# Patient Record
Sex: MALE | Race: WHITE | NOT HISPANIC OR LATINO | Employment: OTHER | URBAN - METROPOLITAN AREA
[De-identification: names, ages, dates, MRNs, and addresses within clinical notes are randomized per-mention and may not be internally consistent; named-entity substitution may affect disease eponyms.]

---

## 2020-12-15 ENCOUNTER — TRANSFERRED RECORDS (OUTPATIENT)
Dept: HEALTH INFORMATION MANAGEMENT | Facility: CLINIC | Age: 70
End: 2020-12-15

## 2021-01-08 ENCOUNTER — TRANSFERRED RECORDS (OUTPATIENT)
Dept: HEALTH INFORMATION MANAGEMENT | Facility: CLINIC | Age: 71
End: 2021-01-08

## 2021-05-26 ENCOUNTER — RECORDS - HEALTHEAST (OUTPATIENT)
Dept: ADMINISTRATIVE | Facility: CLINIC | Age: 71
End: 2021-05-26

## 2021-06-23 ENCOUNTER — TRANSFERRED RECORDS (OUTPATIENT)
Dept: HEALTH INFORMATION MANAGEMENT | Facility: CLINIC | Age: 71
End: 2021-06-23

## 2021-06-25 ENCOUNTER — TRANSCRIBE ORDERS (OUTPATIENT)
Dept: OTHER | Age: 71
End: 2021-06-25

## 2021-06-25 DIAGNOSIS — R21 RASH: Primary | ICD-10-CM

## 2021-07-19 ENCOUNTER — VIRTUAL VISIT (OUTPATIENT)
Dept: DERMATOLOGY | Facility: CLINIC | Age: 71
End: 2021-07-19
Attending: DERMATOLOGY
Payer: MEDICARE

## 2021-07-19 DIAGNOSIS — D69.2 PURPURA (H): ICD-10-CM

## 2021-07-19 DIAGNOSIS — L20.84 INTRINSIC ATOPIC DERMATITIS: Primary | ICD-10-CM

## 2021-07-19 PROCEDURE — 99443 PR PHYSICIAN TELEPHONE EVALUATION 21-30 MIN: CPT | Performed by: DERMATOLOGY

## 2021-07-19 RX ORDER — DESONIDE 0.5 MG/G
OINTMENT TOPICAL
COMMUNITY
Start: 2021-01-25 | End: 2022-05-22

## 2021-07-19 RX ORDER — LISINOPRIL 20 MG/1
30 TABLET ORAL
COMMUNITY
Start: 2021-05-25 | End: 2024-01-01

## 2021-07-19 RX ORDER — BETAMETHASONE DIPROPIONATE 0.5 MG/ML
LOTION, AUGMENTED TOPICAL
COMMUNITY
Start: 2020-11-02 | End: 2022-05-22

## 2021-07-19 NOTE — LETTER
7/19/2021       RE: Junaid Cormier  [address] [place] [address]     Dear Colleague,    Thank you for referring your patient, Junaid Cormier, to the [facility] DERMATOLOGY CLINIC [place] at [facility]. Please see a copy of my visit note below.    [facility] Dermatology Note  Encounter Date: Jul 19, 2021   Store-and-Forward and Telephone ([phone] ). Location of teledermatologist: [facility] DERMATOLOGY [facility]INIC [place].  Start time: 12:42. End time: 1:21.    Dermatology Problem List:  1. Eczematous dermatitis: may have component of ACD; MF has been a diagnostic consideration in past but biopsies/clinical not supportive  - outside biopsy x2: subacute spongiotic dermatitis  - current therapy: starting dupilumab; on mycophenolate 1000 mg BID, cetirizine 10/20 mg, esoximetasone/desonide/fluocinonide, nbUVB phototherapy  - in reserve: methotrexate  2. Actinic purpura: due in part to topical steroid use   ____________________________________________    Assessment & Plan:     1. Eczematous dermatitis: will need to obtain outside records for further assessment, however patient's wife read out biopsy reports to be over the phone which clearly support a diagnosis of eczematous dermatitis and do not support a diagnosis of mycosis fungoides. Photos are also consistent with the former but not the latter diagnosis. Given refractoriness and persistent pruritus, patient is a good candidate for dupilumab and we discussed risks/benefits. Query component of allergic contact dermatitis though no clear inciting factor; once off mycophenolate would consider referral for patch testing. Has tried/failed numerous topicals (and now with purpura due in part to steroid-induced atrophy), phototherapy, oral antihistamines, mycophenolate.  - start dupilumab  - once started dupilumab, will taper mycophenolate first, then nbUVB phototherapy  and topicals; for now will continue the current regimen: mycophenolate mofetil 1000 mg BID, cetirizine 10 mg qam/20 mg at bedtime, desoximetasone/desonide/fluocinonide, nbUVB phototherapy  - consider patch testing in future once off mycophenolate      Procedures Performed:    None    Over 48 minutes was spent during this visit on the date of service, including >39 minutes of direct contact time with the patient counseling and coordinating care including the discussion and documentation of diagnosis, natural history, treatment, and prognosis. This excludes time spent performing any relevant procedures.    Follow-up: 2-3 months    Staff:     Gilberto Virgen MD, FAAD   of Dermatology  Department of Dermatology  [facility]    ____________________________________________    CC: Rash (Junaid, is here for rash or eczema, no other concerns )    HPI:  Mr. Junaid Cormier is a(n) 71 year old male who presents today as a new patient for rash    Rash - present for more than 1 year  - itchy, widespread  - no redness anymore  - scratches very hard and leads to bruising  - now mostly present on arms, hands, scalp - cracking on hands, feet - made ambulation difficult  - was initially red and raised  - had lots of ointments, oral medications (cetirizine 10 mg qam, 20 mg at bedtime), phototherapy  - on mycophenolate mofetil 1000 mg BID x4-5 months  - currently on desoximetasone, desonide, fluocinonide solution and Vanicream lotion  - had two biopsies which showed eczema:   - chest & shoulder - 12/15/2020 - subacute spongiotic dermatitis; PAS negative    Patient is otherwise feeling well, without additional skin concerns.    Labs Reviewed:  N/A    Physical Exam:  Vitals: There were no vitals taken for this visit.  SKIN: Teledermatology photos were reviewed; image quality and interpretability: acceptable. Image date: 7/16/21.  - purpuric patches on the neck, forearms  -  erythema and flaking on the scalp  - fissuring on the fingertips  - No other lesions of concern on areas examined.     Medications:  Current Outpatient Medications   Medication     augmented betamethasone dipropionate (DIPROLENE) 0.05 % external lotion     cetirizine HCl 10 MG CAPS     desonide (DESOWEN) 0.05 % external ointment     Dupilumab 300 MG/2ML SOPN     Dupilumab 300 MG/2ML SOPN     lisinopril (ZESTRIL) 20 MG tablet     No current facility-administered medications for this visit.      Past Medical/Surgical History:   Patient Active Problem List   Diagnosis     Intrinsic atopic dermatitis     History reviewed. No pertinent past medical history.    CC Dominic Luke MD  [facility]  [address] [facility] [address]  [place] [address] on close of this encounter.

## 2021-07-19 NOTE — NURSING NOTE
Chief Complaint   Patient presents with     Rash     Junaid, is here for rash or eczema, no other concerns     David Barney EMT

## 2021-07-19 NOTE — PROGRESS NOTES
[facility] Dermatology Note  Encounter Date: Jul 19, 2021   Store-and-Forward and Telephone ([phone] ). Location of teledermatologist: [facility] DERMATOLOGY CLINIC [place].  Start time: 12:42. End time: 1:21.    Dermatology Problem List:  1. Eczematous dermatitis: may have component of ACD; MF has been a diagnostic consideration in past but biopsies/clinical not supportive  - outside biopsy x2: subacute spongiotic dermatitis  - current therapy: starting dupilumab; on mycophenolate 1000 mg BID, cetirizine 10/20 mg, esoximetasone/desonide/fluocinonide, nbUVB phototherapy  - in reserve: methotrexate  2. Actinic purpura: due in part to topical steroid use   ____________________________________________    Assessment & Plan:     1. Eczematous dermatitis: will need to obtain outside records for further assessment, however patient's wife read out biopsy reports to be over the phone which clearly support a diagnosis of eczematous dermatitis and do not support a diagnosis of mycosis fungoides. Photos are also consistent with the former but not the latter diagnosis. Given refractoriness and persistent pruritus, patient is a good candidate for dupilumab and we discussed risks/benefits. Query component of allergic contact dermatitis though no clear inciting factor; once off mycophenolate would consider referral for patch testing. Has tried/failed numerous topicals (and now with purpura due in part to steroid-induced atrophy), phototherapy, oral antihistamines, mycophenolate.  - start dupilumab  - once started dupilumab, will taper mycophenolate first, then nbUVB phototherapy and topicals; for now will continue the current regimen: mycophenolate mofetil 1000 mg BID, cetirizine 10 mg qam/20 mg at bedtime, desoximetasone/desonide/fluocinonide, nbUVB phototherapy  - consider patch testing in future once off mycophenolate      Procedures Performed:    None    Over 48 minutes was spent during this  visit on the date of service, including >39 minutes of direct contact time with the patient counseling and coordinating care including the discussion and documentation of diagnosis, natural history, treatment, and prognosis. This excludes time spent performing any relevant procedures.    Follow-up: 2-3 months    Staff:     Gilberto Virgen MD, FAAD   of Dermatology  Department of Dermatology  [facility]    ____________________________________________    CC: Rash (Junaid, is here for rash or eczema, no other concerns )    HPI:  Mr. Junaid Cormier is a(n) 71 year old male who presents today as a new patient for rash    Rash - present for more than 1 year  - itchy, widespread  - no redness anymore  - scratches very hard and leads to bruising  - now mostly present on arms, hands, scalp - cracking on hands, feet - made ambulation difficult  - was initially red and raised  - had lots of ointments, oral medications (cetirizine 10 mg qam, 20 mg at bedtime), phototherapy  - on mycophenolate mofetil 1000 mg BID x4-5 months  - currently on desoximetasone, desonide, fluocinonide solution and Vanicream lotion  - had two biopsies which showed eczema:   - chest & shoulder - 12/15/2020 - subacute spongiotic dermatitis; PAS negative    Patient is otherwise feeling well, without additional skin concerns.    Labs Reviewed:  N/A    Physical Exam:  Vitals: There were no vitals taken for this visit.  SKIN: Teledermatology photos were reviewed; image quality and interpretability: acceptable. Image date: 7/16/21.  - purpuric patches on the neck, forearms  - erythema and flaking on the scalp  - fissuring on the fingertips  - No other lesions of concern on areas examined.     Medications:  Current Outpatient Medications   Medication     augmented betamethasone dipropionate (DIPROLENE) 0.05 % external lotion     cetirizine HCl 10 MG CAPS     desonide (DESOWEN) 0.05 % external ointment      Dupilumab 300 MG/2ML SOPN     Dupilumab 300 MG/2ML SOPN     lisinopril (ZESTRIL) 20 MG tablet     No current facility-administered medications for this visit.      Past Medical/Surgical History:   Patient Active Problem List   Diagnosis     Intrinsic atopic dermatitis     History reviewed. No pertinent past medical history.    CC Dominic Luke MD  [facility]  [address]  [place] [address] on close of this encounter.

## 2021-07-21 ENCOUNTER — TELEPHONE (OUTPATIENT)
Dept: DERMATOLOGY | Facility: CLINIC | Age: 71
End: 2021-07-21

## 2021-07-21 DIAGNOSIS — L20.84 INTRINSIC ATOPIC DERMATITIS: Primary | ICD-10-CM

## 2021-07-21 NOTE — TELEPHONE ENCOUNTER
PA Initiation    Medication: Dupixent  Insurance Company: [employer] Part D - Phone [phone] Fax [phone]  ID#: [id number]  Pharmacy Filling the Rx: [facility] - [place], [place] - [facility] [address]  Filling Pharmacy Phone:    Filling Pharmacy Fax:    Start Date: 7/21/2021    [facility] [id number]

## 2021-07-21 NOTE — LETTER
[facility]/ Clinical Review Dept.  Patient: Junaid Cormier  ID#:  [id number]  From the office of Gilberto Virgen MD      To whom it may concern,    I am writing this letter of medical necessity in regards to the recent denial of dupilumab (Dupixent) 300mg/ 2mL pens. Junaid Cormier is a pleasant male who unfortunately suffers from a very difficult, complex, long-term history of intrinsic atopic dermatitis (AD). Given refractoriness and persistent pruritus, patient is a good candidate for dupilumab and we discussed risks/benefits.Has tried/failed numerous topicals (and now with purpura due in part to steroid-induced atrophy), phototherapy, oral antihistamines, mycophenolate.    Atopic dermatitis, a form of eczema, is a chronic inflammatory disease with symptoms often appearing as a rash on the skin. Moderate-to-severe atopic dermatitis is characterized by rashes often covering much of the body, and can include intense, persistent itching and skin dryness, cracking, redness, crusting, and oozing. Itch is one of the most burdensome symptoms for patients and can be debilitating. In addition, people with moderate-to-severe atopic dermatitis experience impaired quality of life, including disrupted sleep, and increased anxiety and depression symptoms along with their disease.    In your denial letter, you state patient must first try and fail one of the covered drug:   (a) Clobetasol solution or clobetasole E  (b) Fluticasone ointment  (c) Halobetasol cream  (d) Halog ointment  (e)  Mometasone ointment  (f) Pimecrolimus cream   (g) Tacrolimus ointment.    Mr. Cormier has tried and failed mometasone (July 23, 2021-current), making him eligible to start dupilumab.  Please allow Mr. Cormier the opportunity to begin dupilumab treatment to control his atopic dermatitis.        Per package insert,  DUPIXENT is an interleukin-4 receptor alpha antagonist indicated for the treatment of patients with  moderate-to-severe atopic dermatitis whose disease is not adequately controlled with topical prescription therapies.  The patient therefore meets criteria for coverage.    * Please see attached relevant information    We feel that dupilumab (Dupixent) 300mg/ 2mL pens are the best choice of therapy for Mr. Cormier. We would like to pursue this course of therapy and are requesting that you approve our decision to provide Dupixent to our patient, allowing us to provide the best treatment option available. We thank you for your immediate attention to this issue and we would appreciate haste in your determination.                                                                                         On behalf of Gilberto Virgen MD    Sincerely,    Pauline oLgan, [facility]  Specialty Pharmacy Clinic Liaison Lead  [facility] [email or web address]    Phone: [phone]  Fax: [phone]

## 2021-07-23 RX ORDER — MOMETASONE FUROATE 1 MG/G
OINTMENT TOPICAL 2 TIMES DAILY
Qty: 45 G | Refills: 1 | Status: SHIPPED | OUTPATIENT
Start: 2021-07-23 | End: 2022-09-23 | Stop reason: ALTCHOICE

## 2021-07-23 NOTE — TELEPHONE ENCOUNTER
PRIOR AUTHORIZATION DENIED    Medication: Dupixent    Denial Date: 7/21/2021    Denial Rational:     Appeal Information:

## 2021-07-23 NOTE — TELEPHONE ENCOUNTER
Called to discuss denial of dupilumab. Patient has not trialed any of insurance-mandated creams/ointments, though has tried multiple of equivalent potencies without adequate control of symptoms. Nevertheless, will send mometasone ointment per insurance requirement though no expectation that this will lead to any clinical improvement over his current regimen.

## 2021-08-08 ENCOUNTER — HEALTH MAINTENANCE LETTER (OUTPATIENT)
Age: 71
End: 2021-08-08

## 2021-08-18 NOTE — TELEPHONE ENCOUNTER
Done. APRIL we will be reordering it again in the near future, once he's tried (and failed) the insurance-mandated (randomly selected) list of topical steroids they provided us with. Thanks!

## 2021-08-20 DIAGNOSIS — L20.84 INTRINSIC ATOPIC DERMATITIS: Primary | ICD-10-CM

## 2021-08-23 NOTE — TELEPHONE ENCOUNTER
Medication Appeal Initiation    We have initiated an appeal for the requested medication:  Medication: Dupixent- Appeal  Appeal Start Date:  8/23/2021  Insurance Company: [employer] ([facility]) - Phone [phone] Fax [phone]  Comments:  Faxed to [phone]

## 2021-08-26 NOTE — TELEPHONE ENCOUNTER
Called [employer] to check the status of the appeal.  Per the rep the appeal was approved 07/21/2021 to 12/31/2021.  The approval is being faxed to me.    MEDICATION APPEAL APPROVED    Medication: Dupixent  Authorization Effective Date: 7/21/2021  Authorization Expiration Date: 12/31/2021  Approved Dose/Quantity:   Reference #: CMM Key [id number]   Insurance Company: Larisa ([facility]) - Phone [phone] Fax [phone]  Expected CoPay:       CoPay Card Available:      Foundation Assistance Needed:    Which Pharmacy is filling the prescription (Not needed for infusion/clinic administered): [facility] - [place], [place] - [address]

## 2021-09-14 NOTE — TELEPHONE ENCOUNTER
Called Dupixent [employer] to check the status of the application.  Per the rep the application is still in review.  They need the proof of income still.    Vivi

## 2021-09-15 ENCOUNTER — VIRTUAL VISIT (OUTPATIENT)
Dept: DERMATOLOGY | Facility: CLINIC | Age: 71
End: 2021-09-15
Payer: MEDICARE

## 2021-09-15 ENCOUNTER — MYC MEDICAL ADVICE (OUTPATIENT)
Dept: DERMATOLOGY | Facility: CLINIC | Age: 71
End: 2021-09-15

## 2021-09-15 DIAGNOSIS — L20.84 INTRINSIC ATOPIC DERMATITIS: Primary | ICD-10-CM

## 2021-09-15 PROCEDURE — 99442 PR PHYSICIAN TELEPHONE EVALUATION 11-20 MIN: CPT | Mod: 95 | Performed by: DERMATOLOGY

## 2021-09-15 ASSESSMENT — PAIN SCALES - GENERAL: PAINLEVEL: NO PAIN (0)

## 2021-09-15 NOTE — LETTER
9/15/2021       RE: Junaid Cormier  [address] [place] [address]     Dear Colleague,    Thank you for referring your patient, Junaid Cormier, to the [facility] DERMATOLOGY CLINIC [place] at [facility]. Please see a copy of my visit note below.    [facility] Dermatology Note  Encounter Date: Sep 15, 2021  Store-and-Forward and Telephone ([phone] ). Location of teledermatologist: [facility] DERMATOLOGY CLINIC [place].  Start time: 10:24. End time: 10:37.    Dermatology Problem List:  1. Eczematous dermatitis: may have component of ACD; MF has been a diagnostic consideration in past but biopsies/clinical not supportive  - outside biopsy x2: subacute spongiotic dermatitis  - current therapy: starting dupilumab; on mycophenolate 1000 mg BID, cetirizine 10/20 mg, esoximetasone/desonide/fluocinonide, nbUVB phototherapy  - in reserve: methotrexate  2. Actinic purpura: due in part to topical steroid use     ____________________________________________    Assessment & Plan:     1. Eczematous dermatitis: awaiting dupilumab prescription, which will hopefully be delivered later this week/early next week. We discussed plan for mycophenolate taper once start dupilumab. We also discussed merits of getting a COVID-19 booster shot, which I do recommend, though would wait until we are off mycophenolate to try to maximize immunologic response, if possible.  - start dupilumab  - taper mycophenolate  - get COVID-19 booster shot  - continue topicals as needed    Procedures Performed:    None    Follow-up: 3 months    Staff:     Gilberto Virgen MD, FAAD   of Dermatology  Department of Dermatology  [facility]    ____________________________________________    CC: Eczema (Sean, is here for a Eczema, and he is itchy, no other concerns )    HPI:  Mr. Junaid Cormier is a(n) 71  year old male who presents today as a return patient for eczema    Eczematous dermatitis - waiting on income validation from [employer]  - faxed in form yesterday morning  - eczema persistent - still having fissures on the hands, legs with itchy patches  - hands getting a bit better, now more ankles and knees  - on mycophenolate - query COVID-19 booster?    Patient is otherwise feeling well, without additional skin concerns.    Labs Reviewed:  N/A    Physical Exam:  Vitals: There were no vitals taken for this visit.  SKIN: Teledermatology photos were reviewed; image quality and interpretability: acceptable. Image date: 9/15/21.  - erythema and fissuring on the hands, knee, ankle  - No other lesions of concern on areas examined.     Medications:  Current Outpatient Medications   Medication     augmented betamethasone dipropionate (DIPROLENE) 0.05 % external lotion     cetirizine HCl 10 MG CAPS     desonide (DESOWEN) 0.05 % external ointment     Dupilumab 300 MG/2ML SOPN     Dupilumab 300 MG/2ML SOPN     lisinopril (ZESTRIL) 20 MG tablet     mometasone (ELOCON) 0.1 % external ointment     No current facility-administered medications for this visit.      Past Medical/Surgical History:   Patient Active Problem List   Diagnosis     Intrinsic atopic dermatitis     No past medical history on file.    CC No referring provider defined for this encounter. on close of this encounter.

## 2021-09-15 NOTE — PROGRESS NOTES
[facility] Dermatology Note  Encounter Date: Sep 15, 2021  Store-and-Forward and Telephone ([phone] ). Location of teledermatologist: [facility] DERMATOLOGY CLINIC [place].  Start time: 10:24. End time: 10:37.    Dermatology Problem List:  1. Eczematous dermatitis: may have component of ACD; MF has been a diagnostic consideration in past but biopsies/clinical not supportive  - outside biopsy x2: subacute spongiotic dermatitis  - current therapy: starting dupilumab; on mycophenolate 1000 mg BID, cetirizine 10/20 mg, esoximetasone/desonide/fluocinonide, nbUVB phototherapy  - in reserve: methotrexate  2. Actinic purpura: due in part to topical steroid use     ____________________________________________    Assessment & Plan:     1. Eczematous dermatitis: awaiting dupilumab prescription, which will hopefully be delivered later this week/early next week. We discussed plan for mycophenolate taper once start dupilumab. We also discussed merits of getting a COVID-19 booster shot, which I do recommend, though would wait until we are off mycophenolate to try to maximize immunologic response, if possible.  - start dupilumab  - taper mycophenolate  - get COVID-19 booster shot  - continue topicals as needed    Procedures Performed:    None    Follow-up: 3 months    Staff:     Gilberto Virgen MD, FAAD   of Dermatology  Department of Dermatology  [facility]    ____________________________________________    CC: Eczema (Sean, is here for a Eczema, and he is itchy, no other concerns )    HPI:  Mr. Junaid Cormier is a(n) 71 year old male who presents today as a return patient for eczema    Eczematous dermatitis - waiting on income validation from [employer]  - faxed in form yesterday morning  - eczema persistent - still having fissures on the hands, legs with itchy patches  - hands getting a bit better, now more ankles and knees  - on  mycophenolate - query COVID-19 booster?    Patient is otherwise feeling well, without additional skin concerns.    Labs Reviewed:  N/A    Physical Exam:  Vitals: There were no vitals taken for this visit.  SKIN: Teledermatology photos were reviewed; image quality and interpretability: acceptable. Image date: 9/15/21.  - erythema and fissuring on the hands, knee, ankle  - No other lesions of concern on areas examined.     Medications:  Current Outpatient Medications   Medication     augmented betamethasone dipropionate (DIPROLENE) 0.05 % external lotion     cetirizine HCl 10 MG CAPS     desonide (DESOWEN) 0.05 % external ointment     Dupilumab 300 MG/2ML SOPN     Dupilumab 300 MG/2ML SOPN     lisinopril (ZESTRIL) 20 MG tablet     mometasone (ELOCON) 0.1 % external ointment     No current facility-administered medications for this visit.      Past Medical/Surgical History:   Patient Active Problem List   Diagnosis     Intrinsic atopic dermatitis     No past medical history on file.    CC No referring provider defined for this encounter. on close of this encounter.

## 2021-09-15 NOTE — NURSING NOTE
Chief Complaint   Patient presents with     Eczema     Sean, is here for a Eczema, and he is itchy, no other concerns     David Barney EMT

## 2021-09-21 NOTE — TELEPHONE ENCOUNTER
Free Drug Application Approved  Effective Dates until 12/31/2021  Patient notified?yes  Additional Information

## 2021-10-03 ENCOUNTER — HEALTH MAINTENANCE LETTER (OUTPATIENT)
Age: 71
End: 2021-10-03

## 2021-12-06 ENCOUNTER — VIRTUAL VISIT (OUTPATIENT)
Dept: DERMATOLOGY | Facility: CLINIC | Age: 71
End: 2021-12-06
Payer: MEDICARE

## 2021-12-06 DIAGNOSIS — L20.84 INTRINSIC ATOPIC DERMATITIS: Primary | ICD-10-CM

## 2021-12-06 PROCEDURE — 99213 OFFICE O/P EST LOW 20 MIN: CPT | Mod: 95 | Performed by: DERMATOLOGY

## 2021-12-06 ASSESSMENT — PAIN SCALES - GENERAL: PAINLEVEL: NO PAIN (0)

## 2021-12-06 NOTE — LETTER
12/6/2021       RE: Junaid Cormier  [address] [place] [address]     Dear Colleague,    Thank you for referring your patient, Junaid Cormier, to the [facility] DERMATOLOGY CLINIC [place] at [facility]. Please see a copy of my visit note below.    [facility] Dermatology Note  Encounter Date: Dec 6, 2021  Store-and-Forward and Telephone ([phone] ). Location of teledermatologist: [facility] DERMATOLOGY [facility].  Start time: 12:02. End time: 12:22.    Dermatology Problem List:  1. Eczematous dermatitis: may have component of ACD; MF has been a diagnostic consideration in past but biopsies/clinical not supportive  - outside biopsy x2: subacute spongiotic dermatitis  - current therapy: dupilumab   - previous therapy: mycophenolate 1000 mg BID, cetirizine 10/20 mg, esoximetasone/desonide/fluocinonide, nbUVB phototherapy  - in reserve: methotrexate  2. Actinic purpura: due in part to topical steroid use     ____________________________________________    Assessment & Plan:   1. Eczematous dermatitis:   Great response to dupilumab. He felt that it started working within a week. No more itching or bleeding and skin discoloration is improving. Tolerating medication well.   - continue dupilumab  - get COVID-19 booster shot  - continue topicals as needed    Procedures Performed:    None    Follow-up: 6 month(s) virtually (telephone with photos), or earlier for new or changing lesions    Staff and Resident:     Patient discussed with Dr. Virgen.    Josseline Naik MD  [facility]  Medicine-Pediatrics, PGY-3    Staff Physician Comments:   I evaluated any available patient photographs with the resident and I edited the assessment and plan as documented in the note. I was present on the line and participated in the entire telephone call.    Gilberto Virgen MD   of  Dermatology  Department of Dermatology  [facility]    ____________________________________________    CC: Eczema (Sean, is here for an eczema appt, no other concerns )    HPI:  Mr. Junaid Cormier is a(n) 71 year old male who presents today for follow-up  for eczema.    Since the last visit, he was able to start on the dupilumab. Within a week of starting treatment, he noticed improvement in his skin. He no longer has any itching or bleeding and the skin discoloration is improving. He is tolerating the injections well with no itchy eyes. He does get some mild injection site burning. He has successfully tapered off of the mycophenolate. He is not using any of his topicals currently.     Patient is otherwise feeling well, without additional skin concerns.    Labs Reviewed:  N/A    Physical Exam:  Vitals: There were no vitals taken for this visit.  SKIN: Teledermatology photos were reviewed; image quality and interpretability: acceptable. Image date: 11/7.  - Images of ankles, forearms, knees, and hands reviewed.   - Mild dryness of the skin on the ankles, but no obvious flaking or erythema  - No other lesions of concern on areas examined.     Medications:  Current Outpatient Medications   Medication     augmented betamethasone dipropionate (DIPROLENE) 0.05 % external lotion     cetirizine HCl 10 MG CAPS     desonide (DESOWEN) 0.05 % external ointment     Dupilumab 300 MG/2ML SOPN     Dupilumab 300 MG/2ML SOPN     lisinopril (ZESTRIL) 20 MG tablet     mometasone (ELOCON) 0.1 % external ointment     No current facility-administered medications for this visit.      Past Medical/Surgical History:   Patient Active Problem List   Diagnosis     Intrinsic atopic dermatitis     History reviewed. No pertinent past medical history.    CC Referred MD Brady  No address on file on close of this encounter.

## 2021-12-06 NOTE — PROGRESS NOTES
[facility] Dermatology Note  Encounter Date: Dec 6, 2021  Store-and-Forward and Telephone ([phone] ). Location of teledermatologist: [facility] DERMATOLOGY CLINIC [place].  Start time: 12:02. End time: 12:22.    Dermatology Problem List:  1. Eczematous dermatitis: may have component of ACD; MF has been a diagnostic consideration in past but biopsies/clinical not supportive  - outside biopsy x2: subacute spongiotic dermatitis  - current therapy: dupilumab   - previous therapy: mycophenolate 1000 mg BID, cetirizine 10/20 mg, esoximetasone/desonide/fluocinonide, nbUVB phototherapy  - in reserve: methotrexate  2. Actinic purpura: due in part to topical steroid use     ____________________________________________    Assessment & Plan:   1. Eczematous dermatitis:   Great response to dupilumab. He felt that it started working within a week. No more itching or bleeding and skin discoloration is improving. Tolerating medication well.   - continue dupilumab  - get COVID-19 booster shot  - continue topicals as needed    Procedures Performed:    None    Follow-up: 6 month(s) virtually (telephone with photos), or earlier for new or changing lesions    Staff and Resident:     Patient discussed with Dr. Virgen.    Josseline Naik MD  [facility]  Medicine-Pediatrics, PGY-3    Staff Physician Comments:   I evaluated any available patient photographs with the resident and I edited the assessment and plan as documented in the note. I was present on the line and participated in the entire telephone call.    Gilberto Virgen MD   of Dermatology  Department of Dermatology  [facility]    ____________________________________________    CC: Eczema (Sean, is here for an eczema appt, no other concerns )    HPI:  Mr. Junaid Cormier is a(n) 71 year old male who presents today for follow-up  for eczema.    Since the last visit,  he was able to start on the dupilumab. Within a week of starting treatment, he noticed improvement in his skin. He no longer has any itching or bleeding and the skin discoloration is improving. He is tolerating the injections well with no itchy eyes. He does get some mild injection site burning. He has successfully tapered off of the mycophenolate. He is not using any of his topicals currently.     Patient is otherwise feeling well, without additional skin concerns.    Labs Reviewed:  N/A    Physical Exam:  Vitals: There were no vitals taken for this visit.  SKIN: Teledermatology photos were reviewed; image quality and interpretability: acceptable. Image date: 11/7.  - Images of ankles, forearms, knees, and hands reviewed.   - Mild dryness of the skin on the ankles, but no obvious flaking or erythema  - No other lesions of concern on areas examined.     Medications:  Current Outpatient Medications   Medication     augmented betamethasone dipropionate (DIPROLENE) 0.05 % external lotion     cetirizine HCl 10 MG CAPS     desonide (DESOWEN) 0.05 % external ointment     Dupilumab 300 MG/2ML SOPN     Dupilumab 300 MG/2ML SOPN     lisinopril (ZESTRIL) 20 MG tablet     mometasone (ELOCON) 0.1 % external ointment     No current facility-administered medications for this visit.      Past Medical/Surgical History:   Patient Active Problem List   Diagnosis     Intrinsic atopic dermatitis     History reviewed. No pertinent past medical history.    CC Referred MD Brady  No address on file on close of this encounter.

## 2021-12-06 NOTE — NURSING NOTE
Chief Complaint   Patient presents with     Eczema     Sean, is here for an eczema appt, no other concerns     David Barney EMT

## 2021-12-09 ENCOUNTER — TELEPHONE (OUTPATIENT)
Dept: DERMATOLOGY | Facility: CLINIC | Age: 71
End: 2021-12-09
Payer: MEDICARE

## 2021-12-09 NOTE — TELEPHONE ENCOUNTER
[facility] Call Center    Phone Message    May a detailed message be left on voicemail: yes     Reason for Call: Medication Question or concern regarding medication   Prescription Clarification  Name of Medication: Dupixant My Way enrollment paperwork  Prescribing Provider: Dr. Virgen   Pharmacy:    What on the order needs clarification? Pt's significant other Dmitriy states that Dr. Virgen asked her to remind him to complete the Dupixant [employer] enrollment paperwork because the deadline is 12/20/21. Dmitriy states she has completed the enrollment on her end and is waiting for Dr. Virgen to complete his end.     Please call Pt or send [employer] message when completed. Thank you.        Action Taken: Message routed to:  [facility] Derm    Travel Screening: Not Applicable

## 2021-12-14 ENCOUNTER — TELEPHONE (OUTPATIENT)
Dept: DERMATOLOGY | Facility: CLINIC | Age: 71
End: 2021-12-14
Payer: MEDICARE

## 2021-12-14 DIAGNOSIS — L20.84 INTRINSIC ATOPIC DERMATITIS: ICD-10-CM

## 2021-12-14 NOTE — TELEPHONE ENCOUNTER
[facility] Call Center    Phone Message    May a detailed message be left on voicemail: yes     Reason for Call: Medication Refill Request    Has the patient contacted the pharmacy for the refill? Yes   Name of medication being requested: Dupixent  Provider who prescribed the medication: Dr. Virgen  Pharmacy: [facility] [address]  Date medication is needed: ASAP         Action Taken: Message routed to:  [facility] Derm    Travel Screening: Not Applicable

## 2022-01-26 NOTE — TELEPHONE ENCOUNTER
I sent the patient a message to see if he has received a message from Dupmichelle Persaud.    Vivi

## 2022-02-25 DIAGNOSIS — L20.84 INTRINSIC ATOPIC DERMATITIS: ICD-10-CM

## 2022-03-02 NOTE — TELEPHONE ENCOUNTER
Dupilumab 300 MG/2ML SOPN      Last Written Prescription Date:  12-14-21  Last Fill Quantity: 4 ml,   # refills: 11  Last Office Visit : 12-6-21  Future Office visit:  none    Routing refill request to provider for review/approval because:  Pharm requesting 90 day-     Current rx 30 day supply  Med not on protocol

## 2022-03-02 NOTE — TELEPHONE ENCOUNTER
Received refill request for dupilumab as the resident on call.   Reviewed patient's chart and attached communication.   Patient last seen 12/6/21 for eczematous dermatitis. RTC not yet scheduled.     After reviewing the medication list and assessment and plan from last visit, the refill request was approved. Pharmacy requesting 90 day supply - will provide with 1 refill to bridge to follow up.     Patient due for follow up with Dr. Virgen in 6/2022. Please schedule appointment.     CC'ing Dr. Virgen as FYJOSE LUIS.    Jass Houser MD  PGY-2 Dermatology

## 2022-03-21 ENCOUNTER — VIRTUAL VISIT (OUTPATIENT)
Dept: DERMATOLOGY | Facility: CLINIC | Age: 72
End: 2022-03-21
Payer: MEDICARE

## 2022-03-21 DIAGNOSIS — L20.84 INTRINSIC ATOPIC DERMATITIS: Primary | ICD-10-CM

## 2022-03-21 PROCEDURE — 99213 OFFICE O/P EST LOW 20 MIN: CPT | Mod: TEL | Performed by: DERMATOLOGY

## 2022-03-21 NOTE — LETTER
3/21/2022       RE: Junaid Cormier  [address] [place] [address]     Dear Colleague,    Thank you for referring your patient, Junaid Cormier, to the [facility] DERMATOLOGY CLINIC [place] at [facility]. Please see a copy of my visit note below.    [facility] Dermatology Note  Encounter Date: Mar 21, 2022  Telephone ([phone]). Location of teledermatologist: [facility] DERMATOLOGY CLINIC [place].  Start time: 2:15. End time: 2:25.    Dermatology Problem List:  1. Eczematous dermatitis: may have component of ACD; MF has been a diagnostic consideration in past but biopsies/clinical not supportive  - outside biopsy x2: subacute spongiotic dermatitis  - current therapy: dupilumab   - previous therapy: mycophenolate 1000 mg BID, cetirizine 10/20 mg, desoximetasone/desonide/fluocinonide, nbUVB phototherapy  - in reserve: methotrexate  2. Actinic purpura: due in part to topical steroid use     ____________________________________________    Assessment & Plan:     1. Atopic dermatitis: overall well-controlled with slight increase in itching on the palms and forehead, in the context of recent aortic stenting which may have led to increased activity. Will add topicals as needed to breakthrough areas. Okay to resume dupilumab in context of recent procedure. We did discuss that dupilumab is not curative, and cessation may lead to recurrence of symptoms.  - dupilumab 300 mg q14 days  - can restart desonide PRN for breakthrough itching    Procedures Performed:    None    Follow-up: 6 months    Staff:     Gilberto Virgen MD, FAAD   of Dermatology  Department of Dermatology  [facility]    ____________________________________________    CC: Follow Up (Dupixent f/u, no changes in skin or symptoms)    HPI:  Mr. Junaid Cormier is a(n) 71 year old male who presents today  as a return patient for eczematous dermatitis    Eczematous dermatitis - a few days late on last dupilumab shot due to aortic aneurysm stenting  - found during vocal cord paralysis workup  - skin doing well - sometimes itching and hands and forehead - not as bad as prior - here and there  - has some desonide remaining    Patient is otherwise feeling well, without additional skin concerns.    Labs Reviewed:  N/A    Physical Exam:  Vitals: There were no vitals taken for this visit.  SKIN: no photos  - No other lesions of concern on areas examined.     Medications:  Current Outpatient Medications   Medication     Dupilumab 300 MG/2ML SOPN     Dupilumab 300 MG/2ML SOPN     Dupilumab 300 MG/2ML SOPN     augmented betamethasone dipropionate (DIPROLENE) 0.05 % external lotion     cetirizine HCl 10 MG CAPS     desonide (DESOWEN) 0.05 % external ointment     lisinopril (ZESTRIL) 20 MG tablet     mometasone (ELOCON) 0.1 % external ointment     No current facility-administered medications for this visit.      Past Medical/Surgical History:   Patient Active Problem List   Diagnosis     Intrinsic atopic dermatitis     No past medical history on file.    CC Referred Self, MD  No address on file on close of this encounter.

## 2022-03-21 NOTE — CONFIDENTIAL NOTE
Chief Complaint   Patient presents with     Follow Up     Dupixent f/u, no changes in skin or symptoms     Medications and allergies reviewed with patient and patient's spouse, Dmitriy.    Laurie Artis, VVF

## 2022-03-21 NOTE — PROGRESS NOTES
[facility] Dermatology Note  Encounter Date: Mar 21, 2022  Telephone ([phone]). Location of teledermatologist: [facility] DERMATOLOGY CLINIC [place].  Start time: 2:15. End time: 2:25.    Dermatology Problem List:  1. Eczematous dermatitis: may have component of ACD; MF has been a diagnostic consideration in past but biopsies/clinical not supportive  - outside biopsy x2: subacute spongiotic dermatitis  - current therapy: dupilumab   - previous therapy: mycophenolate 1000 mg BID, cetirizine 10/20 mg, desoximetasone/desonide/fluocinonide, nbUVB phototherapy  - in reserve: methotrexate  2. Actinic purpura: due in part to topical steroid use     ____________________________________________    Assessment & Plan:     1. Atopic dermatitis: overall well-controlled with slight increase in itching on the palms and forehead, in the context of recent aortic stenting which may have led to increased activity. Will add topicals as needed to breakthrough areas. Okay to resume dupilumab in context of recent procedure. We did discuss that dupilumab is not curative, and cessation may lead to recurrence of symptoms.  - dupilumab 300 mg q14 days  - can restart desonide PRN for breakthrough itching    Procedures Performed:    None    Follow-up: 6 months    Staff:     Gilberto Virgen MD, FAAD   of Dermatology  Department of Dermatology  [facility]    ____________________________________________    CC: Follow Up (Dupixent f/u, no changes in skin or symptoms)    HPI:  Mr. Junaid Cormier is a(n) 71 year old male who presents today as a return patient for eczematous dermatitis    Eczematous dermatitis - a few days late on last dupilumab shot due to aortic aneurysm stenting  - found during vocal cord paralysis workup  - skin doing well - sometimes itching and hands and forehead - not as bad as prior - here and there  - has some desonide  remaining    Patient is otherwise feeling well, without additional skin concerns.    Labs Reviewed:  N/A    Physical Exam:  Vitals: There were no vitals taken for this visit.  SKIN: no photos  - No other lesions of concern on areas examined.     Medications:  Current Outpatient Medications   Medication     Dupilumab 300 MG/2ML SOPN     Dupilumab 300 MG/2ML SOPN     Dupilumab 300 MG/2ML SOPN     augmented betamethasone dipropionate (DIPROLENE) 0.05 % external lotion     cetirizine HCl 10 MG CAPS     desonide (DESOWEN) 0.05 % external ointment     lisinopril (ZESTRIL) 20 MG tablet     mometasone (ELOCON) 0.1 % external ointment     No current facility-administered medications for this visit.      Past Medical/Surgical History:   Patient Active Problem List   Diagnosis     Intrinsic atopic dermatitis     No past medical history on file.    CC Referred Self, MD  No address on file on close of this encounter.

## 2022-05-22 ENCOUNTER — HOSPITAL ENCOUNTER (EMERGENCY)
Facility: CLINIC | Age: 72
Discharge: HOME | End: 2022-05-22
Attending: EMERGENCY MEDICINE | Admitting: EMERGENCY MEDICINE
Payer: MEDICARE

## 2022-05-22 VITALS
BODY MASS INDEX: 24.38 KG/M2 | OXYGEN SATURATION: 99 % | HEART RATE: 73 BPM | DIASTOLIC BLOOD PRESSURE: 64 MMHG | SYSTOLIC BLOOD PRESSURE: 137 MMHG | HEIGHT: 72 IN | TEMPERATURE: 97.5 F | RESPIRATION RATE: 18 BRPM | WEIGHT: 180 LBS

## 2022-05-22 DIAGNOSIS — L30.8 OTHER ECZEMA: ICD-10-CM

## 2022-05-22 PROCEDURE — 250N000012 HC RX MED GY IP 250 OP 636 PS 637: Performed by: EMERGENCY MEDICINE

## 2022-05-22 PROCEDURE — 99284 EMERGENCY DEPT VISIT MOD MDM: CPT

## 2022-05-22 PROCEDURE — 250N000013 HC RX MED GY IP 250 OP 250 PS 637: Performed by: EMERGENCY MEDICINE

## 2022-05-22 RX ORDER — BETAMETHASONE DIPROPIONATE 0.5 MG/ML
LOTION, AUGMENTED TOPICAL
Qty: 60 ML | Refills: 0 | Status: SHIPPED | OUTPATIENT
Start: 2022-05-22 | End: 2023-06-04

## 2022-05-22 RX ORDER — HYDROXYZINE HYDROCHLORIDE 25 MG/1
25 TABLET, FILM COATED ORAL EVERY 6 HOURS PRN
Qty: 20 TABLET | Refills: 0 | Status: SHIPPED | OUTPATIENT
Start: 2022-05-22 | End: 2023-01-01

## 2022-05-22 RX ORDER — DESONIDE 0.5 MG/G
OINTMENT TOPICAL
Qty: 60 G | Refills: 0 | Status: SHIPPED | OUTPATIENT
Start: 2022-05-22 | End: 2022-09-08

## 2022-05-22 RX ORDER — HYDROXYZINE HYDROCHLORIDE 25 MG/1
25 TABLET, FILM COATED ORAL ONCE
Status: COMPLETED | OUTPATIENT
Start: 2022-05-22 | End: 2022-05-22

## 2022-05-22 RX ORDER — PREDNISONE 20 MG/1
TABLET ORAL
Qty: 10 TABLET | Refills: 0 | Status: SHIPPED | OUTPATIENT
Start: 2022-05-22 | End: 2022-09-23 | Stop reason: ALTCHOICE

## 2022-05-22 RX ORDER — PREDNISONE 20 MG/1
40 TABLET ORAL ONCE
Status: COMPLETED | OUTPATIENT
Start: 2022-05-22 | End: 2022-05-22

## 2022-05-22 RX ADMIN — HYDROXYZINE HYDROCHLORIDE 25 MG: 25 TABLET, FILM COATED ORAL at 05:20

## 2022-05-22 RX ADMIN — PREDNISONE 40 MG: 20 TABLET ORAL at 05:20

## 2022-05-22 ASSESSMENT — ENCOUNTER SYMPTOMS
HEMATURIA: 0
DYSURIA: 0
DIARRHEA: 0
NAUSEA: 0
FREQUENCY: 0

## 2022-05-22 NOTE — ED TRIAGE NOTES
Pt arrives with complaints of swollen face and hands that started yesterday around noon. Has had three doses of Benadryl, and swelling continues. Pt also having itching the last week. Noted dry/cracking/flaking hands and head. Talking in full sentences.      Triage Assessment     Row Name 05/22/22 0451       Triage Assessment (Adult)    Airway WDL WDL       Respiratory WDL    Respiratory WDL WDL       Skin Circulation/Temperature WDL    Skin Circulation/Temperature WDL WDL       Cardiac WDL    Cardiac WDL WDL       Peripheral/Neurovascular WDL    Peripheral Neurovascular WDL WDL       Cognitive/Neuro/Behavioral WDL    Cognitive/Neuro/Behavioral WDL WDL

## 2022-05-22 NOTE — ED PROVIDER NOTES
EMERGENCY DEPARTMENT ENCOUNTER      NAME: Junaid Cormier  AGE: 71 year old male  YOB: 1950  MRN: [id number]  EVALUATION DATE & TIME: 5/22/2022  4:47 AM    PCP: Art Vazquez    ED PROVIDER: French Lowry M.D.      Chief Complaint   Patient presents with     Facial Swelling     Pruritis         FINAL IMPRESSION:  1. Other eczema          ED COURSE & MEDICAL DECISION MAKING:    Pertinent Labs & Imaging studies reviewed. (See chart for details)  71 year old male presents to the Emergency Department for evaluation of rash.  Patient has erythematous discrete any rash over head face ears and hands.  Has a history of eczema I think this is likely the cause of this.  Does not seem to be infectious.  I do not think this is Oconnor-Juan or scalded skin syndrome.  No signs of toxic shock syndrome.  We will have started him on prednisone.  He is followed by dermatology we will follow-up with him.  Continue his home medications.    5:01 AM I met with the patient to gather history and to perform my initial exam. I discussed the plan for care while in the Emergency Department. PPE: Surgical mask, eye shield, and gloves.   5:13 AM I discussed the plan for discharge with the patient, and patient is agreeable. We discussed supportive cares at home and reasons for return to the ER including new or worsening symptoms - all questions and concerns addressed. Patient to be discharged by RN.    At the conclusion of the encounter I discussed the results of all of the tests and the disposition. The questions were answered. The patient or family acknowledged understanding and was agreeable with the care plan.         MEDICATIONS GIVEN IN THE EMERGENCY:  Medications   predniSONE (DELTASONE) tablet 40 mg (40 mg Oral Given 5/22/22 0520)   hydrOXYzine (ATARAX) tablet 25 mg (25 mg Oral Given 5/22/22 0520)       NEW PRESCRIPTIONS STARTED AT TODAY'S ER VISIT  Discharge Medication List as of 5/22/2022  5:17 AM      START  taking these medications    Details   hydrOXYzine (ATARAX) 25 MG tablet Take 1 tablet (25 mg) by mouth every 6 hours as needed for itching, Disp-20 tablet, R-0, Local Print      predniSONE (DELTASONE) 20 MG tablet Take two tablets (= 40mg) each day for 5 (five) days, Disp-10 tablet, R-0, Local Print                =================================================================    HPI    Patient information was obtained from: Patient     Use of : N/A       Junaid Cormier is a 71 year old male with a pertinent history of hypertension, hyperlipidemia, dermatitis, s/p TEVAR (3/10/2022)  who presents to this ED by walk in escorted by family member for evaluation of facial swelling and pruritus. Patient presents with facial and bilateral hand swelling with pruritus onset 1200 yesterday. Patient has had three doses of Benadryl, last dose taken at 0000 tonight. Denies shortness of breath or throat tightness. Denies new exposures, foods, or changes in medications. He notes recently having had cardiac surgery, bypass and aneurysm repair, on 5/03/2022. He has history of eczema for which he received 300 mg of Dupixent every 2 weeks, which has helped with itching in the past. Last dose of Dupixent was yesterday. Denies diabetes. Patient denies changes in bowel movement, urinary symptoms, or any additional complaints at this time.       REVIEW OF SYSTEMS   Review of Systems   HENT:        Positive for facial swelling.    Gastrointestinal: Negative for diarrhea and nausea.   Genitourinary: Negative for dysuria, frequency, hematuria and urgency.   Musculoskeletal:        Positive for bilateral hand swelling.    Skin: Positive for rash.   All other systems reviewed and are negative.       PAST MEDICAL HISTORY:  History reviewed. No pertinent past medical history.    PAST SURGICAL HISTORY:  History reviewed. No pertinent surgical history.        CURRENT MEDICATIONS:    No current facility-administered medications  for this encounter.     Current Outpatient Medications   Medication     augmented betamethasone dipropionate (DIPROLENE) 0.05 % external lotion     desonide (DESOWEN) 0.05 % external ointment     hydrOXYzine (ATARAX) 25 MG tablet     predniSONE (DELTASONE) 20 MG tablet     cetirizine HCl 10 MG CAPS     Dupilumab 300 MG/2ML SOPN     Dupilumab 300 MG/2ML SOPN     Dupilumab 300 MG/2ML SOPN     lisinopril (ZESTRIL) 20 MG tablet     mometasone (ELOCON) 0.1 % external ointment         ALLERGIES:  No Known Allergies    FAMILY HISTORY:  History reviewed. No pertinent family history.    SOCIAL HISTORY:   Social History     Socioeconomic History     Marital status: Single   Tobacco Use     Smoking status: Former Smoker     Smokeless tobacco: Former User       VITALS:  /64   Pulse 73   Temp 97.5  F (36.4  C) (Oral)   Resp 18   Ht 1.829 m (6')   Wt 81.6 kg (180 lb)   SpO2 99%   BMI 24.41 kg/m      PHYSICAL EXAM    Physical Exam  Constitutional:       General: He is not in acute distress.     Appearance: He is not diaphoretic.   HENT:      Head: Atraumatic.   Eyes:      General: No scleral icterus.     Pupils: Pupils are equal, round, and reactive to light.   Cardiovascular:      Heart sounds: Normal heart sounds.   Pulmonary:      Effort: No respiratory distress.      Breath sounds: Normal breath sounds.   Abdominal:      General: Bowel sounds are normal.      Palpations: Abdomen is soft.      Tenderness: There is no abdominal tenderness.   Musculoskeletal:         General: No tenderness.   Skin:     Findings: No rash.      Comments: Desquamating rash over scalp bilateral hands face and ears.  This is dry.  No obvious blistering.  Does not spread with pressure.  No warmth or erythema.               I, Pablito Crockett, am serving as a scribe to document services personally performed by Dr. French Lowry, based on my observation and the provider's statements to me. I, French Lowry MD attest that Pablito Crockett is acting  in a scribe capacity, has observed my performance of the services and has documented them in accordance with my direction.    French Lowry M.D.  Emergency Medicine  [facility] EMERGENCY ROOM  [address] [address]  [phone]  Dept: [phone]     French Lowry MD  05/22/22 0617

## 2022-05-31 ENCOUNTER — TELEPHONE (OUTPATIENT)
Dept: DERMATOLOGY | Facility: CLINIC | Age: 72
End: 2022-05-31
Payer: MEDICARE

## 2022-05-31 NOTE — TELEPHONE ENCOUNTER
[facility] Call Center    Phone Message    May a detailed message be left on voicemail: yes     Reason for Call: Symptoms or Concerns     If patient has red-flag symptoms, warm transfer to triage line    Current symptom or concern: Flare up     Symptoms have been present for:  3 week(s)    Has patient previously been seen for this? No    By : NA    Date: NA    Are there any new or worsening symptoms? Yes: Pt is having a flare up and is wondering if Pt can get some Rx before the Sx get worse and would like to discuss with someone what to do? Pt is getting worse. Thanks   Please call Pt's significant other Dmitriy      Action Taken: Message routed to:  [facility] Derm    Travel Screening: Not Applicable

## 2022-06-01 NOTE — TELEPHONE ENCOUNTER
I called and left a VM asking for Sean to give us a call back to discuss. We could possibly schedule him for an appointment next Tuesday 6/7 with Dr. Gonzalez is A HFU

## 2022-06-01 NOTE — TELEPHONE ENCOUNTER
Patient called back and scheduled a telephone appointment for 7/11 at 9:40 with Dr. Virgen. Also placed on the wait list. Nothing else needed at this time.    Reba Melara CMA on 6/1/2022 at 1:47 PM

## 2022-06-05 ENCOUNTER — MYC MEDICAL ADVICE (OUTPATIENT)
Dept: DERMATOLOGY | Facility: CLINIC | Age: 72
End: 2022-06-05
Payer: MEDICARE

## 2022-06-05 DIAGNOSIS — L20.84 INTRINSIC ATOPIC DERMATITIS: Primary | ICD-10-CM

## 2022-06-08 RX ORDER — BETAMETHASONE DIPROPIONATE 0.5 MG/G
OINTMENT, AUGMENTED TOPICAL 2 TIMES DAILY
Qty: 100 G | Refills: 3 | Status: SHIPPED | OUTPATIENT
Start: 2022-06-08 | End: 2022-09-08

## 2022-06-08 NOTE — TELEPHONE ENCOUNTER
Called to address worsening symptoms.     Itching returning after surgery 5/3/22. Not severe enough to warrant systemic steroids, but ran out of topicals. Will resend. If refractory, would add 20 day prednisone taper (20-15-10-5), versus increasing frequency of dupilumab to q7 days.

## 2022-07-11 ENCOUNTER — VIRTUAL VISIT (OUTPATIENT)
Dept: DERMATOLOGY | Facility: CLINIC | Age: 72
End: 2022-07-11
Payer: MEDICARE

## 2022-07-11 DIAGNOSIS — L20.84 INTRINSIC ATOPIC DERMATITIS: Primary | ICD-10-CM

## 2022-07-11 PROCEDURE — 99214 OFFICE O/P EST MOD 30 MIN: CPT | Mod: TEL | Performed by: DERMATOLOGY

## 2022-07-11 RX ORDER — HYDROXYZINE HYDROCHLORIDE 25 MG/1
25 TABLET, FILM COATED ORAL
Qty: 30 TABLET | Refills: 3 | Status: SHIPPED | OUTPATIENT
Start: 2022-07-11 | End: 2023-01-01

## 2022-07-11 RX ORDER — TACROLIMUS 1 MG/G
OINTMENT TOPICAL
Qty: 100 G | Refills: 3 | Status: SHIPPED | OUTPATIENT
Start: 2022-07-11 | End: 2022-09-08

## 2022-07-11 RX ORDER — UREA 200 MG/G
CREAM TOPICAL
Qty: 120 G | Refills: 3 | Status: SHIPPED | OUTPATIENT
Start: 2022-07-11 | End: 2022-09-08

## 2022-07-11 RX ORDER — FEXOFENADINE HCL 180 MG/1
180 TABLET ORAL 2 TIMES DAILY
Qty: 60 TABLET | Refills: 3 | Status: SHIPPED | OUTPATIENT
Start: 2022-07-11 | End: 2022-11-09

## 2022-07-11 NOTE — PATIENT INSTRUCTIONS
For the skin:  - Stop betamethasone  - Start Protopic (tacrolimus) twice daily  - Start urea 20% cream twice daily  - Start fexofenadine (allegra) 180 mg twice daily   - Start hydroxyzine 25 mg nightly as needed  - Continue Vanicream daily, more often if tolerated. You could consider using plain petroleum jelly (Vaseline) as well  - We will refer you for patch testing with Dr. Abraham

## 2022-07-11 NOTE — LETTER
7/11/2022       RE: Junaid Cormier  [address] [place] [address]     Dear Colleague,    Thank you for referring your patient, Junaid Cormier, to the [facility] DERMATOLOGY CLINIC [place] at [facility]. Please see a copy of my visit note below.    [facility] Dermatology Note  Encounter Date: Jul 11, 2022  Telephone ([phone]). Location of teledermatologist: [facility] DERMATOLOG[facility] [place].  Start time: 9:40. End time: 10:08.    Dermatology Problem List:  1. Eczematous dermatitis: may have component of ACD; MF has been a diagnostic consideration in past but biopsies/clinical not supportive  - outside biopsy x2: subacute spongiotic dermatitis  - current therapy: dupilumab, tacrolimus oint BID, urea 20% cream BID  - previous therapy: mycophenolate 1000 mg BID, cetirizine 10/20 mg, desoximetasone/desonide/fluocinonide, nbUVB phototherapy  - in reserve: methotrexate  2. Actinic purpura: due in part to topical steroid use     ____________________________________________    Assessment & Plan:     1. Eczematous dermatitis  Previously very well controlled on dupilumab, now with flares ever since undergoing several aneurysm repair procedures/imaging. Discussed that we suspect either the physical stress from surgery and/or topical agents (eg: chlorhexidine, povidine-iodine or other topical antiseptics) used during these procedures could be contributing to his more recent flares. The patient wonders if these rashes could be from contrast agents. Will refer to Dr. Abraham for patch testing. Photos today reveal more solar purpura and xerosis with scale than clear cut eczematous dermatitis so will optimize his topical regimen to address this.  - Continue dupilumab 300 mg q14 days  - Referral to Dr. Abraham for patch testing  - Stop betamethasone ointment  - Start tacrolimus BID  - Start urea 20% cream  - Continue  Vanicream daily. Consider trying plain petroleum jelly.  - Start fexofenadine  mg daily  - Future: prednisone taper (held off given mainly just xerosis today and significant solar purpura), increase dupilumab frequency    2. Actinic purpura  Etiology reviewed. Suspect long term topical steroid and past oral steroid use playing a role.    Procedures Performed:    None    Follow-up: 3 month(s) virtually (telephone with photos) or in person, or earlier for new or changing lesions    Staff and Resident:     Willow Paniagua MD  Dermatology Resident PGY3    Staff Physician Comments:   I evaluated any available patient photographs with the resident and I edited the assessment and plan as documented in the note. I was present on the line and participated in the entire telephone call    Over 33 minutes was spent during this visit on the date of service, including direct contact time with the patient counseling and coordinating care, review of the medical records, and documentation of diagnosis, natural history, treatment, and prognosis.       Gilberto Virgen MD   of Dermatology  Department of Dermatology  [facility]      ____________________________________________    CC: RECHECK (Pt states eczema worse than before )    HPI:  Mr. Junaid Cormier is a(n) 72 year old male who presents today as a return patient for eczematous dermatitis.     He and his wife are on the line today. She notes that after starting dupilumab he got much better, but since his aneurysm repair on March 10 he has had multiple flares. He first flared after this March procedure. This procedure failed so he had a repeat procedure May 3rd for bypass/re-stenting; again, he seemed to flare after this procedure. Since these procedure he has had multiple flares. His wife wonders if this is related to contrast. On May 21, for unclear reason, his face and hands became swollen. Went to the ER and got  benadryl and prednisone; this resolved in 12 hours. No throat or tongue swelling or difficulty swallowing. Then around Father's Day nad severe itching, redness over arms, nauseea, chills. Took Benadryl. Episode lasted 12 hours. Right now he has scaling on the knees and the arms. Using betamethasone twice daily, Vanicream everywhere daily, OTC eczaderm. They have not done patch testing.    Patient is otherwise feeling well, without additional skin concerns.    Labs Reviewed:  N/A    Physical Exam:  Vitals: There were no vitals taken for this visit.  SKIN: Teledermatology photos were reviewed; image quality and interpretability: acceptable. Image date: 07/11/22.  - There is flaky xerosis of the forearms and knees  - Purpuric patches/plaques on the bilateral forearms and dorsal hands  - No other lesions of concern on areas examined.     Medications:  Current Outpatient Medications   Medication     augmented betamethasone dipropionate (DIPROLENE) 0.05 % external lotion     augmented betamethasone dipropionate (DIPROLENE-AF) 0.05 % external ointment     cetirizine HCl 10 MG CAPS     desonide (DESOWEN) 0.05 % external ointment     Dupilumab 300 MG/2ML SOPN     Dupilumab 300 MG/2ML SOPN     Dupilumab 300 MG/2ML SOPN     hydrOXYzine (ATARAX) 25 MG tablet     lisinopril (ZESTRIL) 20 MG tablet     predniSONE (DELTASONE) 20 MG tablet     mometasone (ELOCON) 0.1 % external ointment     No current facility-administered medications for this visit.      Past Medical/Surgical History:   Patient Active Problem List   Diagnosis     Intrinsic atopic dermatitis     No past medical history on file.    CC Art Vazquez MD  [facility]  [address] [place] [address] on close of this encounter.

## 2022-07-11 NOTE — PROGRESS NOTES
[facility] Dermatology Note  Encounter Date: Jul 11, 2022  Telephone ([phone]). Location of teledermatologist: [facility]ERMAT[facility] [place].  Start time: 9:40. End time: 10:08.    Dermatology Problem List:  1. Eczematous dermatitis: may have component of ACD; MF has been a diagnostic consideration in past but biopsies/clinical not supportive  - outside biopsy x2: subacute spongiotic dermatitis  - current therapy: dupilumab, tacrolimus oint BID, urea 20% cream BID  - previous therapy: mycophenolate 1000 mg BID, cetirizine 10/20 mg, desoximetasone/desonide/fluocinonide, nbUVB phototherapy  - in reserve: methotrexate  2. Actinic purpura: due in part to topical steroid use     ____________________________________________    Assessment & Plan:     1. Eczematous dermatitis  Previously very well controlled on dupilumab, now with flares ever since undergoing several aneurysm repair procedures/imaging. Discussed that we suspect either the physical stress from surgery and/or topical agents (eg: chlorhexidine, povidine-iodine or other topical antiseptics) used during these procedures could be contributing to his more recent flares. The patient wonders if these rashes could be from contrast agents. Will refer to Dr. Abraham for patch testing. Photos today reveal more solar purpura and xerosis with scale than clear cut eczematous dermatitis so will optimize his topical regimen to address this.  - Continue dupilumab 300 mg q14 days  - Referral to Dr. Abraham for patch testing  - Stop betamethasone ointment  - Start tacrolimus BID  - Start urea 20% cream  - Continue Vanicream daily. Consider trying plain petroleum jelly.  - Start fexofenadine  mg daily  - Future: prednisone taper (held off given mainly just xerosis today and significant solar purpura), increase dupilumab frequency    2. Actinic purpura  Etiology reviewed. Suspect long term topical steroid and past oral steroid use  playing a role.    Procedures Performed:    None    Follow-up: 3 month(s) virtually (telephone with photos) or in person, or earlier for new or changing lesions    Staff and Resident:     Willow Paniagua MD  Dermatology Resident PGY3    Staff Physician Comments:   I evaluated any available patient photographs with the resident and I edited the assessment and plan as documented in the note. I was present on the line and participated in the entire telephone call    Over 33 minutes was spent during this visit on the date of service, including direct contact time with the patient counseling and coordinating care, review of the medical records, and documentation of diagnosis, natural history, treatment, and prognosis.       Gilberto Virgen MD   of Dermatology  Department of Dermatology  [facility]      ____________________________________________    CC: RECHECK (Pt states eczema worse than before )    HPI:  Mr. Junaid Cormier is a(n) 72 year old male who presents today as a return patient for eczematous dermatitis.     He and his wife are on the line today. She notes that after starting dupilumab he got much better, but since his aneurysm repair on March 10 he has had multiple flares. He first flared after this March procedure. This procedure failed so he had a repeat procedure May 3rd for bypass/re-stenting; again, he seemed to flare after this procedure. Since these procedure he has had multiple flares. His wife wonders if this is related to contrast. On May 21, for unclear reason, his face and hands became swollen. Went to the ER and got benadryl and prednisone; this resolved in 12 hours. No throat or tongue swelling or difficulty swallowing. Then around Father's Day nad severe itching, redness over arms, nauseea, chills. Took Benadryl. Episode lasted 12 hours. Right now he has scaling on the knees and the arms. Using betamethasone twice daily, Vanicream  everywhere daily, OTC eczaderm. They have not done patch testing.    Patient is otherwise feeling well, without additional skin concerns.    Labs Reviewed:  N/A    Physical Exam:  Vitals: There were no vitals taken for this visit.  SKIN: Teledermatology photos were reviewed; image quality and interpretability: acceptable. Image date: 07/11/22.  - There is flaky xerosis of the forearms and knees  - Purpuric patches/plaques on the bilateral forearms and dorsal hands  - No other lesions of concern on areas examined.     Medications:  Current Outpatient Medications   Medication     augmented betamethasone dipropionate (DIPROLENE) 0.05 % external lotion     augmented betamethasone dipropionate (DIPROLENE-AF) 0.05 % external ointment     cetirizine HCl 10 MG CAPS     desonide (DESOWEN) 0.05 % external ointment     Dupilumab 300 MG/2ML SOPN     Dupilumab 300 MG/2ML SOPN     Dupilumab 300 MG/2ML SOPN     hydrOXYzine (ATARAX) 25 MG tablet     lisinopril (ZESTRIL) 20 MG tablet     predniSONE (DELTASONE) 20 MG tablet     mometasone (ELOCON) 0.1 % external ointment     No current facility-administered medications for this visit.      Past Medical/Surgical History:   Patient Active Problem List   Diagnosis     Intrinsic atopic dermatitis     No past medical history on file.    CC Art Vazquez MD  [facility]  [address] [place] [address] on close of this encounter.

## 2022-07-11 NOTE — NURSING NOTE
Chief Complaint   Patient presents with     RECHECK     Pt states eczema worse than before      Teledermatology Nurse Call Patients:     Are you in the [facility] at the time of the encounter? yes    Today's visit will be billed to you and your insurance.    A teledermatology visit is not as thorough as an in-person visit and the quality of the photograph sent may not be of the same quality as that taken by the dermatology clinic.    LOREN Justin

## 2022-08-25 NOTE — TELEPHONE ENCOUNTER
FUTURE VISIT INFORMATION      FUTURE VISIT INFORMATION:    Date: 11.29.22    Time: 11:00    Location: [facility]  REFERRAL INFORMATION:    Referring provider: Param    Referring providers clinic:  Derm    Reason for visit/diagnosis  Intrinsic atopic dermatitis/patch testing    RECORDS REQUESTED FROM:       Clinic name Comments Records Status Imaging Status   Derm 7.11.22, 6.5.22, 5.18.22 + more with  Virgen Epic Epic   [facility] 5.22.22  [facility] Epic na

## 2022-08-31 DIAGNOSIS — L20.84 INTRINSIC ATOPIC DERMATITIS: ICD-10-CM

## 2022-09-02 ENCOUNTER — MYC MEDICAL ADVICE (OUTPATIENT)
Dept: DERMATOLOGY | Facility: CLINIC | Age: 72
End: 2022-09-02

## 2022-09-02 DIAGNOSIS — L20.84 INTRINSIC ATOPIC DERMATITIS: Primary | ICD-10-CM

## 2022-09-06 NOTE — TELEPHONE ENCOUNTER
Dupilumab 300 MG/2ML SOPN      Last Written Prescription Date:  3/2/2022  Last Fill Quantity: 12 ml,   # refills: 1  Last Office Visit : 7/11/2022  Future Office visit:  10/17/2022    Routing refill request to provider for review/approval because:  Medication not on the Dermatology refill protocol.

## 2022-09-07 NOTE — TELEPHONE ENCOUNTER
Received refill request for dupilumab as the resident on call. Reviewed patient's chart and attached communication. Patient last seen 7/2022 by Dr. Virgen at which time plan was to continue this medication until next followup. RTC has been scheduled for 10/2022. After reviewing the medication list and assessment and plan from last visit, the refill request was accepted for a course to bridge to next planned visit.    CC: Dr. Virgen as APRIL.    Magdy Dowell MD  Medicine/Dermatology PGY-2  *[phone]

## 2022-09-08 RX ORDER — UREA 200 MG/G
CREAM TOPICAL
Qty: 120 G | Refills: 3 | Status: SHIPPED | OUTPATIENT
Start: 2022-09-08 | End: 2022-09-23

## 2022-09-08 RX ORDER — TACROLIMUS 1 MG/G
OINTMENT TOPICAL
Qty: 100 G | Refills: 3 | Status: SHIPPED | OUTPATIENT
Start: 2022-09-08 | End: 2023-06-20

## 2022-09-08 RX ORDER — BETAMETHASONE DIPROPIONATE 0.5 MG/G
OINTMENT, AUGMENTED TOPICAL 2 TIMES DAILY
Qty: 100 G | Refills: 3 | Status: SHIPPED | OUTPATIENT
Start: 2022-09-08 | End: 2023-07-18

## 2022-09-08 RX ORDER — DESONIDE 0.5 MG/G
OINTMENT TOPICAL
Qty: 60 G | Refills: 0 | Status: SHIPPED | OUTPATIENT
Start: 2022-09-08 | End: 2022-09-23

## 2022-09-10 ENCOUNTER — HEALTH MAINTENANCE LETTER (OUTPATIENT)
Age: 72
End: 2022-09-10

## 2022-09-10 NOTE — TELEPHONE ENCOUNTER
Please contact patient and reschedule for a Sept appointment. Okay to use an autoimmune visit. If none available, can double book in a 40-min autoimmune visit slot. Thanks!

## 2022-09-23 ENCOUNTER — VIRTUAL VISIT (OUTPATIENT)
Dept: DERMATOLOGY | Facility: CLINIC | Age: 72
End: 2022-09-23
Payer: MEDICARE

## 2022-09-23 DIAGNOSIS — L20.84 INTRINSIC ATOPIC DERMATITIS: Primary | ICD-10-CM

## 2022-09-23 PROCEDURE — 99214 OFFICE O/P EST MOD 30 MIN: CPT | Mod: 95 | Performed by: DERMATOLOGY

## 2022-09-23 RX ORDER — UREA 200 MG/G
CREAM TOPICAL
Qty: 480 G | Refills: 11 | Status: SHIPPED | OUTPATIENT
Start: 2022-09-23

## 2022-09-23 RX ORDER — DESONIDE 0.5 MG/G
OINTMENT TOPICAL
Qty: 60 G | Refills: 3 | Status: SHIPPED | OUTPATIENT
Start: 2022-09-23 | End: 2023-04-20

## 2022-09-23 NOTE — PROGRESS NOTES
[facility] Dermatology Note  Encounter Date: Sep 23, 2022  Store-and-Forward and Telephone ([phone]). Location of teledermatologist: [facility]ERMATOLOGY [facility]IN[facility] [place].  Start time: 9:52. End time: 10:24.    Dermatology Problem List:  1. Eczematous dermatitis: may have component of ACD; MF has been a diagnostic consideration in past but biopsies/clinical not supportive  - outside biopsy x2: subacute spongiotic dermatitis  - current therapy: dupilumab, tacrolimus ointment BID, urea 20% cream BID  - previous therapy: mycophenolate 1000 mg BID, cetirizine 10/20 mg, desoximetasone/desonide/fluocinonide, nbUVB phototherapy  - in reserve: methotrexate  2. Actinic purpura: due in part to topical steroid use     ____________________________________________    Assessment & Plan:     1. Eczematous dermatitis: overall much improved since beginning of the month, but still having periodic flares of unclear triggers. Has appointment with dermatoallergology in a couple of months for further assessment of possible contributions from allergic contact dermatitis. We discussed alternatives to dupilumab including tralukinumab and CLARKE inhibitors; disfavor the latter due to age >65 years and history of tobacco use. Encouraged topicals as needed.  - dupilumab 300 mg q14 days  - topicals as needed  - upcoming dermatoallergology visit    Procedures Performed:    None    Follow-up: 3-4 months    Staff:     Over 30 minutes was spent during this visit on the date of service, including direct contact time with the patient counseling and coordinating care, review of the medical records, and documentation of diagnosis, natural history, treatment, and prognosis. This excludes time performing any relevant procedures.    Gilberto Virgen MD, FAAD   of Dermatology  Department of Dermatology  [facility]    ____________________________________________    CC:  Derm Problem (Sean, is here for a follow up on his eczema and states he is itchy. )    HPI:  Mr. Junaid Cormier is a(n) 72 year old male who presents today as a return patient for eczema    Eczema - cyclic flares - had big flare at beginning of the month  - now calmed down again  - topicals seem to be helpful - betamethasone, tacrolimus, CeraVe, coconut oil, urea  - with bad flare, was out of betamethasone  - feeling cold all the time  - using nitrile gloves for nighttime occlusion    Has upcoming appointment with Dr. Abraham in November for allergy testing    Patient is otherwise feeling well, without additional skin concerns.    Labs Reviewed:  N/A    Physical Exam:  Vitals: There were no vitals taken for this visit.  SKIN: Teledermatology photos were reviewed; image quality and interpretability: acceptable. Image date: 9/23/22.  - some fissuring on the hands  - significant reduction in scaling and erythema since photos earlier this month  - No other lesions of concern on areas examined.     Medications:  Current Outpatient Medications   Medication     augmented betamethasone dipropionate (DIPROLENE) 0.05 % external lotion     augmented betamethasone dipropionate (DIPROLENE-AF) 0.05 % external ointment     cetirizine HCl 10 MG CAPS     desonide (DESOWEN) 0.05 % external ointment     dupilumab (DUPIXENT) 300 MG/2ML prefilled pen     dupilumab (DUPIXENT) 300 MG/2ML prefilled pen     fexofenadine (ALLEGRA) 180 MG tablet     hydrOXYzine (ATARAX) 25 MG tablet     hydrOXYzine (ATARAX) 25 MG tablet     lisinopril (ZESTRIL) 20 MG tablet     predniSONE (DELTASONE) 20 MG tablet     tacrolimus (PROTOPIC) 0.1 % external ointment     urea (GORMEL) 20 % external cream     mometasone (ELOCON) 0.1 % external ointment     No current facility-administered medications for this visit.      Past Medical/Surgical History:   Patient Active Problem List   Diagnosis     Intrinsic atopic dermatitis     No past medical history on  file.    CC Art Vazquez MD  [facility]  [address] [place] [address] on close of this encounter.

## 2022-09-23 NOTE — LETTER
9/23/2022       RE: Junaid Cormier  [address] [place] [address]     Dear Colleague,    Thank you for referring your patient, Junaid Cormier, to the [facility] DERMATOLOGY CLINIC [place] at [facility]. Please see a copy of my visit note below.    [facility] Dermatology Note  Encounter Date: Sep 23, 2022  Store-and-Forward and Telephone ([phone]). Location of teledermatologist: [facility] DERMATOLOGY [facility] [place].  Start time: 9:52. End time: 10:24.    Dermatology Problem List:  1. Eczematous dermatitis: may have component of ACD; MF has been a diagnostic consideration in past but biopsies/clinical not supportive  - outside biopsy x2: subacute spongiotic dermatitis  - current therapy: dupilumab, tacrolimus ointment BID, urea 20% cream BID  - previous therapy: mycophenolate 1000 mg BID, cetirizine 10/20 mg, desoximetasone/desonide/fluocinonide, nbUVB phototherapy  - in reserve: methotrexate  2. Actinic purpura: due in part to topical steroid use     ____________________________________________    Assessment & Plan:     1. Eczematous dermatitis: overall much improved since beginning of the month, but still having periodic flares of unclear triggers. Has appointment with dermatoallergology in a couple of months for further assessment of possible contributions from allergic contact dermatitis. We discussed alternatives to dupilumab including tralukinumab and CLARKE inhibitors; disfavor the latter due to age >65 years and history of tobacco use. Encouraged topicals as needed.  - dupilumab 300 mg q14 days  - topicals as needed  - upcoming dermatoallergology visit    Procedures Performed:    None    Follow-up: 3-4 months    Staff:     Over 30 minutes was spent during this visit on the date of service, including direct contact time with the patient counseling and coordinating care, review of the medical records, and  documentation of diagnosis, natural history, treatment, and prognosis. This excludes time performing any relevant procedures.    Gilberto Virgen MD, FAAD   of Dermatology  Department of Dermatology  [facility]    ____________________________________________    CC: Derm Problem (Sean, is here for a follow up on his eczema and states he is itchy. )    HPI:  Mr. Junaid Cormier is a(n) 72 year old male who presents today as a return patient for eczema    Eczema - cyclic flares - had big flare at beginning of the month  - now calmed down again  - topicals seem to be helpful - betamethasone, tacrolimus, CeraVe, coconut oil, urea  - with bad flare, was out of betamethasone  - feeling cold all the time  - using nitrile gloves for nighttime occlusion    Has upcoming appointment with Dr. Abraham in November for allergy testing    Patient is otherwise feeling well, without additional skin concerns.    Labs Reviewed:  N/A    Physical Exam:  Vitals: There were no vitals taken for this visit.  SKIN: Teledermatology photos were reviewed; image quality and interpretability: acceptable. Image date: 9/23/22.  - some fissuring on the hands  - significant reduction in scaling and erythema since photos earlier this month  - No other lesions of concern on areas examined.     Medications:  Current Outpatient Medications   Medication     augmented betamethasone dipropionate (DIPROLENE) 0.05 % external lotion     augmented betamethasone dipropionate (DIPROLENE-AF) 0.05 % external ointment     cetirizine HCl 10 MG CAPS     desonide (DESOWEN) 0.05 % external ointment     dupilumab (DUPIXENT) 300 MG/2ML prefilled pen     dupilumab (DUPIXENT) 300 MG/2ML prefilled pen     fexofenadine (ALLEGRA) 180 MG tablet     hydrOXYzine (ATARAX) 25 MG tablet     hydrOXYzine (ATARAX) 25 MG tablet     lisinopril (ZESTRIL) 20 MG tablet     predniSONE (DELTASONE) 20 MG tablet     tacrolimus (PROTOPIC)  0.1 % external ointment     urea (GORMEL) 20 % external cream     mometasone (ELOCON) 0.1 % external ointment     No current facility-administered medications for this visit.      Past Medical/Surgical History:   Patient Active Problem List   Diagnosis     Intrinsic atopic dermatitis     No past medical history on file.    CC Art Vazquez MD  [facility] [[email or web address]]  [address] [place] [address] on close of this encounter.

## 2022-11-05 DIAGNOSIS — L20.84 INTRINSIC ATOPIC DERMATITIS: ICD-10-CM

## 2022-11-09 RX ORDER — FEXOFENADINE HCL 180 MG/1
180 TABLET ORAL 2 TIMES DAILY
Qty: 60 TABLET | Refills: 3 | Status: SHIPPED | OUTPATIENT
Start: 2022-11-09 | End: 2023-04-20

## 2022-11-09 NOTE — TELEPHONE ENCOUNTER
fexofenadine (ALLEGRA) 180 MG tablet      Last Written Prescription Date:  7-11-22  Last Fill Quantity: 60,   # refills: 3  Last Office Visit : 9-23-22  Future Office visit:  12-16-22    Routing refill request to provider for review/approval because:  Failed protocol: AGE

## 2022-11-09 NOTE — TELEPHONE ENCOUNTER
Refill request for Allegra received and approved as the resident on call. Patient last seen by Dr. Virgen for 7/2022 atopic dermatitis at which time the plan was to start Allegra daily.RTC scheduled with  Dr. Abraham 11/29/22 and RTC with Dr. Virgen 12/2022.     Attendings cc'd as an FYI.     Lupe Chauhan MD  Dermatology Resident, PGY-2

## 2022-11-24 NOTE — PROGRESS NOTES
[facility] Dermato-allergology Note  Office visit  Encounter Date: Nov 29, 2022  ____________________________________________    CC: No chief complaint on file.      HPI:  (Nov 29, 2022)  Mr. Junaid Cormier is a(n) 72 year old male who presents today as new patient for allergy consultation  - patient developed more than 2 years ago a generalized eczematous rash and Dr. Virgen put the patient on Dupixent September 2021 and it worked perfectly until a stent operation in March/Mai 2022 --> since then even on Dupixent recurrent flare ups  - otherwise feeling well in usual state of health    Physical exam:  General: In no acute distress, well-developed, well-nourished  Eyes: no conjunctivitis  ENT: no signs of rhinitis   Pulmonary: no wheezing or coughing  Skin: skin still very dry and on then hands very lichenified and still dermatitis even on Dupixent. Only about 70% cleared up and as well itching sometimes recurrent    Past Medical History:   Patient Active Problem List   Diagnosis     Intrinsic atopic dermatitis     No past medical history on file.    Allergies:  No Known Allergies    Medications:  Current Outpatient Medications   Medication     augmented betamethasone dipropionate (DIPROLENE) 0.05 % external lotion     augmented betamethasone dipropionate (DIPROLENE-AF) 0.05 % external ointment     cetirizine HCl 10 MG CAPS     desonide (DESOWEN) 0.05 % external ointment     dupilumab (DUPIXENT) 300 MG/2ML prefilled pen     dupilumab (DUPIXENT) 300 MG/2ML prefilled pen     fexofenadine (ALLEGRA) 180 MG tablet     hydrOXYzine (ATARAX) 25 MG tablet     hydrOXYzine (ATARAX) 25 MG tablet     lisinopril (ZESTRIL) 20 MG tablet     tacrolimus (PROTOPIC) 0.1 % external ointment     urea (GORMEL) 20 % external cream     No current facility-administered medications for this visit.       Social History:  The patient is retired. Patient has the following hobbies or non-occupational exposure: golf,  ski    Family History:  No family history on file.    Previous Labs, Allergy Tests, Dermatopathology, Imaging:  See files of Dr. Virgen    Referred By: Gilberto iVrgen MD  [address] [place] [address]     Allergy Tests:    Past Allergy Test    Order for Future Allergy Testing:    [x] Outpatient  [] Inpatient: [facility]..../ Bed ....       Skin Atopy (atopic dermatitis) [] Yes   [x] No ..but brother had aD as child  Contact allergies:   [x] Yes   [] No ..Deodorant   Hand eczema:   [x] Yes   [] No           Leading hand:   [] R   [] L       [] Ambidextrous         Drug allergies:        [] Yes   [x] No  which?......    Urticaria/Angioedema  [] Yes   [x] No .........  Food Allergy:  [] Yes   [x] No  which?......  Pets :  [] Yes   [x] No  Which?...never problems with pets        [x]  Rhinitis   [] Conjunctivitis   [] Sinusitis   [] Polyposis   [] Otitis   [] Pharyngitis         []  Postnasal drip    [x]  none recently constantly runny nose  Operations:   [] Tonsils   [] Septum   [] Sinus   [] Polyposis        [] Asthma bronchiale   [] Coughing      [x]  none  Symptoms (mostly Rhinoconjunctivitis and Asthma) aggravated by:  Season   [] I   [] II   [] III   [] IV   []V   []VI   []VII   []VIII   []IX   []X   []XI   []XII     [] perennial   Day time      [] morning   [] noon      [] evening        [] night    [] whole day........  []  none  Location/changes    [] inside        [] outside   [] mountains    [] sea     [] others.............   []  none  Triggers, specific     [] animals     [] plants     [] dust              [] others ...........................    []  none  Triggers, others       [] work          [] psyche    [] sport            [] others .............................  []  none  Irritant                [] phys efforts [] smoke    [] heat/cold     [] odors  []others............... []  none    Order for PATCH TESTS  Reason for tests (suspected allergy): to rule out additional contact sensitization  Known  previous allergies: none  Standardized panels  [x] Standard panel (40 tests)  [x] Preservatives & Antimicrobials (31 tests)  [x] Emulsifiers & Additives (25 tests)   [x] Perfumes/Flavours & Plants (25 tests)  [] Hairdresser panel (12 tests)  [] Rubber Chemicals (22 tests)  [] Plastics (26 tests)  [] Colorants/Dyes/Food additives (20 tests)  [] Metals (implants/dental) (24 tests)  [] Local anaesthetics/NSAIDs (13 tests)  [] Antibiotics & Antimycotics (14 tests)   [x] Corticosteroids (15 tests)   [] Photopatch test (62 tests)   [] others: ...      [] Patient's own products: ...    DO NOT test if chemical or biological identity is unknown!     always ask from patient the product information and safety sheets (MSDS)       Order for PRICK TESTS    Reason for tests (suspected allergy): atopy?  Known previous allergies: none    Standardized prick panels  [x] Atopic panel (20 tests)  [] Pediatric Panel (12 tests)  [] Milk, Meat, Eggs, Grains (20 tests)   [] Dust, Epithelia, Feathers (10 tests)  [] Fish, Seafood, Shellfish (17 tests)  [] Nuts, Beans (14 tests)  [] Spice, Vegetable, Fruit (17 tests)  [] Pollen Panel = Tree, Grass, Weed (24 tests)  [] Others: ...      [] Patient's own products: ...    DO NOT test if chemical or biological identity is unknown!     always ask from patient the product information and safety sheets (MSDS)     Standardized intradermal tests  [x] Penicillium notatum [x] Aspergillus fumigatus [x] House dust mites D.far & D. pteron  [] Cat    [] dog  [] Others: ...  [] Bee venom   [] Wasp venom  !!Specific protocol with dilutions!!       Order for Drug allergy tests (prick & Intradermal & patch tests)    [] Penicillin G  [] Ampicillin [] Cefazolin   [] Ceftriaxone   [] Ceftazidime  [] Bactrim    [] Others: ...  Order for ... 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 I would be happy to respond to any concerns or questions you may have.       >> plan prick and patch tests      Staff:  Provider    Follow-up in Derm-Allergy clinic for prick and patch tests as planned (stop antihistamines 1 week prior)    I spent a total of 30 minutes with Junaid Cormier. This time was spent counseling the patient and/or coordinating care, explaining the allergy tests, performing allergy tests and assessing the clinical relevance.

## 2022-11-29 ENCOUNTER — OFFICE VISIT (OUTPATIENT)
Dept: ALLERGY | Facility: CLINIC | Age: 72
End: 2022-11-29
Attending: DERMATOLOGY
Payer: MEDICARE

## 2022-11-29 ENCOUNTER — PRE VISIT (OUTPATIENT)
Dept: ALLERGY | Facility: CLINIC | Age: 72
End: 2022-11-29

## 2022-11-29 ENCOUNTER — TELEPHONE (OUTPATIENT)
Dept: DERMATOLOGY | Facility: CLINIC | Age: 72
End: 2022-11-29

## 2022-11-29 DIAGNOSIS — L20.84 INTRINSIC ATOPIC DERMATITIS: ICD-10-CM

## 2022-11-29 DIAGNOSIS — J30.89 SEASONAL AND PERENNIAL ALLERGIC RHINOCONJUNCTIVITIS: Primary | ICD-10-CM

## 2022-11-29 DIAGNOSIS — L23.89 ALLERGIC CONTACT DERMATITIS DUE TO OTHER AGENTS: ICD-10-CM

## 2022-11-29 DIAGNOSIS — H10.10 SEASONAL AND PERENNIAL ALLERGIC RHINOCONJUNCTIVITIS: Primary | ICD-10-CM

## 2022-11-29 DIAGNOSIS — J30.2 SEASONAL AND PERENNIAL ALLERGIC RHINOCONJUNCTIVITIS: Primary | ICD-10-CM

## 2022-11-29 PROCEDURE — 99214 OFFICE O/P EST MOD 30 MIN: CPT | Performed by: DERMATOLOGY

## 2022-11-29 RX ORDER — ATORVASTATIN CALCIUM 80 MG/1
80 TABLET, FILM COATED ORAL DAILY
COMMUNITY
Start: 2022-10-21

## 2022-11-29 NOTE — Clinical Note
11/29/2022         RE: Junaid Cormier  [address] [place] [address]        Dear Colleague,    Thank you for referring your patient, Junaid Cormier, to the [facility] [place]. Please see a copy of my visit note below.    [facility] Dermato-allergology Note  Office visit  Encounter Date: Nov 29, 2022  ____________________________________________    CC: No chief complaint on file.      HPI:  (Nov 29, 2022)  Mr. Junaid Cormier is a(n) 72 year old male who presents today as new patient for allergy consultation  - patient developed more than 2 years ago a generalized eczematous rash and Dr. Virgen put the patient on Dupixent September 2021 and it worked perfectly until a stent operation in March/Mai 2022 --> since then even on Dupixent recurrent flare ups  - otherwise feeling well in usual state of health    Physical exam:  General: In no acute distress, well-developed, well-nourished  Eyes: no conjunctivitis  ENT: no signs of rhinitis   Pulmonary: no wheezing or coughing  Skin: skin still very dry and on then hands very lichenified and still dermatitis even on Dupixent. Only about 70% cleared up and as well itching sometimes recurrent    Past Medical History:   Patient Active Problem List   Diagnosis     Intrinsic atopic dermatitis     No past medical history on file.    Allergies:  No Known Allergies    Medications:  Current Outpatient Medications   Medication     augmented betamethasone dipropionate (DIPROLENE) 0.05 % external lotion     augmented betamethasone dipropionate (DIPROLENE-AF) 0.05 % external ointment     cetirizine HCl 10 MG CAPS     desonide (DESOWEN) 0.05 % external ointment     dupilumab (DUPIXENT) 300 MG/2ML prefilled pen     dupilumab (DUPIXENT) 300 MG/2ML prefilled pen     fexofenadine (ALLEGRA) 180 MG tablet     hydrOXYzine (ATARAX) 25 MG tablet     hydrOXYzine (ATARAX) 25 MG tablet     lisinopril (ZESTRIL) 20 MG tablet      tacrolimus (PROTOPIC) 0.1 % external ointment     urea (GORMEL) 20 % external cream     No current facility-administered medications for this visit.       Social History:  The patient is retired. Patient has the following hobbies or non-occupational exposure: golf, ski    Family History:  No family history on file.    Previous Labs, Allergy Tests, Dermatopathology, Imaging:  See files of Dr. Virgen    Referred By: Gilberto Virgen MD  [address] [place] [address]     Allergy Tests:    Past Allergy Test    Order for Future Allergy Testing:    [x] Outpatient  [] Inpatient: [facility]..../ Bed ....       Skin Atopy (atopic dermatitis) [] Yes   [x] No ..but brother had aD as child  Contact allergies:   [x] Yes   [] No ..Deodorant   Hand eczema:   [x] Yes   [] No           Leading hand:   [] R   [] L       [] Ambidextrous         Drug allergies:        [] Yes   [x] No  which?......    Urticaria/Angioedema  [] Yes   [x] No .........  Food Allergy:  [] Yes   [x] No  which?......  Pets :  [] Yes   [x] No  Which?...never problems with pets        [x]  Rhinitis   [] Conjunctivitis   [] Sinusitis   [] Polyposis   [] Otitis   [] Pharyngitis         []  Postnasal drip    [x]  none recently constantly runny nose  Operations:   [] Tonsils   [] Septum   [] Sinus   [] Polyposis        [] Asthma bronchiale   [] Coughing      [x]  none  Symptoms (mostly Rhinoconjunctivitis and Asthma) aggravated by:  Season   [] I   [] II   [] III   [] IV   []V   []VI   []VII   []VIII   []IX   []X   []XI   []XII     [] perennial   Day time      [] morning   [] noon      [] evening        [] night    [] whole day........  []  none  Location/changes    [] inside        [] outside   [] mountains    [] sea     [] others.............   []  none  Triggers, specific     [] animals     [] plants     [] dust              [] others ...........................    []  none  Triggers, others       [] work          [] psyche    [] sport            [] others  .............................  []  none  Irritant                [] phys efforts [] smoke    [] heat/cold     [] odors  []others............... []  none    Order for PATCH TESTS  Reason for tests (suspected allergy): to rule out additional contact sensitization  Known previous allergies: none  Standardized panels  [x] Standard panel (40 tests)  [x] Preservatives & Antimicrobials (31 tests)  [x] Emulsifiers & Additives (25 tests)   [x] Perfumes/Flavours & Plants (25 tests)  [] Hairdresser panel (12 tests)  [] Rubber Chemicals (22 tests)  [] Plastics (26 tests)  [] Colorants/Dyes/Food additives (20 tests)  [] Metals (implants/dental) (24 tests)  [] Local anaesthetics/NSAIDs (13 tests)  [] Antibiotics & Antimycotics (14 tests)   [x] Corticosteroids (15 tests)   [] Photopatch test (62 tests)   [] others: ...      [] Patient's own products: ...    DO NOT test if chemical or biological identity is unknown!     always ask from patient the product information and safety sheets (MSDS)       Order for PRICK TESTS    Reason for tests (suspected allergy): atopy?  Known previous allergies: none    Standardized prick panels  [x] Atopic panel (20 tests)  [] Pediatric Panel (12 tests)  [] Milk, Meat, Eggs, Grains (20 tests)   [] Dust, Epithelia, Feathers (10 tests)  [] Fish, Seafood, Shellfish (17 tests)  [] Nuts, Beans (14 tests)  [] Spice, Vegetable, Fruit (17 tests)  [] Pollen Panel = Tree, Grass, Weed (24 tests)  [] Others: ...      [] Patient's own products: ...    DO NOT test if chemical or biological identity is unknown!     always ask from patient the product information and safety sheets (MSDS)     Standardized intradermal tests  [x] Penicillium notatum [x] Aspergillus fumigatus [x] House dust mites D.far & D. pteron  [] Cat    [] dog  [] Others: ...  [] Bee venom   [] Wasp venom  !!Specific protocol with dilutions!!       Order for Drug allergy tests (prick & Intradermal & patch tests)    [] Penicillin G  [] Ampicillin []  Cefazolin   [] Ceftriaxone   [] Ceftazidime  [] Bactrim    [] Others: ...  Order for ... as test date    [] Patient needs consultation with Allergy team (changes of tests may apply)  [x] Tests discussed with Allergy team (can have direct appointment for allergy tests)     ________________________________    Assessment & Plan:    ==> Final Diagnosis:     # generalized eczematous dermatitis partially responding to Dupixent  atopic dermatitis (intrinsic?) and/or allergic contact dermatitis  {jgstatus:962783}    # atopic predisposition? 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 I would be happy to respond to any concerns or questions you may have.       >> plan prick and patch tests      Staff:  Provider    Follow-up in Derm-Allergy clinic for prick and patch tests as planned (stop antihistamines 1 week prior)    I spent a total of 30 minutes with Junaid Cormier. This time was spent counseling the patient and/or coordinating care, explaining the allergy tests, performing allergy tests and assessing the clinical relevance.        Again, thank you for allowing me to participate in the care of your patient.        Sincerely,        Quincy Abraham MD

## 2022-11-29 NOTE — TELEPHONE ENCOUNTER
PA Initiation    Medication: Adbry  Insurance Company: [employer] ([facility]) - Phone [phone] Fax [phone]  Pharmacy Filling the Rx: [facility] - [place], [place] - [address]  Filling Pharmacy Phone:    Filling Pharmacy Fax:    Start Date: 11/29/2022    Key: [id number]

## 2022-11-29 NOTE — NURSING NOTE
Dermatology Rooming Note    Junaid Cormier's goals for this visit include:   Chief Complaint   Patient presents with     Allergy Consult     Patch testing consult - referred by Dr. Param Schultz, CMA

## 2022-12-02 NOTE — TELEPHONE ENCOUNTER
Prior Authorization Approval    Authorization Effective Date: 11/29/2022  Authorization Expiration Date: 12/31/2023  Medication: Adbry  Approved Dose/Quantity: 6ml for 28 days  Reference #: Key: [id number]   Insurance Company: [employer] ([facility]) - Phone [phone] Fax [phone]  Expected CoPay: $2063.86     CoPay Card Available: No    Foundation Assistance Needed: [employer]  Which Pharmacy is filling the prescription (Not needed for infusion/clinic administered): [facility] - [address]  Pharmacy Notified: No  Patient Notified: Yes

## 2022-12-09 NOTE — TELEPHONE ENCOUNTER
Spoke with patients significant other and they would like to try for free drug. Emailed pap to patient and clinic (fish)

## 2022-12-12 NOTE — TELEPHONE ENCOUNTER
Free Drug Application Initiated  Medication: Adbry  Sponsor: [employer]  Phone #: [phone]  Fax #: [phone]  Additional Information: faxed provider portion

## 2022-12-14 NOTE — TELEPHONE ENCOUNTER
Received email from [employer]. They have received patient and provider portion of application and are currently reviewing.

## 2022-12-16 ENCOUNTER — VIRTUAL VISIT (OUTPATIENT)
Dept: DERMATOLOGY | Facility: CLINIC | Age: 72
End: 2022-12-16
Payer: MEDICARE

## 2022-12-16 ENCOUNTER — TELEPHONE (OUTPATIENT)
Dept: DERMATOLOGY | Facility: CLINIC | Age: 72
End: 2022-12-16

## 2022-12-16 DIAGNOSIS — L20.84 INTRINSIC ATOPIC DERMATITIS: Primary | ICD-10-CM

## 2022-12-16 PROCEDURE — 99213 OFFICE O/P EST LOW 20 MIN: CPT | Mod: 95 | Performed by: DERMATOLOGY

## 2022-12-16 RX ORDER — TAMSULOSIN HYDROCHLORIDE 0.4 MG/1
0.4 CAPSULE ORAL
COMMUNITY

## 2022-12-16 NOTE — NURSING NOTE
Chief Complaint   Patient presents with     Follow Up     3 month follow up - Eczema      Teledermatology Nurse Call Patients:     Are you in the [facility] at the time of the encounter? yes    Today's visit will be billed to you and your insurance.    A teledermatology visit is not as thorough as an in-person visit and the quality of the photograph sent may not be of the same quality as that taken by the dermatology clinic.    Shelby Kocher

## 2022-12-16 NOTE — PROGRESS NOTES
[facility] Dermatology Note  Encounter Date: Dec 16, 2022  Telephone ([phone] ). Location of teledermatologist: [facility] [place]. Start time: 9:30. End time: 9:43.    Dermatology Problem List:  1. Eczematous dermatitis: may have component of ACD; MF has been a diagnostic consideration in past but biopsies/clinical not supportive  - outside biopsy x2: subacute spongiotic dermatitis  - current therapy: dupilumab, tacrolimus ointment BID, urea 20% cream BID  - previous therapy: mycophenolate 1000 mg BID, cetirizine 10/20 mg, desoximetasone/desonide/fluocinonide, nbUVB phototherapy  - in reserve: methotrexate, tralokinumab  2. Actinic purpura: due in part to topical steroid use     ____________________________________________    Assessment & Plan:     1. Atopic dermatitis: planning to switch to tralokinumab, but hasn't received medication yet so still on dupilumab. Currently eczema is doing well - only minor itching. We discussed consistent use of Vanicream, and uptitration to topical steroids PRN.  - start tralokinumab when received, 2 weeks after last dupilumab dose  - topicals PRN  - consistent moisturizers    2. Actinic purpura: resolved in context of decreased topical steroid requirements. Some residual postinflammatory hypopigmentation.    Procedures Performed:    None    Follow-up: 4 months    Staff:     Gilberto Virgen MD, FAAD   of Dermatology  Department of Dermatology  [facility]    ____________________________________________    CC: Follow Up (3 month follow up - Eczema )    HPI:  Mr. Junaid Cormier is a(n) 72 year old male who presents today as a return patient for dermatitis    Atopic dermatitis - overall doing well - minor itching  - no scratches or bleeding  - getting some hair regrowth in areas of prior loss  - still on dupilumab  - uses Vanicream once weekly; uses topicals PRN    Actinic  purpura has resolved  - some hypopigmentation    Patient is otherwise feeling well, without additional skin concerns.    Labs Reviewed:  N/A    Physical Exam:  Vitals: There were no vitals taken for this visit.  SKIN: no photos    Medications:  Current Outpatient Medications   Medication     aspirin (ASA) 81 MG EC tablet     atorvastatin (LIPITOR) 80 MG tablet     dupilumab (DUPIXENT) 300 MG/2ML prefilled pen     dupilumab (DUPIXENT) 300 MG/2ML prefilled pen     lisinopril (ZESTRIL) 20 MG tablet     tamsulosin (FLOMAX) 0.4 MG capsule     augmented betamethasone dipropionate (DIPROLENE) 0.05 % external lotion     augmented betamethasone dipropionate (DIPROLENE-AF) 0.05 % external ointment     cetirizine HCl 10 MG CAPS     desonide (DESOWEN) 0.05 % external ointment     fexofenadine (ALLEGRA) 180 MG tablet     hydrOXYzine (ATARAX) 25 MG tablet     hydrOXYzine (ATARAX) 25 MG tablet     tacrolimus (PROTOPIC) 0.1 % external ointment     Tralokinumab-ldrm 150 MG/ML SOSY     urea (GORMEL) 20 % external cream     No current facility-administered medications for this visit.      Past Medical/Surgical History:   Patient Active Problem List   Diagnosis     Intrinsic atopic dermatitis     No past medical history on file.    CC Art Vazquez MD  [address] [place] [address] on close of this encounter.

## 2022-12-16 NOTE — LETTER
12/16/2022       RE: Junaid Cormier  [address] [place] [address]     Dear Colleague,    Thank you for referring your patient, Junaid Cormier, to the [facility] [place] at [facility]. Please see a copy of my visit note below.    [facility] Dermatology Note  Encounter Date: Dec 16, 2022  Telephone ([phone] ). Location of teledermatologist: [facility] [place]. Start time: 9:30. End time: 9:43.    Dermatology Problem List:  1. Eczematous dermatitis: may have component of ACD; MF has been a diagnostic consideration in past but biopsies/clinical not supportive  - outside biopsy x2: subacute spongiotic dermatitis  - current therapy: dupilumab, tacrolimus ointment BID, urea 20% cream BID  - previous therapy: mycophenolate 1000 mg BID, cetirizine 10/20 mg, desoximetasone/desonide/fluocinonide, nbUVB phototherapy  - in reserve: methotrexate, tralokinumab  2. Actinic purpura: due in part to topical steroid use     ____________________________________________    Assessment & Plan:     1. Atopic dermatitis: planning to switch to tralokinumab, but hasn't received medication yet so still on dupilumab. Currently eczema is doing well - only minor itching. We discussed consistent use of Vanicream, and uptitration to topical steroids PRN.  - start tralokinumab when received, 2 weeks after last dupilumab dose  - topicals PRN  - consistent moisturizers    2. Actinic purpura: resolved in context of decreased topical steroid requirements. Some residual postinflammatory hypopigmentation.    Procedures Performed:    None    Follow-up: 4 months    Staff:     Gilberto Virgen MD, FAAD   of Dermatology  Department of Dermatology  [facility]    ____________________________________________    CC: Follow Up (3 month follow up - Eczema  )    HPI:  Mr. Junaid Cormier is a(n) 72 year old male who presents today as a return patient for dermatitis    Atopic dermatitis - overall doing well - minor itching  - no scratches or bleeding  - getting some hair regrowth in areas of prior loss  - still on dupilumab  - uses Vanicream once weekly; uses topicals PRN    Actinic purpura has resolved  - some hypopigmentation    Patient is otherwise feeling well, without additional skin concerns.    Labs Reviewed:  N/A    Physical Exam:  Vitals: There were no vitals taken for this visit.  SKIN: no photos    Medications:  Current Outpatient Medications   Medication     aspirin (ASA) 81 MG EC tablet     atorvastatin (LIPITOR) 80 MG tablet     dupilumab (DUPIXENT) 300 MG/2ML prefilled pen     dupilumab (DUPIXENT) 300 MG/2ML prefilled pen     lisinopril (ZESTRIL) 20 MG tablet     tamsulosin (FLOMAX) 0.4 MG capsule     augmented betamethasone dipropionate (DIPROLENE) 0.05 % external lotion     augmented betamethasone dipropionate (DIPROLENE-AF) 0.05 % external ointment     cetirizine HCl 10 MG CAPS     desonide (DESOWEN) 0.05 % external ointment     fexofenadine (ALLEGRA) 180 MG tablet     hydrOXYzine (ATARAX) 25 MG tablet     hydrOXYzine (ATARAX) 25 MG tablet     tacrolimus (PROTOPIC) 0.1 % external ointment     Tralokinumab-ldrm 150 MG/ML SOSY     urea (GORMEL) 20 % external cream     No current facility-administered medications for this visit.      Past Medical/Surgical History:   Patient Active Problem List   Diagnosis     Intrinsic atopic dermatitis     No past medical history on file.    CC Art Vazquez MD  [address] [place] [address] on close of this encounter.

## 2022-12-19 NOTE — TELEPHONE ENCOUNTER
Spoke with [facility], they have received application and are currently reviewing for PAP and will reach out to patient.

## 2022-12-22 NOTE — TELEPHONE ENCOUNTER
Spoke with [facility], they are working with patient on additional forms needed for them to review for free drug program. Will check back next week for another update, they are closed 12/23 and 12/26

## 2022-12-28 NOTE — TELEPHONE ENCOUNTER
[facility] states nothing further is needed, nurse advocate has scheduled call with patient to discuss 2023 PAP on 1/3.     Thanks!  Willard Aguilera [facility]   Specialty/Endo Pharmacy Liaison Float  P [phone]  F [phone]

## 2023-01-01 ENCOUNTER — TELEPHONE (OUTPATIENT)
Dept: DERMATOLOGY | Facility: CLINIC | Age: 73
End: 2023-01-01

## 2023-01-01 ENCOUNTER — TELEPHONE (OUTPATIENT)
Dept: DERMATOLOGY | Facility: CLINIC | Age: 73
End: 2023-01-01
Payer: MEDICARE

## 2023-01-01 ENCOUNTER — VIRTUAL VISIT (OUTPATIENT)
Dept: DERMATOLOGY | Facility: CLINIC | Age: 73
End: 2023-01-01
Payer: MEDICARE

## 2023-01-01 ENCOUNTER — MYC MEDICAL ADVICE (OUTPATIENT)
Dept: DERMATOLOGY | Facility: CLINIC | Age: 73
End: 2023-01-01
Payer: MEDICARE

## 2023-01-01 ENCOUNTER — OFFICE VISIT (OUTPATIENT)
Dept: DERMATOLOGY | Facility: CLINIC | Age: 73
End: 2023-01-01
Payer: MEDICARE

## 2023-01-01 DIAGNOSIS — L30.9 DERMATITIS: ICD-10-CM

## 2023-01-01 DIAGNOSIS — L20.84 INTRINSIC ATOPIC DERMATITIS: ICD-10-CM

## 2023-01-01 DIAGNOSIS — D48.5 NEOPLASM OF UNCERTAIN BEHAVIOR OF SKIN: ICD-10-CM

## 2023-01-01 DIAGNOSIS — L20.84 INTRINSIC ATOPIC DERMATITIS: Primary | ICD-10-CM

## 2023-01-01 DIAGNOSIS — L30.9 DERMATITIS: Primary | ICD-10-CM

## 2023-01-01 LAB
LAB DIRECTOR COMMENTS: NORMAL
LAB DIRECTOR COMMENTS: NORMAL
LAB DIRECTOR DISCLAIMER: NORMAL
LAB DIRECTOR DISCLAIMER: NORMAL
LAB DIRECTOR INTERPRETATION: NORMAL
LAB DIRECTOR INTERPRETATION: NORMAL
LAB DIRECTOR METHODOLOGY: NORMAL
LAB DIRECTOR METHODOLOGY: NORMAL
LAB DIRECTOR RESULTS: NORMAL
LAB DIRECTOR RESULTS: NORMAL
PATH REPORT.COMMENTS IMP SPEC: NORMAL
PATH REPORT.FINAL DX SPEC: NORMAL
PATH REPORT.GROSS SPEC: NORMAL
PATH REPORT.MICROSCOPIC SPEC OTHER STN: NORMAL
PATH REPORT.RELEVANT HX SPEC: NORMAL
SPECIMEN DESCRIPTION: NORMAL
SPECIMEN DESCRIPTION: NORMAL

## 2023-01-01 PROCEDURE — 99443 PR PHYSICIAN TELEPHONE EVALUATION 21-30 MIN: CPT | Mod: 95 | Performed by: DERMATOLOGY

## 2023-01-01 PROCEDURE — 88341 IMHCHEM/IMCYTCHM EA ADD ANTB: CPT | Mod: TC | Performed by: DERMATOLOGY

## 2023-01-01 PROCEDURE — 11103 TANGNTL BX SKIN EA SEP/ADDL: CPT | Performed by: DERMATOLOGY

## 2023-01-01 PROCEDURE — 88305 TISSUE EXAM BY PATHOLOGIST: CPT | Mod: 26 | Performed by: PATHOLOGY

## 2023-01-01 PROCEDURE — 88342 IMHCHEM/IMCYTCHM 1ST ANTB: CPT | Mod: 26 | Performed by: PATHOLOGY

## 2023-01-01 PROCEDURE — 81342 TRG GENE REARRANGEMENT ANAL: CPT | Performed by: DERMATOLOGY

## 2023-01-01 PROCEDURE — 88312 SPECIAL STAINS GROUP 1: CPT | Mod: 26 | Performed by: PATHOLOGY

## 2023-01-01 PROCEDURE — 11104 PUNCH BX SKIN SINGLE LESION: CPT | Performed by: DERMATOLOGY

## 2023-01-01 PROCEDURE — 99441 PR PHYSICIAN TELEPHONE EVALUATION 5-10 MIN: CPT | Mod: 95 | Performed by: DERMATOLOGY

## 2023-01-01 PROCEDURE — G0452 MOLECULAR PATHOLOGY INTERPR: HCPCS | Mod: 26 | Performed by: STUDENT IN AN ORGANIZED HEALTH CARE EDUCATION/TRAINING PROGRAM

## 2023-01-01 PROCEDURE — 81342 TRG GENE REARRANGEMENT ANAL: CPT | Mod: 59 | Performed by: DERMATOLOGY

## 2023-01-01 PROCEDURE — 88341 IMHCHEM/IMCYTCHM EA ADD ANTB: CPT | Mod: 26 | Performed by: PATHOLOGY

## 2023-01-01 PROCEDURE — 11105 PUNCH BX SKIN EA SEP/ADDL: CPT | Performed by: DERMATOLOGY

## 2023-01-01 RX ORDER — HYDROXYZINE HYDROCHLORIDE 25 MG/1
25 TABLET, FILM COATED ORAL
Qty: 30 TABLET | Refills: 3 | Status: SHIPPED | OUTPATIENT
Start: 2023-01-01 | End: 2024-01-01

## 2023-01-01 RX ORDER — TACROLIMUS 1 MG/G
OINTMENT TOPICAL
Qty: 100 G | Refills: 11 | Status: SHIPPED | OUTPATIENT
Start: 2023-01-01 | End: 2024-01-01

## 2023-01-01 RX ORDER — DESONIDE 0.5 MG/G
OINTMENT TOPICAL
Qty: 60 G | Refills: 3 | Status: SHIPPED | OUTPATIENT
Start: 2023-01-01 | End: 2024-01-01

## 2023-01-01 RX ORDER — LIDOCAINE HYDROCHLORIDE AND EPINEPHRINE 10; 10 MG/ML; UG/ML
3 INJECTION, SOLUTION INFILTRATION; PERINEURAL ONCE
Status: DISCONTINUED | OUTPATIENT
Start: 2023-01-01 | End: 2024-01-01

## 2023-01-01 RX ORDER — FOLIC ACID 1 MG/1
1 TABLET ORAL DAILY
COMMUNITY
Start: 2023-01-01

## 2023-01-01 RX ORDER — FERROUS SULFATE 325(65) MG
325 TABLET ORAL
COMMUNITY
Start: 2023-01-01

## 2023-01-01 ASSESSMENT — PAIN SCALES - GENERAL
PAINLEVEL: MILD PAIN (2)
PAINLEVEL: NO PAIN (0)
PAINLEVEL: NO PAIN (0)

## 2023-01-06 NOTE — TELEPHONE ENCOUNTER
Spoke with rep [place] at [facility], no determination has been made yet. Should have out come 1/9

## 2023-01-10 NOTE — TELEPHONE ENCOUNTER
Free Drug Application Approved  Effective Dates: 12/31/23  Patient notified: yes- emailed pt  Additional Information: Rep Gamez confirmed patients approval for 2023

## 2023-01-12 ENCOUNTER — TELEPHONE (OUTPATIENT)
Dept: DERMATOLOGY | Facility: CLINIC | Age: 73
End: 2023-01-12

## 2023-01-12 DIAGNOSIS — L20.84 INTRINSIC ATOPIC DERMATITIS: ICD-10-CM

## 2023-01-12 NOTE — TELEPHONE ENCOUNTER
[facility] Call Center    Phone Message    May a detailed message be left on voicemail: no     Reason for Call: Medication Question or concern regarding medication   Prescription Clarification  Name of Medication: Tralokinumab-ldrm 150 MG/ML SOSY  Prescribing Provider: Jarad   Pharmacy: [facility] - [address] [place] [address]   What on the order needs clarification? Please send to above pharmacy           Action Taken: Message routed to:  [facility] DERM    Travel Screening: Not Applicable

## 2023-01-18 NOTE — ED NOTES
AVS reviewed with pt. Pt to follow up with PCP in the next week. Take medication as prescribed. Pt stated understanding. Pt left in stable condition.   
18-Jan-2023 14:25

## 2023-01-26 ENCOUNTER — TELEPHONE (OUTPATIENT)
Dept: ALLERGY | Facility: CLINIC | Age: 73
End: 2023-01-26
Payer: MEDICARE

## 2023-01-26 NOTE — TELEPHONE ENCOUNTER
STANDARD Series                                          # Substance 2 days 4 days remarks     1 Keith Mix [C] - -       2 Colophony - -       3  2-Mercaptobenzothiazole  - -       4 Methylisothiazolinone - -       5 Carba Mix - -       6 Thiuram Mix [A] - -       7 Bisphenol A Epoxy Resin - -       8 C-Kutx-Czqgqcnqoff-Formaldehyde Resin - -       9 Mercapto Mix [A] - -       10 Black Rubber Mix- PPD [B] - -       11 Potassium Dichromate  -  -       12 Balsam of [place] (Myroxylon Pereirae Resin) - -       13 Nickel Sulphate Hexahydrate - -       14 Mixed Dialkyl Thiourea - -       15 Paraben Mix [B] - -       16 Methyldibromo Glutaronitrile - -       17 Fragrance Mix 8% - -       18 2-Bromo-2-Nitropropane-1,3-Diol (Bronopol) CT - -       19 Lyral - -       20 Tixocortol-21- Pivalate CT - -       21 Diazolidinyl urea (Germall II) - -        22 Methyl Methacrylate - -       23 Cobalt (II) Chloride Hexahydrate - -       24 Fragrance Mix II  - -       25 Compositae Mix - -       26 Benzoyl Peroxide - -       27 Bacitracin - -       28 Formaldehyde - -       29 Methylchloroisothiazolinone / Methylisothiazolinone - -       30 Corticosteroid Mix CT - -       31 Sodium Lauryl Sulfate - -       32 Lanolin Alcohol - -       33 Turpentine - -       34 Cetylstearylalcohol - -       35 Chlorhexidine Dicluconate - -       36 Budesonide - -       37 Imidazolidinyl Urea  - -       38 Ethyl-2 Cyanoacrylate - -       39 Quaternium 15 (Dowicil 200) - -       40 Decyl Glucoside - -     PRESERVATIVES & ANTIMICROBIALS        # Substance 2 days 4 days remarks   41 1  1,2-Benzisothiazoline-3-One, Sodium Salt - -     2  1,3,5-Walter (2-Hydroxyethyl) - Hexahydrotriazine (Grotan BK) - -     3 7-Oxxdxmdqxitzw-7-Nitro-1, 3-Propanediol - -     4  3, 4, 4' - Triclocarban - -    45 5 4 - Chloro - 3 - Cresol - -     6 4 - Chloro - 4 - Xylenol (PCMX) - -     7 7-Ethylbicyclooxazolidine (Bioban [id number]) - -     8 Benzalkonium Chloride CT - -     9  Benzyl Alcohol - -    50 10 Cetalkonium Chloride - -     11 Cetylpyrimidine Chloride  - -     12 Chloroacetamide - -     13 DMDM Hydantoin - -     14 Glutaraldehyde - -    55 15 Triclosan - -     16 Glyoxal Trimeric Dihydrate - -     17 Iodopropynyl Butylcarbamate - -     18 Octylisothiazoline - -     19 Bithionol CT - -    60 20 Bioban P 1487 (Nitrobutyl) Morpholine/(Ethylnitro-Trimethylene) Dimorpholine - -     21 Phenoxyethanol - -     22 Phenyl Salicylate - -     23 Povidone Iodine - -     24 Sodium Benzoate - -    65 25 Sodium Disulfite - -     26 Sorbic Acid - -     27 Thimerosal - -     28 Melamine Formaldehyde Resin - -     29 Ethylenediamine Dihydrochloride - -      Parabens      70 30 Butyl-P-Hydroxybenzoate - -     31 Ethyl-P-Hydroxybenzoate - -     32 Methyl-P-Hydroxybenzoate - -    73 33 Propyl-P-Hydroxybenzoate - -    EMULSIFIERS & ADDITIVES       # Substance 2 days 4 days remarks   74 1 Polyethylene Glycol-400 - -    75 2 Cocamidopropyl Betaine - -     3 Amerchol L101 - -     4 Propylene Glycol - -     5 Triethanolamine - -     6 Sorbitane Sesquiolate CT - -    80 7 Isopropylmyristate - -     8 Polysorbate 80 CT - -     9 Amidoamine   (Stearamidopropyl Dimethylamine) - -     10 Oleamidopropyl Dimethylamine - -     11 Lauryl Glucoside - -    85 12 Coconut Diethanolamide  - -     13 2-Hydroxy-4-Methoxy Benzophenone (Oxybenzone) - -     14 Benzophenone-4 (Sulisobenzon) - -     15 Propolis - -     16 Dexpanthenol - -    90 17 Abitol - -     18 Tert-Butylhydroquinone - -     19 Benzyl Salicylate - -     20 Dimethylaminopropylamin (DMPA) - -     21 Zinc Pyrithione (Zinc Omadine)  - -    95 22 Walter(Hydroxymethyl) Nitromethane  - -      Antioxidant       23 Dodecyl Gallate - -     24 Butylhydroxyanisole (BHA) - -     25 Butylhydroxytoluene (BHT) - -     26 Di-Alpha-Tocopherol (Vit E) - -    100 27 Propyl Gallate - -    PERFUMES, FLAVORS & PLANTS        # Substance 2 days 4 days remarks   101 1 Benzyl  Cinnamate - -     2 Di-Limonene (Dipentene) - -     3 Cananga Odorata (Fannie Greco) (I) - -     4 Lichen Acid Mix - -    105 5 Mentha Piperita Oil (Peppermint Oil) - -     6 Sesquiterpenelactone mix - -     7 Tea Tree Oil, Oxidized - -     8 Wood Tar Mix - -     9 Abietic Acid - -    110 10 Lavendula Angustifolia Oil (Lavender Oil) - -     11 Fragrance mix II CT * 14% - -      Fragrance Mix I       12 Oakmoss Absolute - -     13 Eugenol - -     14 Geraniol - -    115 15 Hydroxycitronellal - -     16 Isoeugenol - -     17 Cinnamic Aldehyde - -     18 Cinnamic Alcohol  - -      Fragrance mix II       19 Citronellol - -    120 20 Alpha-Hexylcinnamic Aldehyde    - -     21 Citral - -     22 Farnesol - -    123 23 Coumarin - -      Hexylcinnamic aldehyde, Coumarin, Farnesol, Hydroxyisohexy3-cyclohexene carboxaldehyde, citral, citrolellol  CORTICOSTEROIDS   # Substance 2 days 4 days remarks Allergy  class   124 1 Amcinonide - -  B   125 2 Betametasone-17,21 Dipropionate - -  D1    3 Desoximetasone - -  C    4 Betamethasone-17-Valerate - -  D1    5 Dexamethasone - -  C    6 Hydrocortisone - -  A   130 7 Clobetasol-17-Propionate - -  D1    8 Dexamethasone-21-Phosphate Disodium Salt - -  C    9 Hydrocortisone-17 Butyrate - -  D2    10 Prednisolone - -  A    11 Triamcinolone Acetonide - -  B   135 12 Methylprednisolone Aceponate - -  D2    13 Hydrocortisone-21-Acetate - -  A   137 14 Prednicarbate - -  D2     Group Characteristics of group Generic name Name  cross reactions   A Hydrocortisone   Cloprednole, Fludrocortisone acétate, Hydrocortison acetate, Methylprednisolone, Prednisolone, Tixocortolpivalate Alfacortone, Fucidin H, Dermacalm, Hexacortone, Premandole, Imacort With group D2   B Triamcinolone-acetonide   Budenoside (R-isomer), Amcinonide, Desonide, Fluocinolone acetonide, Triamcinolone acetonide Locapred, Locatop  Synalar, Pevisone, Kenacort -   C Betamethasone (Without Azalea)   Betamethasone, Dexamethasone,  Flumethasone pivalate, Halomethasone Daivobet, Dexasalyl, Locasalen,   -   D1 Betamethasone-diproprionate   Betamethasone dipropionate, Betamethasone-17-valerate, Clobetasole-propionate, Fluticasone propionate, Mometasone furoate Betnovate, Diprogenta, Diprosalic, Diprosone, Celestoderm, Fucicort,  Cutivate, Axotide, Elocom -   D2 Methylprednisolone-aceponate   Hydrocortisone-aceponate, Hydrocortisone-buteprate, Hydrocortisone-17-butyrate, Methylprednisolone aceponate, Prednicarbate Locoïd, Advantan,  Prednitop With group A and Budesonide (S-isomer)     Results of patch tests:                         Interpretation:  - Negative                    A    = Allergic      (+) Erythema    TI   = Toxic/irritant   + E + Infiltration    RaP = Relevance at Present     ++ E/I + Papulovesicle   Rpr  = Relevance Previously     +++ E/I/P + Blister     nR   = No Relevance

## 2023-01-30 ENCOUNTER — OFFICE VISIT (OUTPATIENT)
Dept: ALLERGY | Facility: CLINIC | Age: 73
End: 2023-01-30
Payer: MEDICARE

## 2023-01-30 DIAGNOSIS — J30.89 SEASONAL AND PERENNIAL ALLERGIC RHINOCONJUNCTIVITIS: Primary | ICD-10-CM

## 2023-01-30 DIAGNOSIS — L20.89 OTHER ATOPIC DERMATITIS: ICD-10-CM

## 2023-01-30 DIAGNOSIS — J30.2 SEASONAL AND PERENNIAL ALLERGIC RHINOCONJUNCTIVITIS: Primary | ICD-10-CM

## 2023-01-30 DIAGNOSIS — H10.10 SEASONAL AND PERENNIAL ALLERGIC RHINOCONJUNCTIVITIS: Primary | ICD-10-CM

## 2023-01-30 DIAGNOSIS — L23.89 ALLERGIC CONTACT DERMATITIS DUE TO OTHER AGENTS: ICD-10-CM

## 2023-01-30 PROCEDURE — 95044 PATCH/APPLICATION TESTS: CPT | Mod: 59 | Performed by: DERMATOLOGY

## 2023-01-30 PROCEDURE — 95004 PERQ TESTS W/ALRGNC XTRCS: CPT | Performed by: DERMATOLOGY

## 2023-01-30 PROCEDURE — 95024 IQ TESTS W/ALLERGENIC XTRCS: CPT | Performed by: DERMATOLOGY

## 2023-01-30 NOTE — NURSING NOTE
Chief Complaint   Patient presents with     Allergy Testing Followup     Patch testing day 1       Vanessa Logan RN    
acutely inflammed appendix without perforation or gangrene

## 2023-01-30 NOTE — LETTER
1/30/2023         RE: Junaid Cormier  [address] [place] [address]        Dear Colleague,    Thank you for referring your patient, Junaid Cormier, to the [facility] [place]. Please see a copy of my visit note below.    [facility] Dermato-allergology Note  Office visit  Encounter Date: Jan 30, 2023  ____________________________________________    CC: No chief complaint on file.      HPI:  (Jan 30, 2023)  Mr. Junaid Cormier is a(n) 72 year old male who presents today as a return patient for allergy tests as planned  - Follow-up in Derm-Allergy clinic for prick and patch tests as planned (stop antihistamines 1 week prior)  - Had the 2nd Adbry injection last Friday.   - otherwise feeling well in usual state of health    Physical exam:  General: In no acute distress, well-developed, well-nourished  Eyes: no conjunctivitis  ENT: no signs of rhinitis   Pulmonary: no wheezing or coughing  Skin: Focused examination of the skin on test sites was performed = see test results below  No active eczematous skin lesions on tests sites, particularly back  Skin on arms and legs much better now and no scratch marks    Earlier History and Allergy exams:  (Nov 29, 2022)  - patient developed more than 2 years ago a generalized eczematous rash and Dr. Virgen put the patient on Dupixent September 2021 and it worked perfectly until a stent operation in March/Mai 2022 --> since then even on Dupixent recurrent flare ups    Skin: skin still very dry and on then hands very lichenified and still dermatitis even on Dupixent. Only about 70% cleared up and as well itching sometimes recurrent    Past Medical History:   Patient Active Problem List   Diagnosis     Intrinsic atopic dermatitis     No past medical history on file.    Allergies:  No Known Allergies    Medications:  Current Outpatient Medications   Medication     aspirin (ASA) 81 MG EC tablet     atorvastatin  (LIPITOR) 80 MG tablet     augmented betamethasone dipropionate (DIPROLENE) 0.05 % external lotion     augmented betamethasone dipropionate (DIPROLENE-AF) 0.05 % external ointment     cetirizine HCl 10 MG CAPS     desonide (DESOWEN) 0.05 % external ointment     dupilumab (DUPIXENT) 300 MG/2ML prefilled pen     dupilumab (DUPIXENT) 300 MG/2ML prefilled pen     fexofenadine (ALLEGRA) 180 MG tablet     hydrOXYzine (ATARAX) 25 MG tablet     hydrOXYzine (ATARAX) 25 MG tablet     lisinopril (ZESTRIL) 20 MG tablet     tacrolimus (PROTOPIC) 0.1 % external ointment     tamsulosin (FLOMAX) 0.4 MG capsule     Tralokinumab-ldrm 150 MG/ML SOSY     urea (GORMEL) 20 % external cream     No current facility-administered medications for this visit.       Social History:  The patient is retired. Patient has the following hobbies or non-occupational exposure: golf, ski    Family History:  No family history on file.    Previous Labs, Allergy Tests, Dermatopathology, Imaging:  See files of Dr. Virgen    Referred By: Gilberto Virgen MD  [address] [place] [id number]     Allergy Tests:    Past Allergy Test    Order for Future Allergy Testing:    [x] Outpatient  [] Inpatient: [facility]..../ Bed ....       Skin Atopy (atopic dermatitis) [] Yes   [x] No ..but brother had aD as child  Contact allergies:   [x] Yes   [] No ..Deodorant   Hand eczema:   [x] Yes   [] No           Leading hand:   [] R   [] L       [] Ambidextrous         Drug allergies:        [] Yes   [x] No  which?......    Urticaria/Angioedema  [] Yes   [x] No .........  Food Allergy:  [] Yes   [x] No  which?......  Pets :  [] Yes   [x] No  Which?...never problems with pets        [x]  Rhinitis   [] Conjunctivitis   [] Sinusitis   [] Polyposis   [] Otitis   [] Pharyngitis         []  Postnasal drip    [x]  none recently constantly runny nose  Operations:   [] Tonsils   [] Septum   [] Sinus   [] Polyposis        [] Asthma bronchiale   [] Coughing      [x]  none  Symptoms  (mostly Rhinoconjunctivitis and Asthma) aggravated by:  Season   [] I   [] II   [] III   [] IV   []V   []VI   []VII   []VIII   []IX   []X   []XI   []XII     [] perennial   Day time      [] morning   [] noon      [] evening        [] night    [] whole day........  []  none  Location/changes    [] inside        [] outside   [] mountains    [] sea     [] others.............   []  none  Triggers, specific     [] animals     [] plants     [] dust              [] others ...........................    []  none  Triggers, others       [] work          [] psyche    [] sport            [] others .............................  []  none  Irritant                [] phys efforts [] smoke    [] heat/cold     [] odors  []others............... []  none    Order for PATCH TESTS  Reason for tests (suspected allergy): to rule out additional contact sensitization  Known previous allergies: none  Standardized panels  [x] Standard panel (40 tests)  [x] Preservatives & Antimicrobials (31 tests)  [x] Emulsifiers & Additives (25 tests)   [x] Perfumes/Flavours & Plants (25 tests)  [] Hairdresser panel (12 tests)  [] Rubber Chemicals (22 tests)  [] Plastics (26 tests)  [] Colorants/Dyes/Food additives (20 tests)  [] Metals (implants/dental) (24 tests)  [] Local anaesthetics/NSAIDs (13 tests)  [] Antibiotics & Antimycotics (14 tests)   [x] Corticosteroids (15 tests)   [] Photopatch test (62 tests)   [] others: ...      [] Patient's own products: ...    DO NOT test if chemical or biological identity is unknown!     always ask from patient the product information and safety sheets (MSDS)       Order for PRICK TESTS    Reason for tests (suspected allergy): atopy?  Known previous allergies: none    Standardized prick panels  [x] Atopic panel (20 tests)  [] Pediatric Panel (12 tests)  [] Milk, Meat, Eggs, Grains (20 tests)   [] Dust, Epithelia, Feathers (10 tests)  [] Fish, Seafood, Shellfish (17 tests)  [] Nuts, Beans (14 tests)  [] Spice,  Vegetable, Fruit (17 tests)  [] Pollen Panel = Tree, Grass, Weed (24 tests)  [] Others: ...      [] Patient's own products: ...    DO NOT test if chemical or biological identity is unknown!     always ask from patient the product information and safety sheets (MSDS)     Standardized intradermal tests  [x] Penicillium notatum [x] Aspergillus fumigatus [x] House dust mites D.far & D. pteron  [] Cat    [] dog  [] Others: ...  [] Bee venom   [] Wasp venom  !!Specific protocol with dilutions!!       Order for Drug allergy tests (prick & Intradermal & patch tests)    [] Penicillin G  [] Ampicillin [] Cefazolin   [] Ceftriaxone   [] Ceftazidime  [] Bactrim    [] Others: ...  Order for ... as test date    ________________________________  RESULTS & EVALUATION of PATCH TESTS    Jan 30, 2023 application of patch tests:    STANDARD Series                                          # Substance 2 days 4 days remarks     1 Keith Mix [C] - -       2 Colophony - -       3  2-Mercaptobenzothiazole  - -       4 Methylisothiazolinone - -       5 Carba Mix - -       6 Thiuram Mix [A] - -       7 Bisphenol A Epoxy Resin - -       8 Q-Hnrn-Cailzqrclie-Formaldehyde Resin - -       9 Mercapto Mix [A] - -       10 Black Rubber Mix- PPD [B] - -       11 Potassium Dichromate  -  -       12 Balsam of [place] (Myroxylon Pereirae Resin) - -       13 Nickel Sulphate Hexahydrate - -       14 Mixed Dialkyl Thiourea - -       15 Paraben Mix [B] - -       16 Methyldibromo Glutaronitrile - -       17 Fragrance Mix 8% - -       18 2-Bromo-2-Nitropropane-1,3-Diol (Bronopol) CT N/A N/A       19 Lyral - -       20 Tixocortol-21- Pivalate CT - -       21 Diazolidinyl urea (Germall II) - -        22 Methyl Methacrylate - -       23 Cobalt (II) Chloride Hexahydrate - -       24 Fragrance Mix II  - -       25 Compositae Mix - -       26 Benzoyl Peroxide - -       27 Bacitracin - -       28 Formaldehyde - -       29 Methylchloroisothiazolinone /  Methylisothiazolinone - -       30 Corticosteroid Mix CT - -       31 Sodium Lauryl Sulfate - -       32 Lanolin Alcohol - -       33 Turpentine - -       34 Cetylstearylalcohol - -       35 Chlorhexidine Dicluconate - -       36 Budesonide - -       37 Imidazolidinyl Urea  - -       38 Ethyl-2 Cyanoacrylate - -       39 Quaternium 15 (Dowicil 200) - -       40 Decyl Glucoside - -     PRESERVATIVES & ANTIMICROBIALS        # Substance 2 days 4 days remarks   41 1  1,2-Benzisothiazoline-3-One, Sodium Salt - -     2  1,3,5-Walter (2-Hydroxyethyl) - Hexahydrotriazine (Grotan BK) - -     3 7-Ycxjmegjxzbcq-8-Nitro-1, 3-Propanediol - -     4  3, 4, 4' - Triclocarban - -    45 5 4 - Chloro - 3 - Cresol - -     6 4 - Chloro - 4 - Xylenol (PCMX) - -     7 7-Ethylbicyclooxazolidine (Bioban [id number]) - -     8 Benzalkonium Chloride CT - -     9 Benzyl Alcohol - -    50 10 Cetalkonium Chloride - -     11 Cetylpyrimidine Chloride  - -     12 Chloroacetamide - -     13 DMDM Hydantoin - -     14 Glutaraldehyde - -    55 15 Triclosan - -     16 Glyoxal Trimeric Dihydrate - -     17 Iodopropynyl Butylcarbamate - -     18 Octylisothiazoline - -     19 Bithionol CT - -    60 20 Bioban P 1487 (Nitrobutyl) Morpholine/(Ethylnitro-Trimethylene) Dimorpholine - -     21 Phenoxyethanol - -     22 Phenyl Salicylate - -     23 Povidone Iodine - -     24 Sodium Benzoate - -    65 25 Sodium Disulfite - -     26 Sorbic Acid - -     27 Thimerosal - -     28 Melamine Formaldehyde Resin - -     29 Ethylenediamine Dihydrochloride - -      Parabens      70 30 Butyl-P-Hydroxybenzoate - -     31 Ethyl-P-Hydroxybenzoate N/A N/A     32 Methyl-P-Hydroxybenzoate - -    73 33 Propyl-P-Hydroxybenzoate - -    EMULSIFIERS & ADDITIVES       # Substance 2 days 4 days remarks   74 1 Polyethylene Glycol-400 - -    75 2 Cocamidopropyl Betaine - -     3 Amerchol L101 - -     4 Propylene Glycol - -     5 Triethanolamine - -     6 Sorbitane Sesquiolate CT - -    80 7  Isopropylmyristate - -     8 Polysorbate 80 CT - -     9 Amidoamine   (Stearamidopropyl Dimethylamine) - -     10 Oleamidopropyl Dimethylamine - -     11 Lauryl Glucoside - -    85 12 Coconut Diethanolamide  - -     13 2-Hydroxy-4-Methoxy Benzophenone (Oxybenzone) - -     14 Benzophenone-4 (Sulisobenzon) - -     15 Propolis - -     16 Dexpanthenol - -    90 17 Abitol - -     18 Tert-Butylhydroquinone - -     19 Benzyl Salicylate - -     20 Dimethylaminopropylamin (DMPA) - -     21 Zinc Pyrithione (Zinc Omadine)  - -    95 22 Walter(Hydroxymethyl) Nitromethane  - -      Antioxidant       23 Dodecyl Gallate - -     24 Butylhydroxyanisole (BHA) - -     25 Butylhydroxytoluene (BHT) - -     26 Di-Alpha-Tocopherol (Vit E) - -    100 27 Propyl Gallate - -    PERFUMES, FLAVORS & PLANTS        # Substance 2 days 4 days remarks   101 1 Benzyl Cinnamate - -     2 Di-Limonene (Dipentene) - -     3 Cananga Odorata (Fannie Greco) (I) - -     4 Lichen Acid Mix - -    105 5 Mentha Piperita Oil (Peppermint Oil) - -     6 Sesquiterpenelactone mix - -     7 Tea Tree Oil, Oxidized - -     8 Wood Tar Mix - -     9 Abietic Acid N/A N/A    110 10 Lavendula Angustifolia Oil (Lavender Oil) - -     11 Fragrance mix II CT * 14% N/A N/A      Fragrance Mix I       12 Oakmoss Absolute - -     13 Eugenol - -     14 Geraniol - -    115 15 Hydroxycitronellal - -     16 Isoeugenol - -     17 Cinnamic Aldehyde - -     18 Cinnamic Alcohol  - -      Fragrance mix II       19 Citronellol - -    120 20 Alpha-Hexylcinnamic Aldehyde    - -     21 Citral - -     22 Farnesol - -    123 23 Coumarin - -      Hexylcinnamic aldehyde, Coumarin, Farnesol, Hydroxyisohexy3-cyclohexene carboxaldehyde, citral, citrolellol  CORTICOSTEROIDS   # Substance 2 days 4 days remarks Allergy  class   124 1 Amcinonide - -  B   125 2 Betametasone-17,21 Dipropionate - -  D1    3 Desoximetasone - -  C    4 Betamethasone-17-Valerate - -  D1    5 Dexamethasone - -  C    6 Hydrocortisone  - -  A   130 7 Clobetasol-17-Propionate - -  D1    8 Dexamethasone-21-Phosphate Disodium Salt - -  C    9 Hydrocortisone-17 Butyrate - -  D2    10 Prednisolone - -  A    11 Triamcinolone Acetonide - -  B   135 12 Methylprednisolone Aceponate - -  D2    13 Hydrocortisone-21-Acetate - -  A   137 14 Prednicarbate N/A N/A  D2     Group Characteristics of group Generic name Name  cross reactions   A Hydrocortisone   Cloprednole, Fludrocortisone acétate, Hydrocortison acetate, Methylprednisolone, Prednisolone, Tixocortolpivalate Alfacortone, Fucidin H, Dermacalm, Hexacortone, Premandole, Imacort With group D2   B Triamcinolone-acetonide   Budenoside (R-isomer), Amcinonide, Desonide, Fluocinolone acetonide, Triamcinolone acetonide Locapred, Locatop  Synalar, Pevisone, Kenacort -   C Betamethasone (Without Azalea)   Betamethasone, Dexamethasone, Flumethasone pivalate, Halomethasone Daivobet, Dexasalyl, Locasalen,   -   D1 Betamethasone-diproprionate   Betamethasone dipropionate, Betamethasone-17-valerate, Clobetasole-propionate, Fluticasone propionate, Mometasone furoate Betnovate, Diprogenta, Diprosalic, Diprosone, Celestoderm, Fucicort,  Cutivate, Axotide, Elocom -   D2 Methylprednisolone-aceponate   Hydrocortisone-aceponate, Hydrocortisone-buteprate, Hydrocortisone-17-butyrate, Methylprednisolone aceponate, Prednicarbate Locoïd, Advantan,  Prednitop With group A and Budesonide (S-isomer)     Results of patch tests:                         Interpretation:  - Negative                    A    = Allergic      (+) Erythema    TI   = Toxic/irritant   + E + Infiltration    RaP = Relevance at Present     ++ E/I + Papulovesicle   Rpr  = Relevance Previously     +++ E/I/P + Blister     nR   = No Relevance      Atopy Screen (Placed Jan 30, 2023)  No Substance Readings (15 min) Evaluation   POS Histamine 1mg/ml -    NEG NaCl 0.9% -      No Substance Readings (15 min) Evaluation   1 Alternaria alternata (tenuis)  -    2 Cladosporium  herbarum -    3 Aspergillus fumigatus -    4 Penicillium notatum -    5 Dermatophagoides pteronyssinus -    6 Dermatophagoides farinae -    7 Dog epithelium (canis spp) -    8 Cat hair (jyothi catus) -    9 Cockroach   (Blatella americana & germanica) -    10 Grass mix midwest   (Maida, Orchard, Redtop, Garrison) -    11 Juan grass (sorghum halepense) -    12 Weed mix   (common Cocklebur, Lamb s quarters, rough redroot Pigweed, Dock/Sorrel) -    13 Mug wort (artemisia vulgare) -    14 Ragweed giant/short (ambrosia spp) -    15 White birch (Betula papyrifera) -    16 Tree mix 1 (Pecan, Maple BHR, Oak RVW, american Lockney, black Whiteoak) -    17 Red cedar (juniperus virginia) -    18 Tree mix 2   (white Javad, river/red Birch, black Rincon, common Bledsoe, american Elm) -    19 Box elder/Maple mix (acer spp) -    20 Arapaho shagbark (carya ovata) -           Conclusion      Intradermal Testing (Placed Jan 30, 2023)  No Substance Conc.  Reading (15min)  immediate Papule [mm] / Erythema [mm] Reading   (... days)  delayed Papule [mm] / Erythema [mm] Remarks   DF Standard Dust Mite - D. Farinae 1:10 + 7/7  -    DP Standard Dust Mite - D. Pteronyssinus 1:10 + 6/6  -    A Aspergillus fumigatus  1:10 -   -    P Penicillium notatum 1:10 -   -    Conclusions: immediate type reaction to dust mites. Will see if delayed type reaction     [] No relevant allergic reaction observed    [] Allergic reaction diagnosed against following allergens:      Interpretation/ remarks:   See later    [] Patient information given   [] ACDS CAMP information's (# ....) to following compounds: .....   [] General information's to following compounds: ......      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 I would be happy to respond to any concerns or questions you may have.        Procedures Performed: Allergy tests, including prick, intradermal and patch tests    Staff: : provider    Follow-up in Derm-Allergy clinic for 1st readings of patch tests after 2 days and 2nd readings and final conclusions after 4 days   ___________________________    I spent a total of 38 minutes with Junaid Cormier during today s  visit. This time was spent discussing all the individual test results, correlating them to the clinical relevance, counseling the patient and/or coordinating care and performing allergy tests             Again, thank you for allowing me to participate in the care of your patient.        Sincerely,        Quincy Abraham MD

## 2023-01-30 NOTE — PROGRESS NOTES
Formerly [facility] Dermato-allergology Note  Office visit  Encounter Date: Jan 30, 2023  ____________________________________________    CC: No chief complaint on file.      HPI:  (Jan 30, 2023)  Mr. Junaid Cormier is a(n) 72 year old male who presents today as a return patient for allergy tests as planned  - Follow-up in Derm-Allergy clinic for prick and patch tests as planned (stop antihistamines 1 week prior)  - Had the 2nd Adbry injection last Friday.   - otherwise feeling well in usual state of health    Physical exam:  General: In no acute distress, well-developed, well-nourished  Eyes: no conjunctivitis  ENT: no signs of rhinitis   Pulmonary: no wheezing or coughing  Skin: Focused examination of the skin on test sites was performed = see test results below  No active eczematous skin lesions on tests sites, particularly back  Skin on arms and legs much better now and no scratch marks    Earlier History and Allergy exams:  (Nov 29, 2022)  - patient developed more than 2 years ago a generalized eczematous rash and Dr. Virgen put the patient on Dupixent September 2021 and it worked perfectly until a stent operation in March/Mai 2022 --> since then even on Dupixent recurrent flare ups    Skin: skin still very dry and on then hands very lichenified and still dermatitis even on Dupixent. Only about 70% cleared up and as well itching sometimes recurrent    Past Medical History:   Patient Active Problem List   Diagnosis     Intrinsic atopic dermatitis     No past medical history on file.    Allergies:  No Known Allergies    Medications:  Current Outpatient Medications   Medication     aspirin (ASA) 81 MG EC tablet     atorvastatin (LIPITOR) 80 MG tablet     augmented betamethasone dipropionate (DIPROLENE) 0.05 % external lotion     augmented betamethasone dipropionate (DIPROLENE-AF) 0.05 % external ointment     cetirizine HCl 10 MG CAPS     desonide (DESOWEN) 0.05 % external ointment     dupilumab  (DUPIXENT) 300 MG/2ML prefilled pen     dupilumab (DUPIXENT) 300 MG/2ML prefilled pen     fexofenadine (ALLEGRA) 180 MG tablet     hydrOXYzine (ATARAX) 25 MG tablet     hydrOXYzine (ATARAX) 25 MG tablet     lisinopril (ZESTRIL) 20 MG tablet     tacrolimus (PROTOPIC) 0.1 % external ointment     tamsulosin (FLOMAX) 0.4 MG capsule     Tralokinumab-ldrm 150 MG/ML SOSY     urea (GORMEL) 20 % external cream     No current facility-administered medications for this visit.       Social History:  The patient is retired. Patient has the following hobbies or non-occupational exposure: golf, ski    Family History:  No family history on file.    Previous Labs, Allergy Tests, Dermatopathology, Imaging:  See files of Dr. Vigren    Referred By: Gilberto Virgen MD  [address] [place] [id number]     Allergy Tests:    Past Allergy Test    Order for Future Allergy Testing:    [x] Outpatient  [] Inpatient: [facility]..../ Bed ....       Skin Atopy (atopic dermatitis) [] Yes   [x] No ..but brother had aD as child  Contact allergies:   [x] Yes   [] No ..Deodorant   Hand eczema:   [x] Yes   [] No           Leading hand:   [] R   [] L       [] Ambidextrous         Drug allergies:        [] Yes   [x] No  which?......    Urticaria/Angioedema  [] Yes   [x] No .........  Food Allergy:  [] Yes   [x] No  which?......  Pets :  [] Yes   [x] No  Which?...never problems with pets        [x]  Rhinitis   [] Conjunctivitis   [] Sinusitis   [] Polyposis   [] Otitis   [] Pharyngitis         []  Postnasal drip    [x]  none recently constantly runny nose  Operations:   [] Tonsils   [] Septum   [] Sinus   [] Polyposis        [] Asthma bronchiale   [] Coughing      [x]  none  Symptoms (mostly Rhinoconjunctivitis and Asthma) aggravated by:  Season   [] I   [] II   [] III   [] IV   []V   []VI   []VII   []VIII   []IX   []X   []XI   []XII     [] perennial   Day time      [] morning   [] noon      [] evening        [] night    [] whole day........  []   none  Location/changes    [] inside        [] outside   [] mountains    [] sea     [] others.............   []  none  Triggers, specific     [] animals     [] plants     [] dust              [] others ...........................    []  none  Triggers, others       [] work          [] psyche    [] sport            [] others .............................  []  none  Irritant                [] phys efforts [] smoke    [] heat/cold     [] odors  []others............... []  none    Order for PATCH TESTS  Reason for tests (suspected allergy): to rule out additional contact sensitization  Known previous allergies: none  Standardized panels  [x] Standard panel (40 tests)  [x] Preservatives & Antimicrobials (31 tests)  [x] Emulsifiers & Additives (25 tests)   [x] Perfumes/Flavours & Plants (25 tests)  [] Hairdresser panel (12 tests)  [] Rubber Chemicals (22 tests)  [] Plastics (26 tests)  [] Colorants/Dyes/Food additives (20 tests)  [] Metals (implants/dental) (24 tests)  [] Local anaesthetics/NSAIDs (13 tests)  [] Antibiotics & Antimycotics (14 tests)   [x] Corticosteroids (15 tests)   [] Photopatch test (62 tests)   [] others: ...      [] Patient's own products: ...    DO NOT test if chemical or biological identity is unknown!     always ask from patient the product information and safety sheets (MSDS)       Order for PRICK TESTS    Reason for tests (suspected allergy): atopy?  Known previous allergies: none    Standardized prick panels  [x] Atopic panel (20 tests)  [] Pediatric Panel (12 tests)  [] Milk, Meat, Eggs, Grains (20 tests)   [] Dust, Epithelia, Feathers (10 tests)  [] Fish, Seafood, Shellfish (17 tests)  [] Nuts, Beans (14 tests)  [] Spice, Vegetable, Fruit (17 tests)  [] Pollen Panel = Tree, Grass, Weed (24 tests)  [] Others: ...      [] Patient's own products: ...    DO NOT test if chemical or biological identity is unknown!     always ask from patient the product information and safety sheets (MSDS)      Standardized intradermal tests  [x] Penicillium notatum [x] Aspergillus fumigatus [x] House dust mites D.far & D. pteron  [] Cat    [] dog  [] Others: ...  [] Bee venom   [] Wasp venom  !!Specific protocol with dilutions!!       Order for Drug allergy tests (prick & Intradermal & patch tests)    [] Penicillin G  [] Ampicillin [] Cefazolin   [] Ceftriaxone   [] Ceftazidime  [] Bactrim    [] Others: ...  Order for ... as test date    ________________________________  RESULTS & EVALUATION of PATCH TESTS    Jan 30, 2023 application of patch tests:    STANDARD Series                                          # Substance 2 days 4 days remarks     1 Keith Mix [C] - -       2 Colophony - -       3  2-Mercaptobenzothiazole  - -       4 Methylisothiazolinone - -       5 Carba Mix - -       6 Thiuram Mix [A] - -       7 Bisphenol A Epoxy Resin - -       8 Z-Spmf-Auwgafkicyt-Formaldehyde Resin - -       9 Mercapto Mix [A] - -       10 Black Rubber Mix- PPD [B] - -       11 Potassium Dichromate  -  -       12 Balsam of [place] (Myroxylon Pereirae Resin) - -       13 Nickel Sulphate Hexahydrate - -       14 Mixed Dialkyl Thiourea - -       15 Paraben Mix [B] - -       16 Methyldibromo Glutaronitrile - -       17 Fragrance Mix 8% - -       18 2-Bromo-2-Nitropropane-1,3-Diol (Bronopol) CT N/A N/A       19 Lyral - -       20 Tixocortol-21- Pivalate CT - -       21 Diazolidinyl urea (Germall II) - -        22 Methyl Methacrylate - -       23 Cobalt (II) Chloride Hexahydrate - -       24 Fragrance Mix II  - -       25 Compositae Mix - -       26 Benzoyl Peroxide - -       27 Bacitracin - -       28 Formaldehyde - -       29 Methylchloroisothiazolinone / Methylisothiazolinone - -       30 Corticosteroid Mix CT - -       31 Sodium Lauryl Sulfate - -       32 Lanolin Alcohol - -       33 Turpentine - -       34 Cetylstearylalcohol - -       35 Chlorhexidine Dicluconate - -       36 Budesonide - -       37 Imidazolidinyl Urea  - -        38 Ethyl-2 Cyanoacrylate - -       39 Quaternium 15 (Dowicil 200) - -       40 Decyl Glucoside - -     PRESERVATIVES & ANTIMICROBIALS        # Substance 2 days 4 days remarks   41 1  1,2-Benzisothiazoline-3-One, Sodium Salt - -     2  1,3,5-Walter (2-Hydroxyethyl) - Hexahydrotriazine (Grotan BK) - -     3 0-Yxxodbabytiju-3-Nitro-1, 3-Propanediol - -     4  3, 4, 4' - Triclocarban - -    45 5 4 - Chloro - 3 - Cresol - -     6 4 - Chloro - 4 - Xylenol (PCMX) - -     7 7-Ethylbicyclooxazolidine (Bioban LR4806) - -     8 Benzalkonium Chloride CT - -     9 Benzyl Alcohol - -    50 10 Cetalkonium Chloride - -     11 Cetylpyrimidine Chloride  - -     12 Chloroacetamide - -     13 DMDM Hydantoin - -     14 Glutaraldehyde - -    55 15 Triclosan - -     16 Glyoxal Trimeric Dihydrate - -     17 Iodopropynyl Butylcarbamate - -     18 Octylisothiazoline - -     19 Bithionol CT - -    60 20 Bioban P 1487 (Nitrobutyl) Morpholine/(Ethylnitro-Trimethylene) Dimorpholine - -     21 Phenoxyethanol - -     22 Phenyl Salicylate - -     23 Povidone Iodine - -     24 Sodium Benzoate - -    65 25 Sodium Disulfite - -     26 Sorbic Acid - -     27 Thimerosal - -     28 Melamine Formaldehyde Resin - -     29 Ethylenediamine Dihydrochloride - -      Parabens      70 30 Butyl-P-Hydroxybenzoate - -     31 Ethyl-P-Hydroxybenzoate N/A N/A     32 Methyl-P-Hydroxybenzoate - -    73 33 Propyl-P-Hydroxybenzoate - -    EMULSIFIERS & ADDITIVES       # Substance 2 days 4 days remarks   74 1 Polyethylene Glycol-400 - -    75 2 Cocamidopropyl Betaine - -     3 Amerchol L101 - -     4 Propylene Glycol - -     5 Triethanolamine - -     6 Sorbitane Sesquiolate CT - -    80 7 Isopropylmyristate - -     8 Polysorbate 80 CT - -     9 Amidoamine   (Stearamidopropyl Dimethylamine) - -     10 Oleamidopropyl Dimethylamine - -     11 Lauryl Glucoside - -    85 12 Coconut Diethanolamide  - -     13 2-Hydroxy-4-Methoxy Benzophenone (Oxybenzone) - -     14 Benzophenone-4  (Sulisobenzon) - -     15 Propolis - -     16 Dexpanthenol - -    90 17 Abitol - -     18 Tert-Butylhydroquinone - -     19 Benzyl Salicylate - -     20 Dimethylaminopropylamin (DMPA) - -     21 Zinc Pyrithione (Zinc Omadine)  - -    95 22 Walter(Hydroxymethyl) Nitromethane  - -      Antioxidant       23 Dodecyl Gallate - -     24 Butylhydroxyanisole (BHA) - -     25 Butylhydroxytoluene (BHT) - -     26 Di-Alpha-Tocopherol (Vit E) - -    100 27 Propyl Gallate - -    PERFUMES, FLAVORS & PLANTS        # Substance 2 days 4 days remarks   101 1 Benzyl Cinnamate - -     2 Di-Limonene (Dipentene) - -     3 [place]anga Odorata (Fannie Greco) (I) - -     4 Lichen Acid Mix - -    105 5 Mentha Piperita Oil (Peppermint Oil) - -     6 Sesquiterpenelactone mix - -     7 Tea Tree Oil, Oxidized - -     8 Wood Tar Mix - -     9 Abietic Acid N/A N/A    110 10 Lavendula Angustifolia Oil (Lavender Oil) - -     11 Fragrance mix II CT * 14% N/A N/A      Fragrance Mix I       12 [place] Absolute - -     13 Eugenol - -     14 Geraniol - -    115 15 Hydroxycitronellal - -     16 Isoeugenol - -     17 Cinnamic Aldehyde - -     18 Cinnamic Alcohol  - -      Fragrance mix II       19 Citronellol - -    120 20 Alpha-Hexylcinnamic Aldehyde    - -     21 Citral - -     22 Farnesol - -    123 23 Coumarin - -      Hexylcinnamic aldehyde, Coumarin, Farnesol, Hydroxyisohexy3-cyclohexene carboxaldehyde, citral, citrolellol  CORTICOSTEROIDS   # Substance 2 days 4 days remarks Allergy  class   124 1 Amcinonide - -  B   125 2 Betametasone-17,21 Dipropionate - -  D1    3 Desoximetasone - -  C    4 Betamethasone-17-Valerate - -  D1    5 Dexamethasone - -  C    6 Hydrocortisone - -  A   130 7 Clobetasol-17-Propionate - -  D1    8 Dexamethasone-21-Phosphate Disodium Salt - -  C    9 Hydrocortisone-17 Butyrate - -  D2    10 Prednisolone - -  A    11 Triamcinolone Acetonide - -  B   135 12 Methylprednisolone Aceponate - -  D2    13 Hydrocortisone-21-Acetate - -  A    137 14 Prednicarbate N/A N/A  D2     Group Characteristics of group Generic name Name  cross reactions   A Hydrocortisone   Cloprednole, Fludrocortisone acétate, Hydrocortison acetate, Methylprednisolone, Prednisolone, Tixocortolpivalate Alfacortone, Fucidin H, Dermacalm, Hexacortone, Premandole, Imacort With group D2   B Triamcinolone-acetonide   Budenoside (R-isomer), Amcinonide, Desonide, Fluocinolone acetonide, Triamcinolone acetonide Locapred, Locatop  Synalar, Pevisone, Ken[place]ort -   C Betamethasone (Without Azalea)   Betamethasone, Dexamethasone, Flumethasone pivalate, Halomethasone Daivobet, Dexasalyl, Locasalen,   -   D1 Betamethasone-diproprionate   Betamethasone dipropionate, Betamethasone-17-valerate, Clobetasole-propionate, Fluticasone propionate, Mometasone furoate Betnovate, Diprogenta, Diprosalic, Diprosone, Celestoderm, Fucicort,  Cutivate, Axotide, Elocom -   D2 Methylprednisolone-aceponate   Hydrocortisone-aceponate, Hydrocortisone-buteprate, Hydrocortisone-17-butyrate, Methylprednisolone aceponate, Prednicarbate Locoïd, Advantan,  Prednitop With group A and Budesonide (S-isomer)     Results of patch tests:                         Interpretation:  - Negative                    A    = Allergic      (+) Erythema    TI   = Toxic/irritant   + E + Infiltration    RaP = Relevance at Present     ++ E/I + Papulovesicle   Rpr  = Relevance Previously     +++ E/I/P + Blister     nR   = No Relevance      Atopy Screen (Placed Jan 30, 2023)  No Substance Readings (15 min) Evaluation   POS Histamine 1mg/ml -    NEG NaCl 0.9% -      No Substance Readings (15 min) Evaluation   1 Alternaria alternata (tenuis)  -    2 Cladosporium herbarum -    3 Aspergillus fumigatus -    4 Penicillium notatum -    5 Dermatophagoides pteronyssinus -    6 Dermatophagoides farinae -    7 Dog epithelium (canis spp) -    8 Cat hair (jyothi catus) -    9 Cockroach   (Blatella americana & germanica) -    10 Grass mix Mica   (June,  Orchard, Redtop, Garrison) -    11 Juan grass (sorghum halepense) -    12 Weed mix   (common Cocklebur, Lamb s quarters, rough redroot Pigweed, Dock/Sorrel) -    13 Mug wort (artemisia vulgare) -    14 Ragweed giant/short (ambrosia spp) -    15 White birch (Betula papyrifera) -    16 Tree mix 1 (Pecan, Maple BHR, Oak RVW, american [place], black [place]) -    17 Red cedar (juniperus virginia) -    18 Tree mix 2   (white Javad, river/red Birch, black Fossil, common [place], american Elm) -    19 Box elder/Maple mix (acer spp) -    20 [place] shagbark (carya ovata) -           Conclusion      Intradermal Testing (Placed Jan 30, 2023)  No Substance Conc.  Reading (15min)  immediate Papule [mm] / Erythema [mm] Reading   (... days)  delayed Papule [mm] / Erythema [mm] Remarks   DF Standard Dust Mite - D. Farinae 1:10 + 7/7  -    DP Standard Dust Mite - D. Pteronyssinus 1:10 + 6/6  -    A Aspergillus fumigatus  1:10 -   -    P Penicillium notatum 1:10 -   -    Conclusions: immediate type reaction to dust mites. Will see if delayed type reaction     [] No relevant allergic reaction observed    [] Allergic reaction diagnosed against following allergens:      Interpretation/ remarks:   See later    [] Patient information given   [] ACDS CAMP information's (# ....) to following compounds: .....   [] General information's to following compounds: ......      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 I would be happy to respond to any concerns or questions you may have.        Procedures Performed: Allergy tests, including prick, intradermal and patch tests    Staff: : provider    Follow-up in Derm-Allergy clinic for 1st readings of patch tests after 2 days and 2nd readings and final conclusions after 4 days   ___________________________    I spent a total of 38 minutes with Junaid Cormier during today s  visit. This time was spent discussing all the individual test results, correlating them to the clinical relevance, counseling the patient and/or coordinating care and performing allergy tests

## 2023-01-31 NOTE — PROGRESS NOTES
[facility] Dermato-allergology Note  Office visit  Encounter Date: Feb 1, 2023  ____________________________________________    CC: No chief complaint on file.      HPI:  (Feb 1, 2023)  Mr. Junaid Cormier is a(n) 72 year old male who presents today as a return patient for allergy consultation  - Follow-up in Derm-Allergy clinic for 1st readings of patch tests after 2 days   - otherwise feeling well in usual state of health    Physical exam:  General: In no acute distress, well-developed, well-nourished  Eyes: no conjunctivitis  ENT: no signs of rhinitis   Pulmonary: no wheezing or coughing  Skin:Focused examination of the skin on test sites was performed = see test results below    Earlier History and Allergy exams:  (Jan 30, 2023)  - Follow-up in Derm-Allergy clinic for prick and patch tests as planned (stop antihistamines 1 week prior)  - Had the 2nd Adbry injection last Friday.   Skin on arms and legs much better now and no scratch marks    Earlier History and Allergy exams:  (Nov 29, 2022)  - patient developed more than 2 years ago a generalized eczematous rash and Dr. Virgen put the patient on Dupixent September 2021 and it worked perfectly until a stent operation in March/Mai 2022 --> since then even on Dupixent recurrent flare ups    Skin: skin still very dry and on then hands very lichenified and still dermatitis even on Dupixent. Only about 70% cleared up and as well itching sometimes recurrent    Past Medical History:   Patient Active Problem List   Diagnosis     Intrinsic atopic dermatitis     No past medical history on file.    Allergies:  No Known Allergies    Medications:  Current Outpatient Medications   Medication     aspirin (ASA) 81 MG EC tablet     atorvastatin (LIPITOR) 80 MG tablet     augmented betamethasone dipropionate (DIPROLENE) 0.05 % external lotion     augmented betamethasone dipropionate (DIPROLENE-AF) 0.05 % external ointment     cetirizine HCl 10 MG CAPS      desonide (DESOWEN) 0.05 % external ointment     dupilumab (DUPIXENT) 300 MG/2ML prefilled pen     dupilumab (DUPIXENT) 300 MG/2ML prefilled pen     fexofenadine (ALLEGRA) 180 MG tablet     hydrOXYzine (ATARAX) 25 MG tablet     hydrOXYzine (ATARAX) 25 MG tablet     lisinopril (ZESTRIL) 20 MG tablet     tacrolimus (PROTOPIC) 0.1 % external ointment     tamsulosin (FLOMAX) 0.4 MG capsule     Tralokinumab-ldrm 150 MG/ML SOSY     urea (GORMEL) 20 % external cream     No current facility-administered medications for this visit.       Social History:  The patient is retired. Patient has the following hobbies or non-occupational exposure: golf, ski    Family History:  No family history on file.    Previous Labs, Allergy Tests, Dermatopathology, Imaging:  See files of Dr. Virgen    Referred By: Gilberto Virgen MD  [address] [place] [id number]     Allergy Tests:    Past Allergy Test    Order for Future Allergy Testing:    [x] Outpatient  [] Inpatient: [facility]..../ Bed ....       Skin Atopy (atopic dermatitis) [] Yes   [x] No ..but brother had aD as child  Contact allergies:   [x] Yes   [] No ..Deodorant   Hand eczema:   [x] Yes   [] No           Leading hand:   [] R   [] L       [] Ambidextrous         Drug allergies:        [] Yes   [x] No  which?......    Urticaria/Angioedema  [] Yes   [x] No .........  Food Allergy:  [] Yes   [x] No  which?......  Pets :  [] Yes   [x] No  Which?...never problems with pets        [x]  Rhinitis   [] Conjunctivitis   [] Sinusitis   [] Polyposis   [] Otitis   [] Pharyngitis         []  Postnasal drip    [x]  none recently constantly runny nose  Operations:   [] Tonsils   [] Septum   [] Sinus   [] Polyposis        [] Asthma bronchiale   [] Coughing      [x]  none  Symptoms (mostly Rhinoconjunctivitis and Asthma) aggravated by:  Season   [] I   [] II   [] III   [] IV   []V   []VI   []VII   []VIII   []IX   []X   []XI   []XII     [] perennial   Day time      [] morning   [] noon      []  evening        [] night    [] whole day........  []  none  Location/changes    [] inside        [] outside   [] mountains    [] sea     [] others.............   []  none  Triggers, specific     [] animals     [] plants     [] dust              [] others ...........................    []  none  Triggers, others       [] work          [] psyche    [] sport            [] others .............................  []  none  Irritant                [] phys efforts [] smoke    [] heat/cold     [] odors  []others............... []  none    Order for PATCH TESTS  Reason for tests (suspected allergy): to rule out additional contact sensitization  Known previous allergies: none  Standardized panels  [x] Standard panel (40 tests)  [x] Preservatives & Antimicrobials (31 tests)  [x] Emulsifiers & Additives (25 tests)   [x] Perfumes/Flavours & Plants (25 tests)  [] Hairdresser panel (12 tests)  [] Rubber Chemicals (22 tests)  [] Plastics (26 tests)  [] Colorants/Dyes/Food additives (20 tests)  [] Metals (implants/dental) (24 tests)  [] Local anaesthetics/NSAIDs (13 tests)  [] Antibiotics & Antimycotics (14 tests)   [x] Corticosteroids (15 tests)   [] Photopatch test (62 tests)   [] others: ...      [] Patient's own products: ...    DO NOT test if chemical or biological identity is unknown!     always ask from patient the product information and safety sheets (MSDS)       Order for PRICK TESTS    Reason for tests (suspected allergy): atopy?  Known previous allergies: none    Standardized prick panels  [x] Atopic panel (20 tests)  [] Pediatric Panel (12 tests)  [] Milk, Meat, Eggs, Grains (20 tests)   [] Dust, Epithelia, Feathers (10 tests)  [] Fish, Seafood, Shellfish (17 tests)  [] Nuts, Beans (14 tests)  [] Spice, Vegetable, Fruit (17 tests)  [] Pollen Panel = Tree, Grass, Weed (24 tests)  [] Others: ...      [] Patient's own products: ...    DO NOT test if chemical or biological identity is unknown!     always ask from patient the  product information and safety sheets (MSDS)     Standardized intradermal tests  [x] Penicillium notatum [x] Aspergillus fumigatus [x] House dust mites D.far & D. pteron  [] Cat    [] dog  [] Others: ...  [] Bee venom   [] Wasp venom  !!Specific protocol with dilutions!!       Order for Drug allergy tests (prick & Intradermal & patch tests)    [] Penicillin G  [] Ampicillin [] Cefazolin   [] Ceftriaxone   [] Ceftazidime  [] Bactrim    [] Others: ...  Order for ... as test date    ________________________________  RESULTS & EVALUATION of PATCH TESTS    Jan 30, 2023 application of patch tests:    STANDARD Series                                          # Substance 2 days 4 days remarks     1 Keith Mix [C] - -       2 Colophony - -       3  2-Mercaptobenzothiazole  - -       4 Methylisothiazolinone - -       5 Carba Mix - -       6 Thiuram Mix [A] - -       7 Bisphenol A Epoxy Resin - -       8 C-Pgti-Ouzsqrlyenp-Formaldehyde Resin - -       9 Mercapto Mix [A] - -       10 Black Rubber Mix- PPD [B] - -       11 Potassium Dichromate  -  -       12 Balsam of [place] (Myroxylon Pereirae Resin) - -       13 Nickel Sulphate Hexahydrate - -       14 Mixed Dialkyl Thiourea - -       15 Paraben Mix [B] - -       16 Methyldibromo Glutaronitrile - -       17 Fragrance Mix 8% (+) -       18 2-Bromo-2-Nitropropane-1,3-Diol (Bronopol) CT N/A N/A       19 Lyral - -       20 Tixocortol-21- Pivalate CT - -       21 Diazolidinyl urea (Germall II) - -        22 Methyl Methacrylate - -       23 Cobalt (II) Chloride Hexahydrate - -       24 Fragrance Mix II  - -       25 Compositae Mix - -       26 Benzoyl Peroxide - -       27 Bacitracin - -       28 Formaldehyde - -       29 Methylchloroisothiazolinone / Methylisothiazolinone - -       30 Corticosteroid Mix CT - -       31 Sodium Lauryl Sulfate - -       32 Lanolin Alcohol - -       33 Turpentine - -       34 Cetylstearylalcohol - -       35 Chlorhexidine Dicluconate - -       36  Budesonide - -       37 Imidazolidinyl Urea  - -       38 Ethyl-2 Cyanoacrylate - -       39 Quaternium 15 (Dowicil 200) - -       40 Decyl Glucoside - -     PRESERVATIVES & ANTIMICROBIALS        # Substance 2 days 4 days remarks   41 1  1,2-Benzisothiazoline-3-One, Sodium Salt - -     2  1,3,5-Walter (2-Hydroxyethyl) - Hexahydrotriazine (Grotan BK) - -     3 8-Kdnldspaedlcw-3-Nitro-1, 3-Propanediol - -     4  3, 4, 4' - Triclocarban - -    45 5 4 - Chloro - 3 - Cresol - -     6 4 - Chloro - 4 - Xylenol (PCMX) - -     7 7-Ethylbicyclooxazolidine (Bioban [id number]) - -     8 Benzalkonium Chloride CT - -     9 Benzyl Alcohol - -    50 10 Cetalkonium Chloride - -     11 Cetylpyrimidine Chloride  - -     12 Chloroacetamide - -     13 DMDM Hydantoin - -     14 Glutaraldehyde - -    55 15 Triclosan - -     16 Glyoxal Trimeric Dihydrate - -     17 Iodopropynyl Butylcarbamate - -     18 Octylisothiazoline - -     19 Bithionol CT - -    60 20 Bioban P 1487 (Nitrobutyl) Morpholine/(Ethylnitro-Trimethylene) Dimorpholine - -     21 Phenoxyethanol - -     22 Phenyl Salicylate - -     23 Povidone Iodine - -     24 Sodium Benzoate - -    65 25 Sodium Disulfite - -     26 Sorbic Acid - -     27 Thimerosal - -     28 Melamine Formaldehyde Resin - -     29 Ethylenediamine Dihydrochloride - -      Parabens      70 30 Butyl-P-Hydroxybenzoate - -     31 Ethyl-P-Hydroxybenzoate N/A N/A     32 Methyl-P-Hydroxybenzoate - -    73 33 Propyl-P-Hydroxybenzoate - -    EMULSIFIERS & ADDITIVES       # Substance 2 days 4 days remarks   74 1 Polyethylene Glycol-400 - -    75 2 Cocamidopropyl Betaine - -     3 Amerchol L101 - -     4 Propylene Glycol - -     5 Triethanolamine - -     6 Sorbitane Sesquiolate CT - -    80 7 Isopropylmyristate - -     8 Polysorbate 80 CT - -     9 Amidoamine   (Stearamidopropyl Dimethylamine) - -     10 Oleamidopropyl Dimethylamine - -     11 Lauryl Glucoside - -    85 12 Coconut Diethanolamide  - -     13  2-Hydroxy-4-Methoxy Benzophenone (Oxybenzone) - -     14 Benzophenone-4 (Sulisobenzon) - -     15 Propolis - -     16 Dexpanthenol - -    90 17 Abitol - -     18 Tert-Butylhydroquinone - -     19 Benzyl Salicylate - -     20 Dimethylaminopropylamin (DMPA) - -     21 Zinc Pyrithione (Zinc Omadine)  - -    95 22 Walter(Hydroxymethyl) Nitromethane  - -      Antioxidant       23 Dodecyl Gallate - -     24 Butylhydroxyanisole (BHA) - -     25 Butylhydroxytoluene (BHT) - -     26 Di-Alpha-Tocopherol (Vit E) - -    100 27 Propyl Gallate - -    PERFUMES, FLAVORS & PLANTS        # Substance 2 days 4 days remarks   101 1 Benzyl Cinnamate - -     2 Di-Limonene (Dipentene) - -     3 Cananga Odorata (Fannie Greco) (I) - -     4 Lichen Acid Mix - -    105 5 Mentha Piperita Oil (Peppermint Oil) - -     6 Sesquiterpenelactone mix - -     7 Tea Tree Oil, Oxidized - -     8 Wood Tar Mix - -     9 Abietic Acid N/A N/A    110 10 Lavendula Angustifolia Oil (Lavender Oil) - -     11 Fragrance mix II CT * 14% N/A N/A      Fragrance Mix I       12 [place]moss Absolute - -     13 Eugenol - -     14 Geraniol - -    115 15 Hydroxycitronellal - -     16 Isoeugenol (+) -     17 Cinnamic Aldehyde - -     18 Cinnamic Alcohol  - -      Fragrance mix II       19 Citronellol - -    120 20 Alpha-Hexylcinnamic Aldehyde    - -     21 Citral - -     22 Farnesol - -    123 23 Coumarin - -      Hexylcinnamic aldehyde, Coumarin, Farnesol, Hydroxyisohexy3-cyclohexene carboxaldehyde, citral, citrolellol  CORTICOSTEROIDS   # Substance 2 days 4 days remarks Allergy  class   124 1 Amcinonide - -  B   125 2 Betametasone-17,21 Dipropionate - -  D1    3 Desoximetasone - -  C    4 Betamethasone-17-Valerate - -  D1    5 Dexamethasone - -  C    6 Hydrocortisone - -  A   130 7 Clobetasol-17-Propionate - -  D1    8 Dexamethasone-21-Phosphate Disodium Salt - -  C    9 Hydrocortisone-17 Butyrate - -  D2    10 Prednisolone - -  A    11 Triamcinolone Acetonide - -  B   135 12  Methylprednisolone Aceponate - -  D2    13 Hydrocortisone-21-Acetate - -  A   137 14 Prednicarbate N/A N/A  D2     Group Characteristics of group Generic name Name  cross reactions   A Hydrocortisone   Cloprednole, Fludrocortisone acétate, Hydrocortison acetate, Methylprednisolone, Prednisolone, Tixocortolpivalate Alfacortone, Fucidin H, Dermacalm, Hexacortone, Premandole, Imacort With group D2   B Triamcinolone-acetonide   Budenoside (R-isomer), Amcinonide, Desonide, Fluocinolone acetonide, Triamcinolone acetonide Locapred, Locatop  Synalar, Pevisone, Kenacort -   C Betamethasone (Without Azalea)   Betamethasone, Dexamethasone, Flumethasone pivalate, Halomethasone Daivobet, Dexasalyl, Locasalen,   -   D1 Betamethasone-diproprionate   Betamethasone dipropionate, Betamethasone-17-valerate, Clobetasole-propionate, Fluticasone propionate, Mometasone furoate Betnovate, Diprogenta, Diprosalic, Diprosone, Celestoderm, Fucicort,  Cutivate, Axotide, Elocom -   D2 Methylprednisolone-aceponate   Hydrocortisone-aceponate, Hydrocortisone-buteprate, Hydrocortisone-17-butyrate, Methylprednisolone aceponate, Prednicarbate Locoïd, Advantan,  Prednitop With group A and Budesonide (S-isomer)     Results of patch tests:                         Interpretation:  - Negative                    A    = Allergic      (+) Erythema    TI   = Toxic/irritant   + E + Infiltration    RaP = Relevance at Present     ++ E/I + Papulovesicle   Rpr  = Relevance Previously     +++ E/I/P + Blister     nR   = No Relevance      Atopy Screen (Placed Jan 30, 2023)  No Substance Readings (15 min) Evaluation   POS Histamine 1mg/ml -    NEG NaCl 0.9% -      No Substance Readings (15 min) Evaluation   1 Alternaria alternata (tenuis)  -    2 Cladosporium herbarum -    3 Aspergillus fumigatus -    4 Penicillium notatum -    5 Dermatophagoides pteronyssinus -    6 Dermatophagoides farinae -    7 Dog epithelium (canis spp) -    8 Cat hair (jyothi catus) -    9  Cockroach   (Blatella americana & germanica) -    10 Grass mix midwest   (Maida, Orchard, Redtop, Garrison) -    11 Juan grass (sorghum halepense) -    12 Weed mix   (common Cocklebur, Lamb s quarters, rough redroot Pigweed, Dock/Sorrel) -    13 Mug wort (artemisia vulgare) -    14 Ragweed giant/short (ambrosia spp) -    15 White birch (Betula papyrifera) -    16 Tree mix 1 (Pecan, Maple BHR, Oak RVW, american Jeffrey, black [place]) -    17 Red cedar (juniperus virginia) -    18 Tree mix 2   (white Javad, river/red Birch, black Kenner, common Thornton, american Elm) -    19 Box elder/Maple mix (acer spp) -    20 Stone shagbark (carya ovata) -           Conclusion      Intradermal Testing (Placed Jan 30, 2023)  No Substance Conc.  Reading (15min)  immediate Papule [mm] / Erythema [mm] Reading   (... days)  delayed Papule [mm] / Erythema [mm] Remarks   DF Standard Dust Mite - D. Farinae 1:10 + 7/7  -    DP Standard Dust Mite - D. Pteronyssinus 1:10 + 6/6  -    A Aspergillus fumigatus  1:10 -  (+) 6/6    P Penicillium notatum 1:10 -   -    Conclusions: immediate type reaction to dust mites. Slighlty delayed reaction to mold Aspergillus     [x] No relevant allergic reaction observed   Initial reaction to fragrance mix and Isoeugenol    [] Allergic reaction diagnosed against following allergens:      Interpretation/ remarks:   See later    [] Patient information given   [] ACDS CAMP information's (# ....) to following compounds: .....   [] General information's to following compounds: ......      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 I would be happy to respond to any concerns or questions you may have.     ___________________________    Staff: : provider    Follow-up in Derm-Allergy clinic for 2nd readings and final conclusions after 4 days   ___________________________    I spent a total of 28 minutes with Junaid Cormier during today s  visit. This time was spent discussing all the individual test results, correlating them to the clinical relevance, counseling the patient and/or coordinating care

## 2023-02-01 ENCOUNTER — OFFICE VISIT (OUTPATIENT)
Dept: ALLERGY | Facility: CLINIC | Age: 73
End: 2023-02-01
Payer: MEDICARE

## 2023-02-01 DIAGNOSIS — H10.10 SEASONAL AND PERENNIAL ALLERGIC RHINOCONJUNCTIVITIS: Primary | ICD-10-CM

## 2023-02-01 DIAGNOSIS — J30.2 SEASONAL AND PERENNIAL ALLERGIC RHINOCONJUNCTIVITIS: Primary | ICD-10-CM

## 2023-02-01 DIAGNOSIS — L20.89 OTHER ATOPIC DERMATITIS: ICD-10-CM

## 2023-02-01 DIAGNOSIS — L23.89 ALLERGIC CONTACT DERMATITIS DUE TO OTHER AGENTS: ICD-10-CM

## 2023-02-01 DIAGNOSIS — J30.89 SEASONAL AND PERENNIAL ALLERGIC RHINOCONJUNCTIVITIS: Primary | ICD-10-CM

## 2023-02-01 PROCEDURE — 99207 PR NO CHARGE LOS: CPT | Performed by: DERMATOLOGY

## 2023-02-01 NOTE — NURSING NOTE
Chief Complaint   Patient presents with     Allergy Testing Followup     Sean is here today for patch testing day 3     Lois Tanner RN

## 2023-02-01 NOTE — LETTER
2/1/2023         RE: Junaid Cormier  [address] [place] [address]        Dear Colleague,    Thank you for referring your patient, Junaid Cormier, to the [facility] [place]. Please see a copy of my visit note below.    [facility] Dermato-allergology Note  Office visit  Encounter Date: Feb 1, 2023  ____________________________________________    CC: No chief complaint on file.      HPI:  (Feb 1, 2023)  Mr. Junaid Cormier is a(n) 72 year old male who presents today as a return patient for allergy consultation  - Follow-up in Derm-Allergy clinic for 1st readings of patch tests after 2 days   - otherwise feeling well in usual state of health    Physical exam:  General: In no acute distress, well-developed, well-nourished  Eyes: no conjunctivitis  ENT: no signs of rhinitis   Pulmonary: no wheezing or coughing  Skin:Focused examination of the skin on test sites was performed = see test results below    Earlier History and Allergy exams:  (Jan 30, 2023)  - Follow-up in Derm-Allergy clinic for prick and patch tests as planned (stop antihistamines 1 week prior)  - Had the 2nd Adbry injection last Friday.   Skin on arms and legs much better now and no scratch marks    Earlier History and Allergy exams:  (Nov 29, 2022)  - patient developed more than 2 years ago a generalized eczematous rash and Dr. Virgen put the patient on Dupixent September 2021 and it worked perfectly until a stent operation in March/Mai 2022 --> since then even on Dupixent recurrent flare ups    Skin: skin still very dry and on then hands very lichenified and still dermatitis even on Dupixent. Only about 70% cleared up and as well itching sometimes recurrent    Past Medical History:   Patient Active Problem List   Diagnosis     Intrinsic atopic dermatitis     No past medical history on file.    Allergies:  No Known Allergies    Medications:  Current Outpatient Medications   Medication      aspirin (ASA) 81 MG EC tablet     atorvastatin (LIPITOR) 80 MG tablet     augmented betamethasone dipropionate (DIPROLENE) 0.05 % external lotion     augmented betamethasone dipropionate (DIPROLENE-AF) 0.05 % external ointment     cetirizine HCl 10 MG CAPS     desonide (DESOWEN) 0.05 % external ointment     dupilumab (DUPIXENT) 300 MG/2ML prefilled pen     dupilumab (DUPIXENT) 300 MG/2ML prefilled pen     fexofenadine (ALLEGRA) 180 MG tablet     hydrOXYzine (ATARAX) 25 MG tablet     hydrOXYzine (ATARAX) 25 MG tablet     lisinopril (ZESTRIL) 20 MG tablet     tacrolimus (PROTOPIC) 0.1 % external ointment     tamsulosin (FLOMAX) 0.4 MG capsule     Tralokinumab-ldrm 150 MG/ML SOSY     urea (GORMEL) 20 % external cream     No current facility-administered medications for this visit.       Social History:  The patient is retired. Patient has the following hobbies or non-occupational exposure: golf, ski    Family History:  No family history on file.    Previous Labs, Allergy Tests, Dermatopathology, Imaging:  See files of Dr. Virgen    Referred By: Gilberto Virgen MD  [address] [place] [id number]     Allergy Tests:    Past Allergy Test    Order for Future Allergy Testing:    [x] Outpatient  [] Inpatient: [facility]..../ Bed ....       Skin Atopy (atopic dermatitis) [] Yes   [x] No ..but brother had aD as child  Contact allergies:   [x] Yes   [] No ..Deodorant   Hand eczema:   [x] Yes   [] No           Leading hand:   [] R   [] L       [] Ambidextrous         Drug allergies:        [] Yes   [x] No  which?......    Urticaria/Angioedema  [] Yes   [x] No .........  Food Allergy:  [] Yes   [x] No  which?......  Pets :  [] Yes   [x] No  Which?...never problems with pets        [x]  Rhinitis   [] Conjunctivitis   [] Sinusitis   [] Polyposis   [] Otitis   [] Pharyngitis         []  Postnasal drip    [x]  none recently constantly runny nose  Operations:   [] Tonsils   [] Septum   [] Sinus   [] Polyposis        [] Asthma  bronchiale   [] Coughing      [x]  none  Symptoms (mostly Rhinoconjunctivitis and Asthma) aggravated by:  Season   [] I   [] II   [] III   [] IV   []V   []VI   []VII   []VIII   []IX   []X   []XI   []XII     [] perennial   Day time      [] morning   [] noon      [] evening        [] night    [] whole day........  []  none  Location/changes    [] inside        [] outside   [] mountains    [] sea     [] others.............   []  none  Triggers, specific     [] animals     [] plants     [] dust              [] others ...........................    []  none  Triggers, others       [] work          [] psyche    [] sport            [] others .............................  []  none  Irritant                [] phys efforts [] smoke    [] heat/cold     [] odors  []others............... []  none    Order for PATCH TESTS  Reason for tests (suspected allergy): to rule out additional contact sensitization  Known previous allergies: none  Standardized panels  [x] Standard panel (40 tests)  [x] Preservatives & Antimicrobials (31 tests)  [x] Emulsifiers & Additives (25 tests)   [x] Perfumes/Flavours & Plants (25 tests)  [] Hairdresser panel (12 tests)  [] Rubber Chemicals (22 tests)  [] Plastics (26 tests)  [] Colorants/Dyes/Food additives (20 tests)  [] Metals (implants/dental) (24 tests)  [] Local anaesthetics/NSAIDs (13 tests)  [] Antibiotics & Antimycotics (14 tests)   [x] Corticosteroids (15 tests)   [] Photopatch test (62 tests)   [] others: ...      [] Patient's own products: ...    DO NOT test if chemical or biological identity is unknown!     always ask from patient the product information and safety sheets (MSDS)       Order for PRICK TESTS    Reason for tests (suspected allergy): atopy?  Known previous allergies: none    Standardized prick panels  [x] Atopic panel (20 tests)  [] Pediatric Panel (12 tests)  [] Milk, Meat, Eggs, Grains (20 tests)   [] Dust, Epithelia, Feathers (10 tests)  [] Fish, Seafood, Shellfish (17  tests)  [] Nuts, Beans (14 tests)  [] Spice, Vegetable, Fruit (17 tests)  [] Pollen Panel = Tree, Grass, Weed (24 tests)  [] Others: ...      [] Patient's own products: ...    DO NOT test if chemical or biological identity is unknown!     always ask from patient the product information and safety sheets (MSDS)     Standardized intradermal tests  [x] Penicillium notatum [x] Aspergillus fumigatus [x] House dust mites D.far & D. pteron  [] Cat    [] dog  [] Others: ...  [] Bee venom   [] Wasp venom  !!Specific protocol with dilutions!!       Order for Drug allergy tests (prick & Intradermal & patch tests)    [] Penicillin G  [] Ampicillin [] Cefazolin   [] Ceftriaxone   [] Ceftazidime  [] Bactrim    [] Others: ...  Order for ... as test date    ________________________________  RESULTS & EVALUATION of PATCH TESTS    Jan 30, 2023 application of patch tests:    STANDARD Series                                          # Substance 2 days 4 days remarks     1 Keith Mix [C] - -       2 Colophony - -       3  2-Mercaptobenzothiazole  - -       4 Methylisothiazolinone - -       5 Carba Mix - -       6 Thiuram Mix [A] - -       7 Bisphenol A Epoxy Resin - -       8 D-Jlyf-Ahqofuflurz-Formaldehyde Resin - -       9 Mercapto Mix [A] - -       10 Black Rubber Mix- PPD [B] - -       11 Potassium Dichromate  -  -       12 Balsam of [place] (Myroxylon Pereirae Resin) - -       13 Nickel Sulphate Hexahydrate - -       14 Mixed Dialkyl Thiourea - -       15 Paraben Mix [B] - -       16 Methyldibromo Glutaronitrile - -       17 Fragrance Mix 8% (+) -       18 2-Bromo-2-Nitropropane-1,3-Diol (Bronopol) CT N/A N/A       19 Lyral - -       20 Tixocortol-21- Pivalate CT - -       21 Diazolidinyl urea (Germall II) - -        22 Methyl Methacrylate - -       23 Cobalt (II) Chloride Hexahydrate - -       24 Fragrance Mix II  - -       25 Compositae Mix - -       26 Benzoyl Peroxide - -       27 Bacitracin - -       28 Formaldehyde - -       29  Methylchloroisothiazolinone / Methylisothiazolinone - -       30 Corticosteroid Mix CT - -       31 Sodium Lauryl Sulfate - -       32 Lanolin Alcohol - -       33 Turpentine - -       34 Cetylstearylalcohol - -       35 Chlorhexidine Dicluconate - -       36 Budesonide - -       37 Imidazolidinyl Urea  - -       38 Ethyl-2 Cyanoacrylate - -       39 Quaternium 15 (Dowicil 200) - -       40 Decyl Glucoside - -     PRESERVATIVES & ANTIMICROBIALS        # Substance 2 days 4 days remarks   41 1  1,2-Benzisothiazoline-3-One, Sodium Salt - -     2  1,3,5-Walter (2-Hydroxyethyl) - Hexahydrotriazine (Grotan BK) - -     3 0-Kalthymvwehzy-6-Nitro-1, 3-Propanediol - -     4  3, 4, 4' - Triclocarban - -    45 5 4 - Chloro - 3 - Cresol - -     6 4 - Chloro - 4 - Xylenol (PCMX) - -     7 7-Ethylbicyclooxazolidine (Bioban [id number]) - -     8 Benzalkonium Chloride CT - -     9 Benzyl Alcohol - -    50 10 Cetalkonium Chloride - -     11 Cetylpyrimidine Chloride  - -     12 Chloroacetamide - -     13 DMDM Hydantoin - -     14 Glutaraldehyde - -    55 15 Triclosan - -     16 Glyoxal Trimeric Dihydrate - -     17 Iodopropynyl Butylcarbamate - -     18 Octylisothiazoline - -     19 Bithionol CT - -    60 20 Bioban P 1487 (Nitrobutyl) Morpholine/(Ethylnitro-Trimethylene) Dimorpholine - -     21 Phenoxyethanol - -     22 Phenyl Salicylate - -     23 Povidone Iodine - -     24 Sodium Benzoate - -    65 25 Sodium Disulfite - -     26 Sorbic Acid - -     27 Thimerosal - -     28 Melamine Formaldehyde Resin - -     29 Ethylenediamine Dihydrochloride - -      Parabens      70 30 Butyl-P-Hydroxybenzoate - -     31 Ethyl-P-Hydroxybenzoate N/A N/A     32 Methyl-P-Hydroxybenzoate - -    73 33 Propyl-P-Hydroxybenzoate - -    EMULSIFIERS & ADDITIVES       # Substance 2 days 4 days remarks   74 1 Polyethylene Glycol-400 - -    75 2 Cocamidopropyl Betaine - -     3 Amerchol L101 - -     4 Propylene Glycol - -     5 Triethanolamine - -     6 Sorbitane  Sesquiolate CT - -    80 7 Isopropylmyristate - -     8 Polysorbate 80 CT - -     9 Amidoamine   (Stearamidopropyl Dimethylamine) - -     10 Oleamidopropyl Dimethylamine - -     11 Lauryl Glucoside - -    85 12 Coconut Diethanolamide  - -     13 2-Hydroxy-4-Methoxy Benzophenone (Oxybenzone) - -     14 Benzophenone-4 (Sulisobenzon) - -     15 Propolis - -     16 Dexpanthenol - -    90 17 Abitol - -     18 Tert-Butylhydroquinone - -     19 Benzyl Salicylate - -     20 Dimethylaminopropylamin (DMPA) - -     21 Zinc Pyrithione (Zinc Omadine)  - -    95 22 Walter(Hydroxymethyl) Nitromethane  - -      Antioxidant       23 Dodecyl Gallate - -     24 Butylhydroxyanisole (BHA) - -     25 Butylhydroxytoluene (BHT) - -     26 Di-Alpha-Tocopherol (Vit E) - -    100 27 Propyl Gallate - -    PERFUMES, FLAVORS & PLANTS        # Substance 2 days 4 days remarks   101 1 Benzyl Cinnamate - -     2 Di-Limonene (Dipentene) - -     3 Cananga Odorata (Fannie Greco) (I) - -     4 Lichen Acid Mix - -    105 5 Mentha Piperita Oil (Peppermint Oil) - -     6 Sesquiterpenelactone mix - -     7 Tea Tree Oil, Oxidized - -     8 Wood Tar Mix - -     9 Abietic Acid N/A N/A    110 10 Lavendula Angustifolia Oil (Lavender Oil) - -     11 Fragrance mix II CT * 14% N/A N/A      Fragrance Mix I       12 [place]moss Absolute - -     13 Eugenol - -     14 Geraniol - -    115 15 Hydroxycitronellal - -     16 Isoeugenol (+) -     17 Cinnamic Aldehyde - -     18 Cinnamic Alcohol  - -      Fragrance mix II       19 Citronellol - -    120 20 Alpha-Hexylcinnamic Aldehyde    - -     21 Citral - -     22 Farnesol - -    123 23 Coumarin - -      Hexylcinnamic aldehyde, Coumarin, Farnesol, Hydroxyisohexy3-cyclohexene carboxaldehyde, citral, citrolellol  CORTICOSTEROIDS   # Substance 2 days 4 days remarks Allergy  class   124 1 Amcinonide - -  B   125 2 Betametasone-17,21 Dipropionate - -  D1    3 Desoximetasone - -  C    4 Betamethasone-17-Valerate - -  D1    5  Dexamethasone - -  C    6 Hydrocortisone - -  A   130 7 Clobetasol-17-Propionate - -  D1    8 Dexamethasone-21-Phosphate Disodium Salt - -  C    9 Hydrocortisone-17 Butyrate - -  D2    10 Prednisolone - -  A    11 Triamcinolone Acetonide - -  B   135 12 Methylprednisolone Aceponate - -  D2    13 Hydrocortisone-21-Acetate - -  A   137 14 Prednicarbate N/A N/A  D2     Group Characteristics of group Generic name Name  cross reactions   A Hydrocortisone   Cloprednole, Fludrocortisone acétate, Hydrocortison acetate, Methylprednisolone, Prednisolone, Tixocortolpivalate Alfacortone, Fucidin H, Dermacalm, Hexacortone, Premandole, Imacort With group D2   B Triamcinolone-acetonide   Budenoside (R-isomer), Amcinonide, Desonide, Fluocinolone acetonide, Triamcinolone acetonide Locapred, Locatop  Synalar, Pevisone, Kenacort -   C Betamethasone (Without Azalea)   Betamethasone, Dexamethasone, Flumethasone pivalate, Halomethasone Daivobet, Dexasalyl, Locasalen,   -   D1 Betamethasone-diproprionate   Betamethasone dipropionate, Betamethasone-17-valerate, Clobetasole-propionate, Fluticasone propionate, Mometasone furoate Betnovate, Diprogenta, Diprosalic, Diprosone, Celestoderm, Fucicort,  Cutivate, Axotide, Elocom -   D2 Methylprednisolone-aceponate   Hydrocortisone-aceponate, Hydrocortisone-buteprate, Hydrocortisone-17-butyrate, Methylprednisolone aceponate, Prednicarbate Locoïd, Advantan,  Prednitop With group A and Budesonide (S-isomer)     Results of patch tests:                         Interpretation:  - Negative                    A    = Allergic      (+) Erythema    TI   = Toxic/irritant   + E + Infiltration    RaP = Relevance at Present     ++ E/I + Papulovesicle   Rpr  = Relevance Previously     +++ E/I/P + Blister     nR   = No Relevance      Atopy Screen (Placed Jan 30, 2023)  No Substance Readings (15 min) Evaluation   POS Histamine 1mg/ml -    NEG NaCl 0.9% -      No Substance Readings (15 min) Evaluation   1 Alternaria  alternata (tenuis)  -    2 Cladosporium herbarum -    3 Aspergillus fumigatus -    4 Penicillium notatum -    5 Dermatophagoides pteronyssinus -    6 Dermatophagoides farinae -    7 Dog epithelium (canis spp) -    8 Cat hair (jyothi catus) -    9 Cockroach   (Blatella americana & germanica) -    10 Grass mix midwest   (Maida, Orchard, Redtop, Garrison) -    11 Juan grass (sorghum halepense) -    12 Weed mix   (common Cocklebur, Lamb s quarters, rough redroot Pigweed, Dock/Sorrel) -    13 Mug wort (artemisia vulgare) -    14 Ragweed giant/short (ambrosia spp) -    15 White birch (Betula papyrifera) -    16 Tree mix 1 (Pecan, Maple BHR, Oak RVW, american [place], black [place]) -    17 Red cedar (juniperus virginia) -    18 Tree mix 2   (white Javad, river/red Birch, black [place], common [place], american Elm) -    19 Box elder/Maple mix (acer spp) -    20 [place] shagbark (carya ovata) -           Conclusion      Intradermal Testing (Placed Jan 30, 2023)  No Substance Conc.  Reading (15min)  immediate Papule [mm] / Erythema [mm] Reading   (... days)  delayed Papule [mm] / Erythema [mm] Remarks   DF Standard Dust Mite - D. Farinae 1:10 + 7/7  -    DP Standard Dust Mite - D. Pteronyssinus 1:10 + 6/6  -    A Aspergillus fumigatus  1:10 -  (+) 6/6    P Penicillium notatum 1:10 -   -    Conclusions: immediate type reaction to dust mites. Slighlty delayed reaction to mold Aspergillus     [x] No relevant allergic reaction observed   Initial reaction to fragrance mix and Isoeugenol    [] Allergic reaction diagnosed against following allergens:      Interpretation/ remarks:   See later    [] Patient information given   [] ACDS CAMP information's (# ....) to following compounds: .....   [] General information's to following compounds: ......      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 I would be happy to respond to any concerns or questions you may have.     ___________________________    Staff: : provider    Follow-up in Derm-Allergy clinic for 2nd readings and final conclusions after 4 days   ___________________________    I spent a total of 28 minutes with Junaid Cormier during today s  visit. This time was spent discussing all the individual test results, correlating them to the clinical relevance, counseling the patient and/or coordinating care           Again, thank you for allowing me to participate in the care of your patient.        Sincerely,        Quincy Abraham MD

## 2023-02-02 NOTE — PROGRESS NOTES
[facility] Dermato-allergology Note  Office visit  Encounter Date: Feb 3, 2023  ____________________________________________    CC: No chief complaint on file.      HPI:  (Feb 3, 2023)  Mr. Junaid Cormier is a(n) 72 year old male who presents today as a return patient for allergy consultation  - Follow-up in Derm-Allergy clinic for 2nd readings and final conclusions after 4 days   - otherwise feeling well in usual state of health    Physical exam:  General: In no acute distress, well-developed, well-nourished  Eyes: no conjunctivitis  ENT: no signs of rhinitis   Pulmonary: no wheezing or coughing  Skin:Focused examination of the skin on test sites was performed = see test results below    Earlier History and Allergy exams:  (Feb 1, 2023)  - Follow-up in Derm-Allergy clinic for 1st readings of patch tests after 2 days     Earlier History and Allergy exams:  (Jan 30, 2023)  - Follow-up in Derm-Allergy clinic for prick and patch tests as planned (stop antihistamines 1 week prior)  - Had the 2nd Adbry injection last Friday.   Skin on arms and legs much better now and no scratch marks    Earlier History and Allergy exams:  (Nov 29, 2022)  - patient developed more than 2 years ago a generalized eczematous rash and Dr. Virgen put the patient on Dupixent September 2021 and it worked perfectly until a stent operation in March/Mai 2022 --> since then even on Dupixent recurrent flare ups    Skin: skin still very dry and on then hands very lichenified and still dermatitis even on Dupixent. Only about 70% cleared up and as well itching sometimes recurrent    Past Medical History:   Patient Active Problem List   Diagnosis     Intrinsic atopic dermatitis     No past medical history on file.    Allergies:  No Known Allergies    Medications:  Current Outpatient Medications   Medication     aspirin (ASA) 81 MG EC tablet     atorvastatin (LIPITOR) 80 MG tablet     augmented betamethasone dipropionate  (DIPROLENE) 0.05 % external lotion     augmented betamethasone dipropionate (DIPROLENE-AF) 0.05 % external ointment     cetirizine HCl 10 MG CAPS     desonide (DESOWEN) 0.05 % external ointment     dupilumab (DUPIXENT) 300 MG/2ML prefilled pen     dupilumab (DUPIXENT) 300 MG/2ML prefilled pen     fexofenadine (ALLEGRA) 180 MG tablet     hydrOXYzine (ATARAX) 25 MG tablet     hydrOXYzine (ATARAX) 25 MG tablet     lisinopril (ZESTRIL) 20 MG tablet     tacrolimus (PROTOPIC) 0.1 % external ointment     tamsulosin (FLOMAX) 0.4 MG capsule     Tralokinumab-ldrm 150 MG/ML SOSY     urea (GORMEL) 20 % external cream     No current facility-administered medications for this visit.       Social History:  The patient is retired. Patient has the following hobbies or non-occupational exposure: golf, ski    Family History:  No family history on file.    Previous Labs, Allergy Tests, Dermatopathology, Imaging:  See files of Dr. Virgen    Referred By: Gilberto Virgen MD  [address] [place] [id number]     Allergy Tests:    Past Allergy Test    Order for Future Allergy Testing:    [x] Outpatient  [] Inpatient: [facility]..../ Bed ....       Skin Atopy (atopic dermatitis) [] Yes   [x] No ..but brother had aD as child  Contact allergies:   [x] Yes   [] No ..Deodorant   Hand eczema:   [x] Yes   [] No           Leading hand:   [] R   [] L       [] Ambidextrous         Drug allergies:        [] Yes   [x] No  which?......    Urticaria/Angioedema  [] Yes   [x] No .........  Food Allergy:  [] Yes   [x] No  which?......  Pets :  [] Yes   [x] No  Which?...never problems with pets        [x]  Rhinitis   [] Conjunctivitis   [] Sinusitis   [] Polyposis   [] Otitis   [] Pharyngitis         []  Postnasal drip    [x]  none recently constantly runny nose  Operations:   [] Tonsils   [] Septum   [] Sinus   [] Polyposis        [] Asthma bronchiale   [] Coughing      [x]  none  Symptoms (mostly Rhinoconjunctivitis and Asthma) aggravated by:  Season   []  I   [] II   [] III   [] IV   []V   []VI   []VII   []VIII   []IX   []X   []XI   []XII     [] perennial   Day time      [] morning   [] noon      [] evening        [] night    [] whole day........  []  none  Location/changes    [] inside        [] outside   [] mountains    [] sea     [] others.............   []  none  Triggers, specific     [] animals     [] plants     [] dust              [] others ...........................    []  none  Triggers, others       [] work          [] psyche    [] sport            [] others .............................  []  none  Irritant                [] phys efforts [] smoke    [] heat/cold     [] odors  []others............... []  none    Order for PATCH TESTS  Reason for tests (suspected allergy): to rule out additional contact sensitization  Known previous allergies: none  Standardized panels  [x] Standard panel (40 tests)  [x] Preservatives & Antimicrobials (31 tests)  [x] Emulsifiers & Additives (25 tests)   [x] Perfumes/Flavours & Plants (25 tests)  [] Hairdresser panel (12 tests)  [] Rubber Chemicals (22 tests)  [] Plastics (26 tests)  [] Colorants/Dyes/Food additives (20 tests)  [] Metals (implants/dental) (24 tests)  [] Local anaesthetics/NSAIDs (13 tests)  [] Antibiotics & Antimycotics (14 tests)   [x] Corticosteroids (15 tests)   [] Photopatch test (62 tests)   [] others: ...      [] Patient's own products: ...    DO NOT test if chemical or biological identity is unknown!     always ask from patient the product information and safety sheets (MSDS)       Order for PRICK TESTS    Reason for tests (suspected allergy): atopy?  Known previous allergies: none    Standardized prick panels  [x] Atopic panel (20 tests)  [] Pediatric Panel (12 tests)  [] Milk, Meat, Eggs, Grains (20 tests)   [] Dust, Epithelia, Feathers (10 tests)  [] Fish, Seafood, Shellfish (17 tests)  [] Nuts, Beans (14 tests)  [] Spice, Vegetable, Fruit (17 tests)  [] Pollen Panel = Tree, Grass, Weed (24  tests)  [] Others: ...      [] Patient's own products: ...    DO NOT test if chemical or biological identity is unknown!     always ask from patient the product information and safety sheets (MSDS)     Standardized intradermal tests  [x] Penicillium notatum [x] Aspergillus fumigatus [x] House dust mites D.far & D. pteron  [] Cat    [] dog  [] Others: ...  [] Bee venom   [] Wasp venom  !!Specific protocol with dilutions!!       Order for Drug allergy tests (prick & Intradermal & patch tests)    [] Penicillin G  [] Ampicillin [] Cefazolin   [] Ceftriaxone   [] Ceftazidime  [] Bactrim    [] Others: ...  Order for ... as test date    ________________________________  RESULTS & EVALUATION of PATCH TESTS    Jan 30, 2023 application of patch tests:    STANDARD Series                                          # Substance 2 days 4 days remarks     1 Keith Mix [C] - -       2 Colophony - -       3  2-Mercaptobenzothiazole  - -       4 Methylisothiazolinone - -       5 Carba Mix - -       6 Thiuram Mix [A] - -       7 Bisphenol A Epoxy Resin - -       8 K-Ijta-Okuwvrjttcj-Formaldehyde Resin - -       9 Mercapto Mix [A] - -       10 Black Rubber Mix- PPD [B] - -       11 Potassium Dichromate  -  -       12 Balsam of [place] (Myroxylon Pereirae Resin) - -       13 Nickel Sulphate Hexahydrate - -       14 Mixed Dialkyl Thiourea - -       15 Paraben Mix [B] - -       16 Methyldibromo Glutaronitrile - -       17 Fragrance Mix 8% (+) +       18 2-Bromo-2-Nitropropane-1,3-Diol (Bronopol) CT N/A N/A       19 Lyral - -       20 Tixocortol-21- Pivalate CT - -       21 Diazolidinyl urea (Germall II) - -        22 Methyl Methacrylate - -       23 Cobalt (II) Chloride Hexahydrate - -       24 Fragrance Mix II  - -       25 Compositae Mix - -       26 Benzoyl Peroxide - -       27 Bacitracin - -       28 Formaldehyde - -       29 Methylchloroisothiazolinone / Methylisothiazolinone - -       30 Corticosteroid Mix CT - -       31 Sodium Lauryl  Sulfate - -       32 Lanolin Alcohol - -       33 Turpentine - -       34 Cetylstearylalcohol - -       35 Chlorhexidine Dicluconate - -       36 Budesonide - -       37 Imidazolidinyl Urea  - -       38 Ethyl-2 Cyanoacrylate - -       39 Quaternium 15 (Dowicil 200) - -       40 Decyl Glucoside - -     PRESERVATIVES & ANTIMICROBIALS        # Substance 2 days 4 days remarks   41 1  1,2-Benzisothiazoline-3-One, Sodium Salt - -     2  1,3,5-Walter (2-Hydroxyethyl) - Hexahydrotriazine (Grotan BK) - -     3 0-Ghstoxdbfxndd-9-Nitro-1, 3-Propanediol - -     4  3, 4, 4' - Triclocarban - -    45 5 4 - Chloro - 3 - Cresol - -     6 4 - Chloro - 4 - Xylenol (PCMX) - -     7 7-Ethylbicyclooxazolidine (Bioban [id number]) - -     8 Benzalkonium Chloride CT - -     9 Benzyl Alcohol - -    50 10 Cetalkonium Chloride - -     11 Cetylpyrimidine Chloride  - -     12 Chloroacetamide - -     13 DMDM Hydantoin - -     14 Glutaraldehyde - -    55 15 Triclosan - -     16 Glyoxal Trimeric Dihydrate - -     17 Iodopropynyl Butylcarbamate - -     18 Octylisothiazoline - -     19 Bithionol CT - -    60 20 Bioban P 1487 (Nitrobutyl) Morpholine/(Ethylnitro-Trimethylene) Dimorpholine - -     21 Phenoxyethanol - -     22 Phenyl Salicylate - -     23 Povidone Iodine - -     24 Sodium Benzoate - -    65 25 Sodium Disulfite - -     26 Sorbic Acid - -     27 Thimerosal - -     28 Melamine Formaldehyde Resin - -     29 Ethylenediamine Dihydrochloride - -      Parabens      70 30 Butyl-P-Hydroxybenzoate - -     31 Ethyl-P-Hydroxybenzoate N/A N/A     32 Methyl-P-Hydroxybenzoate - -    73 33 Propyl-P-Hydroxybenzoate - -    EMULSIFIERS & ADDITIVES       # Substance 2 days 4 days remarks   74 1 Polyethylene Glycol-400 - -    75 2 Cocamidopropyl Betaine - -     3 Amerchol L101 - -     4 Propylene Glycol - -     5 Triethanolamine - -     6 Sorbitane Sesquiolate CT - -    80 7 Isopropylmyristate - -     8 Polysorbate 80 CT - -     9 Amidoamine   (Stearamidopropyl  Dimethylamine) - -     10 Oleamidopropyl Dimethylamine - -     11 Lauryl Glucoside - -    85 12 Coconut Diethanolamide  - -     13 2-Hydroxy-4-Methoxy Benzophenone (Oxybenzone) - -     14 Benzophenone-4 (Sulisobenzon) - -     15 Propolis - -     16 Dexpanthenol - -    90 17 Abitol - -     18 Tert-Butylhydroquinone - -     19 Benzyl Salicylate - -     20 Dimethylaminopropylamin (DMPA) - -     21 Zinc Pyrithione (Zinc Omadine)  - -    95 22 Walter(Hydroxymethyl) Nitromethane  - -      Antioxidant       23 Dodecyl Gallate - -     24 Butylhydroxyanisole (BHA) - -     25 Butylhydroxytoluene (BHT) - -     26 Di-Alpha-Tocopherol (Vit E) - -    100 27 Propyl Gallate - -    PERFUMES, FLAVORS & PLANTS        # Substance 2 days 4 days remarks   101 1 Benzyl Cinnamate - -     2 Di-Limonene (Dipentene) - -     3 Cananga Odorata (Fannie Greco) (I) - -     4 Lichen Acid Mix - -    105 5 Mentha Piperita Oil (Peppermint Oil) - -     6 Sesquiterpenelactone mix - -     7 Tea Tree Oil, Oxidized - -     8 Wood Tar Mix - -     9 Abietic Acid N/A N/A    110 10 Lavendula Angustifolia Oil (Lavender Oil) - -     11 Fragrance mix II CT * 14% N/A N/A      Fragrance Mix I       12 [place]moss Absolute - -     13 Eugenol - -     14 Geraniol - -    115 15 Hydroxycitronellal - -     16 Isoeugenol (+) +/++     17 Cinnamic Aldehyde - -     18 Cinnamic Alcohol  - -      Fragrance mix II       19 Citronellol - -    120 20 Alpha-Hexylcinnamic Aldehyde    - -     21 Citral - -     22 Farnesol - -    123 23 Coumarin - -      Hexylcinnamic aldehyde, Coumarin, Farnesol, Hydroxyisohexy3-cyclohexene carboxaldehyde, citral, citrolellol  CORTICOSTEROIDS   # Substance 2 days 4 days remarks Allergy  class   124 1 Amcinonide - -  B   125 2 Betametasone-17,21 Dipropionate - -  D1    3 Desoximetasone - -  C    4 Betamethasone-17-Valerate - -  D1    5 Dexamethasone - -  C    6 Hydrocortisone - -  A   130 7 Clobetasol-17-Propionate - -  D1    8 Dexamethasone-21-Phosphate  Disodium Salt - -  C    9 Hydrocortisone-17 Butyrate - -  D2    10 Prednisolone - -  A    11 Triamcinolone Acetonide - -  B   135 12 Methylprednisolone Aceponate - -  D2    13 Hydrocortisone-21-Acetate - -  A   137 14 Prednicarbate N/A N/A  D2     Group Characteristics of group Generic name Name  cross reactions   A Hydrocortisone   Cloprednole, Fludrocortisone acétate, Hydrocortison acetate, Methylprednisolone, Prednisolone, Tixocortolpivalate Alfacortone, Fucidin H, Dermacalm, Hexacortone, Premandole, Imacort With group D2   B Triamcinolone-acetonide   Budenoside (R-isomer), Amcinonide, Desonide, Fluocinolone acetonide, Triamcinolone acetonide Locapred, Locatop  Synalar, Pevisone, Kenacort -   C Betamethasone (Without Azalea)   Betamethasone, Dexamethasone, Flumethasone pivalate, Halomethasone Daivobet, Dexasalyl, Locasalen,   -   D1 Betamethasone-diproprionate   Betamethasone dipropionate, Betamethasone-17-valerate, Clobetasole-propionate, Fluticasone propionate, Mometasone furoate Betnovate, Diprogenta, Diprosalic, Diprosone, Celestoderm, Fucicort,  Cutivate, Axotide, Elocom -   D2 Methylprednisolone-aceponate   Hydrocortisone-aceponate, Hydrocortisone-buteprate, Hydrocortisone-17-butyrate, Methylprednisolone aceponate, Prednicarbate Locoïd, Advantan,  Prednitop With group A and Budesonide (S-isomer)     Results of patch tests:                         Interpretation:  - Negative                    A    = Allergic      (+) Erythema    TI   = Toxic/irritant   + E + Infiltration    RaP = Relevance at Present     ++ E/I + Papulovesicle   Rpr  = Relevance Previously     +++ E/I/P + Blister     nR   = No Relevance      Atopy Screen (Placed Jan 30, 2023)  No Substance Readings (15 min) Evaluation   POS Histamine 1mg/ml -    NEG NaCl 0.9% -      No Substance Readings (15 min) Evaluation   1 Alternaria alternata (tenuis)  -    2 Cladosporium herbarum -    3 Aspergillus fumigatus -    4 Penicillium notatum -    5  Dermatophagoides pteronyssinus -    6 Dermatophagoides farinae -    7 Dog epithelium (canis spp) -    8 Cat hair (jyothi catus) -    9 Cockroach   (Blatella americana & germanica) -    10 Grass mix midwest   (Maida, Orchard, Redtop, Garrison) -    11 Ujan grass (sorghum halepense) -    12 Weed mix   (common Cocklebur, Lamb s quarters, rough redroot Pigweed, Dock/Sorrel) -    13 Mug wort (artemisia vulgare) -    14 Ragweed giant/short (ambrosia spp) -    15 White birch (Betula papyrifera) -    16 Tree mix 1 (Pecan, Maple BHR, Oak RVW, american Powderly, black Valentine) -    17 Red cedar (juniperus virginia) -    18 Tree mix 2   (white Javad, river/red Birch, black [place], common [place], american Elm) -    19 Box elder/Maple mix (acer spp) -    20 [place] shagbark (carya ovata) -           Conclusion      Intradermal Testing (Placed Jan 30, 2023)  No Substance Conc.  Reading (15min)  immediate Papule [mm] / Erythema [mm] Reading   (... days)  delayed Papule [mm] / Erythema [mm] Remarks   DF Standard Dust Mite - D. Farinae 1:10 + 7/7  -    DP Standard Dust Mite - D. Pteronyssinus 1:10 + 6/6  -    A Aspergillus fumigatus  1:10 -  (+) 6/6    P Penicillium notatum 1:10 -   -    Conclusions: immediate type reaction to dust mites. Slighlty delayed reaction to mold Aspergillus       [x] Allergic reaction diagnosed against following allergens:  + fragrance mix and +/++ Isoeugenol      Interpretation/ remarks:   Patient seems to have an atopy with some sensitizations to dust mites, but there is as well an underlying contact sensitization to fragrances and this could explain why the biologics only worked partially.     [x] Patient information given   [x] ACDS CAMP information's (# [id number], and [id number]) to following compounds: fragrance mix and Isoeugenol   [] General information's to following compounds: ......      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    ==> Treatment Plan:  >> patient now on Adbry (2nd dose = about 3 weeks on Adbry) ==> continue with Adbry every 2 weeks  >> follow the recommendations of the CAMP Yue and use on the skin ONLY products from the Yue    I earn consulting income from [employer], the company that manufactures the product I am recommending for you. I would be happy to respond to any concerns or questions you may have.   _________________________    Staff: : provider    Follow-up in Derm-Allergy clinic if necessary and then with Dr. Virgen  ___________________________    I spent a total of 35 minutes with Junaid Cormier during today s  visit. This time was spent discussing all the individual test results, correlating them to the clinical relevance, counseling the patient and/or coordinating care. Moreover time was spent to install and explain the CAMP Yue from [employer] with the informations on the individual allergens and propositions of products that can be used. Please see Assessment and Plan for additional details.

## 2023-02-03 ENCOUNTER — OFFICE VISIT (OUTPATIENT)
Dept: ALLERGY | Facility: CLINIC | Age: 73
End: 2023-02-03
Payer: MEDICARE

## 2023-02-03 DIAGNOSIS — L20.89 OTHER ATOPIC DERMATITIS: Primary | ICD-10-CM

## 2023-02-03 DIAGNOSIS — Z91.09 HOUSE DUST MITE ALLERGY: ICD-10-CM

## 2023-02-03 DIAGNOSIS — H10.10 SEASONAL AND PERENNIAL ALLERGIC RHINOCONJUNCTIVITIS: ICD-10-CM

## 2023-02-03 DIAGNOSIS — J30.89 SEASONAL AND PERENNIAL ALLERGIC RHINOCONJUNCTIVITIS: ICD-10-CM

## 2023-02-03 DIAGNOSIS — J30.2 SEASONAL AND PERENNIAL ALLERGIC RHINOCONJUNCTIVITIS: ICD-10-CM

## 2023-02-03 DIAGNOSIS — L23.89 ALLERGIC CONTACT DERMATITIS DUE TO OTHER AGENTS: ICD-10-CM

## 2023-02-03 PROCEDURE — 99214 OFFICE O/P EST MOD 30 MIN: CPT | Performed by: DERMATOLOGY

## 2023-02-03 NOTE — NURSING NOTE
Chief Complaint   Patient presents with     Allergy Testing Followup     Patch testing day 5       Vanessa MCCARTHY RN

## 2023-02-03 NOTE — PATIENT INSTRUCTIONS
[x] Patient information given                       [x] ACDS CAMP information's (# [id number], and [id number]) to following compounds: fragrance mix and Isoeugenol                       [] General information's to following compounds: ......        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     ==> Treatment Plan:  >> switch from Dupixent to Adbry (Tralokinumab), because not total control of dermatitis with Dupixent anymore (either blocking Dupixent autoantibodies or additional contact allergy)  ==> inject double loading dose of Adbry 2 weeks after last dose of Dupixent                House Dust Mite Allergy        The house dust mite is an arachnid about 0.3 mm in size and not visible to the naked eye. There are around 150 species of house dust mites in the world. One mite produces up to 40 fecal droppings a day. One teaspoonful of bedroom dust contains an average of nearly 1000 mites and 250,000 minute droppings.    Causes and triggers of house dust mite allergy  The house dust mite requires a warm, moist environment without light in order to live and reproduce. Our beds are ideal. The mite feeds on human and animal skin scales. The allergen is mainly contained in the mite's feces. The feces contain allergy-triggering constituents which are spread in fine dust,  are breathed in and can cause an allergic reaction.    Symptoms  When the allergens come into contact with the mucous membranes in the eyes, nose, mouth and throat, sufferers develop symptoms typical of an allergic cold (allergic rhinitis) or an allergic inflammation of the conjunctiva (allergic conjunctivitis): blocked or runny nose, sneezing, red, itchy eyes. If all of these symptoms are present, then the condition is also known as rhinoconjunctivitis. Often, the upper respiratory tract becomes chronically inflamed, primarily because house dust mites are present all year round.  The symptoms of house dust mite allergy typically occur in the morning and are more frequent in the cold months of the year.    Therapy and treatment  As a first step, mattress, pillows and duvet/comforter should be placed in mite-proof or anti-mite covers, sometimes known as encasings. Alternatively you can use pillows or comforter that can be washed at over 130 F monthly. At the same time, house dust should be minimized. If necessary, the symptoms can be treated with medication, for example antihistamines in the form of nasal sprays, eye drops and tablets. Desensitization/specific immunotherapy (SIT) is recommended for house dust allergy if all the measures above are not sufficient.    Tips and tricks:  Keep room temperature at 66-70 F and relative air humidity at a maximum of 50%.  Ideally, thoroughly air your home two to three times a day for 5 to 10 minutes each time.  Wash bed linens in at least 130 F every week.  Remove stuffed animals or freeze them every other week.  Keep ceiling fans off in the bedroom as they can stir up dust mite allergens.  Remove dust from furniture with a damp cloth and regularly wet mop floors.  Do not put pot and hydroponic plants in the bedroom and also avoid putting too many in living areas, as they increase room humidity.  When staying overnight in other accommodations, we recommend taking your own bed  "linen and the above anti-mite mattress covers with you.  Remove upholstered furniture from the bedroom and consider removing the carpets. Ideally, use sealed parquet or laminate zenaida, cork tiles or zenaida made of wood, novilon or PVC.  Maybe additionally reduce dust mites in mattress, upholstery, or zenaida using hot steam .      Modified from \"House Dust Mite Allergy\" by [employer] [facility].    "

## 2023-02-03 NOTE — LETTER
2/3/2023         RE: Junaid Cormier  [address] [place] [address]        Dear Colleague,    Thank you for referring your patient, Junaid Cormier, to the [facility] [place]. Please see a copy of my visit note below.    [facility] Dermato-allergology Note  Office visit  Encounter Date: Feb 3, 2023  ____________________________________________    CC: No chief complaint on file.      HPI:  (Feb 3, 2023)  Mr. Junaid Cormier is a(n) 72 year old male who presents today as a return patient for allergy consultation  - Follow-up in Derm-Allergy clinic for 2nd readings and final conclusions after 4 days   - otherwise feeling well in usual state of health    Physical exam:  General: In no acute distress, well-developed, well-nourished  Eyes: no conjunctivitis  ENT: no signs of rhinitis   Pulmonary: no wheezing or coughing  Skin:Focused examination of the skin on test sites was performed = see test results below    Earlier History and Allergy exams:  (Feb 1, 2023)  - Follow-up in Derm-Allergy clinic for 1st readings of patch tests after 2 days     Earlier History and Allergy exams:  (Jan 30, 2023)  - Follow-up in Derm-Allergy clinic for prick and patch tests as planned (stop antihistamines 1 week prior)  - Had the 2nd Adbry injection last Friday.   Skin on arms and legs much better now and no scratch marks    Earlier History and Allergy exams:  (Nov 29, 2022)  - patient developed more than 2 years ago a generalized eczematous rash and Dr. Virgen put the patient on Dupixent September 2021 and it worked perfectly until a stent operation in March/Mai 2022 --> since then even on Dupixent recurrent flare ups    Skin: skin still very dry and on then hands very lichenified and still dermatitis even on Dupixent. Only about 70% cleared up and as well itching sometimes recurrent    Past Medical History:   Patient Active Problem List   Diagnosis     Intrinsic atopic  dermatitis     No past medical history on file.    Allergies:  No Known Allergies    Medications:  Current Outpatient Medications   Medication     aspirin (ASA) 81 MG EC tablet     atorvastatin (LIPITOR) 80 MG tablet     augmented betamethasone dipropionate (DIPROLENE) 0.05 % external lotion     augmented betamethasone dipropionate (DIPROLENE-AF) 0.05 % external ointment     cetirizine HCl 10 MG CAPS     desonide (DESOWEN) 0.05 % external ointment     dupilumab (DUPIXENT) 300 MG/2ML prefilled pen     dupilumab (DUPIXENT) 300 MG/2ML prefilled pen     fexofenadine (ALLEGRA) 180 MG tablet     hydrOXYzine (ATARAX) 25 MG tablet     hydrOXYzine (ATARAX) 25 MG tablet     lisinopril (ZESTRIL) 20 MG tablet     tacrolimus (PROTOPIC) 0.1 % external ointment     tamsulosin (FLOMAX) 0.4 MG capsule     Tralokinumab-ldrm 150 MG/ML SOSY     urea (GORMEL) 20 % external cream     No current facility-administered medications for this visit.       Social History:  The patient is retired. Patient has the following hobbies or non-occupational exposure: golf, ski    Family History:  No family history on file.    Previous Labs, Allergy Tests, Dermatopathology, Imaging:  See files of Dr. Virgen    Referred By: Gilberto Virgen MD  [address] [place] [id number]     Allergy Tests:    Past Allergy Test    Order for Future Allergy Testing:    [x] Outpatient  [] Inpatient: [facility]..../ Bed ....       Skin Atopy (atopic dermatitis) [] Yes   [x] No ..but brother had aD as child  Contact allergies:   [x] Yes   [] No ..Deodorant   Hand eczema:   [x] Yes   [] No           Leading hand:   [] R   [] L       [] Ambidextrous         Drug allergies:        [] Yes   [x] No  which?......    Urticaria/Angioedema  [] Yes   [x] No .........  Food Allergy:  [] Yes   [x] No  which?......  Pets :  [] Yes   [x] No  Which?...never problems with pets        [x]  Rhinitis   [] Conjunctivitis   [] Sinusitis   [] Polyposis   [] Otitis   [] Pharyngitis         []   Postnasal drip    [x]  none recently constantly runny nose  Operations:   [] Tonsils   [] Septum   [] Sinus   [] Polyposis        [] Asthma bronchiale   [] Coughing      [x]  none  Symptoms (mostly Rhinoconjunctivitis and Asthma) aggravated by:  Season   [] I   [] II   [] III   [] IV   []V   []VI   []VII   []VIII   []IX   []X   []XI   []XII     [] perennial   Day time      [] morning   [] noon      [] evening        [] night    [] whole day........  []  none  Location/changes    [] inside        [] outside   [] mountains    [] sea     [] others.............   []  none  Triggers, specific     [] animals     [] plants     [] dust              [] others ...........................    []  none  Triggers, others       [] work          [] psyche    [] sport            [] others .............................  []  none  Irritant                [] phys efforts [] smoke    [] heat/cold     [] odors  []others............... []  none    Order for PATCH TESTS  Reason for tests (suspected allergy): to rule out additional contact sensitization  Known previous allergies: none  Standardized panels  [x] Standard panel (40 tests)  [x] Preservatives & Antimicrobials (31 tests)  [x] Emulsifiers & Additives (25 tests)   [x] Perfumes/Flavours & Plants (25 tests)  [] Hairdresser panel (12 tests)  [] Rubber Chemicals (22 tests)  [] Plastics (26 tests)  [] Colorants/Dyes/Food additives (20 tests)  [] Metals (implants/dental) (24 tests)  [] Local anaesthetics/NSAIDs (13 tests)  [] Antibiotics & Antimycotics (14 tests)   [x] Corticosteroids (15 tests)   [] Photopatch test (62 tests)   [] others: ...      [] Patient's own products: ...    DO NOT test if chemical or biological identity is unknown!     always ask from patient the product information and safety sheets (MSDS)       Order for PRICK TESTS    Reason for tests (suspected allergy): atopy?  Known previous allergies: none    Standardized prick panels  [x] Atopic panel (20 tests)  []  Pediatric Panel (12 tests)  [] Milk, Meat, Eggs, Grains (20 tests)   [] Dust, Epithelia, Feathers (10 tests)  [] Fish, Seafood, Shellfish (17 tests)  [] Nuts, Beans (14 tests)  [] Spice, Vegetable, Fruit (17 tests)  [] Pollen Panel = Tree, Grass, Weed (24 tests)  [] Others: ...      [] Patient's own products: ...    DO NOT test if chemical or biological identity is unknown!     always ask from patient the product information and safety sheets (MSDS)     Standardized intradermal tests  [x] Penicillium notatum [x] Aspergillus fumigatus [x] House dust mites D.far & D. pteron  [] Cat    [] dog  [] Others: ...  [] Bee venom   [] Wasp venom  !!Specific protocol with dilutions!!       Order for Drug allergy tests (prick & Intradermal & patch tests)    [] Penicillin G  [] Ampicillin [] Cefazolin   [] Ceftriaxone   [] Ceftazidime  [] Bactrim    [] Others: ...  Order for ... as test date    ________________________________  RESULTS & EVALUATION of PATCH TESTS    Jan 30, 2023 application of patch tests:    STANDARD Series                                          # Substance 2 days 4 days remarks     1 Keith Mix [C] - -       2 Colophony - -       3  2-Mercaptobenzothiazole  - -       4 Methylisothiazolinone - -       5 Carba Mix - -       6 Thiuram Mix [A] - -       7 Bisphenol A Epoxy Resin - -       8 O-Ursp-Nrrjxctxcme-Formaldehyde Resin - -       9 Mercapto Mix [A] - -       10 Black Rubber Mix- PPD [B] - -       11 Potassium Dichromate  -  -       12 Balsam of [place] (Myroxylon Pereirae Resin) - -       13 Nickel Sulphate Hexahydrate - -       14 Mixed Dialkyl Thiourea - -       15 Paraben Mix [B] - -       16 Methyldibromo Glutaronitrile - -       17 Fragrance Mix 8% (+) +       18 2-Bromo-2-Nitropropane-1,3-Diol (Bronopol) CT N/A N/A       19 Lyral - -       20 Tixocortol-21- Pivalate CT - -       21 Diazolidinyl urea (Germall II) - -        22 Methyl Methacrylate - -       23 Cobalt (II) Chloride Hexahydrate - -       24  Fragrance Mix II  - -       25 Compositae Mix - -       26 Benzoyl Peroxide - -       27 Bacitracin - -       28 Formaldehyde - -       29 Methylchloroisothiazolinone / Methylisothiazolinone - -       30 Corticosteroid Mix CT - -       31 Sodium Lauryl Sulfate - -       32 Lanolin Alcohol - -       33 Turpentine - -       34 Cetylstearylalcohol - -       35 Chlorhexidine Dicluconate - -       36 Budesonide - -       37 Imidazolidinyl Urea  - -       38 Ethyl-2 Cyanoacrylate - -       39 Quaternium 15 (Dowicil 200) - -       40 Decyl Glucoside - -     PRESERVATIVES & ANTIMICROBIALS        # Substance 2 days 4 days remarks   41 1  1,2-Benzisothiazoline-3-One, Sodium Salt - -     2  1,3,5-Walter (2-Hydroxyethyl) - Hexahydrotriazine (Grotan BK) - -     3 7-Mxtkvyzigraen-5-Nitro-1, 3-Propanediol - -     4  3, 4, 4' - Triclocarban - -    45 5 4 - Chloro - 3 - Cresol - -     6 4 - Chloro - 4 - Xylenol (PCMX) - -     7 7-Ethylbicyclooxazolidine (Bioban [id number]) - -     8 Benzalkonium Chloride CT - -     9 Benzyl Alcohol - -    50 10 Cetalkonium Chloride - -     11 Cetylpyrimidine Chloride  - -     12 Chloroacetamide - -     13 DMDM Hydantoin - -     14 Glutaraldehyde - -    55 15 Triclosan - -     16 Glyoxal Trimeric Dihydrate - -     17 Iodopropynyl Butylcarbamate - -     18 Octylisothiazoline - -     19 Bithionol CT - -    60 20 Bioban P 1487 (Nitrobutyl) Morpholine/(Ethylnitro-Trimethylene) Dimorpholine - -     21 Phenoxyethanol - -     22 Phenyl Salicylate - -     23 Povidone Iodine - -     24 Sodium Benzoate - -    65 25 Sodium Disulfite - -     26 Sorbic Acid - -     27 Thimerosal - -     28 Melamine Formaldehyde Resin - -     29 Ethylenediamine Dihydrochloride - -      Parabens      70 30 Butyl-P-Hydroxybenzoate - -     31 Ethyl-P-Hydroxybenzoate N/A N/A     32 Methyl-P-Hydroxybenzoate - -    73 33 Propyl-P-Hydroxybenzoate - -    EMULSIFIERS & ADDITIVES       # Substance 2 days 4 days remarks   74 1 Polyethylene  Glycol-400 - -    75 2 Cocamidopropyl Betaine - -     3 Amerchol L101 - -     4 Propylene Glycol - -     5 Triethanolamine - -     6 Sorbitane Sesquiolate CT - -    80 7 Isopropylmyristate - -     8 Polysorbate 80 CT - -     9 Amidoamine   (Stearamidopropyl Dimethylamine) - -     10 Oleamidopropyl Dimethylamine - -     11 Lauryl Glucoside - -    85 12 Coconut Diethanolamide  - -     13 2-Hydroxy-4-Methoxy Benzophenone (Oxybenzone) - -     14 Benzophenone-4 (Sulisobenzon) - -     15 Propolis - -     16 Dexpanthenol - -    90 17 Abitol - -     18 Tert-Butylhydroquinone - -     19 Benzyl Salicylate - -     20 Dimethylaminopropylamin (DMPA) - -     21 Zinc Pyrithione (Zinc Omadine)  - -    95 22 Walter(Hydroxymethyl) Nitromethane  - -      Antioxidant       23 Dodecyl Gallate - -     24 Butylhydroxyanisole (BHA) - -     25 Butylhydroxytoluene (BHT) - -     26 Di-Alpha-Tocopherol (Vit E) - -    100 27 Propyl Gallate - -    PERFUMES, FLAVORS & PLANTS        # Substance 2 days 4 days remarks   101 1 Benzyl Cinnamate - -     2 Di-Limonene (Dipentene) - -     3 Cananga Odorata (Fannie Greco) (I) - -     4 Lichen Acid Mix - -    105 5 Mentha Piperita Oil (Peppermint Oil) - -     6 Sesquiterpenelactone mix - -     7 Tea Tree Oil, Oxidized - -     8 Wood Tar Mix - -     9 Abietic Acid N/A N/A    110 10 Lavendula Angustifolia Oil (Lavender Oil) - -     11 Fragrance mix II CT * 14% N/A N/A      Fragrance Mix I       12 Oakmoss Absolute - -     13 Eugenol - -     14 Geraniol - -    115 15 Hydroxycitronellal - -     16 Isoeugenol (+) +/++     17 Cinnamic Aldehyde - -     18 Cinnamic Alcohol  - -      Fragrance mix II       19 Citronellol - -    120 20 Alpha-Hexylcinnamic Aldehyde    - -     21 Citral - -     22 Farnesol - -    123 23 Coumarin - -      Hexylcinnamic aldehyde, Coumarin, Farnesol, Hydroxyisohexy3-cyclohexene carboxaldehyde, citral, citrolellol  CORTICOSTEROIDS   # Substance 2 days 4 days remarks Allergy  class   124 1  Amcinonide - -  B   125 2 Betametasone-17,21 Dipropionate - -  D1    3 Desoximetasone - -  C    4 Betamethasone-17-Valerate - -  D1    5 Dexamethasone - -  C    6 Hydrocortisone - -  A   130 7 Clobetasol-17-Propionate - -  D1    8 Dexamethasone-21-Phosphate Disodium Salt - -  C    9 Hydrocortisone-17 Butyrate - -  D2    10 Prednisolone - -  A    11 Triamcinolone Acetonide - -  B   135 12 Methylprednisolone Aceponate - -  D2    13 Hydrocortisone-21-Acetate - -  A   137 14 Prednicarbate N/A N/A  D2     Group Characteristics of group Generic name Name  cross reactions   A Hydrocortisone   Cloprednole, Fludrocortisone acétate, Hydrocortison acetate, Methylprednisolone, Prednisolone, Tixocortolpivalate Alfacortone, Fucidin H, Dermacalm, Hexacortone, Premandole, Imacort With group D2   B Triamcinolone-acetonide   Budenoside (R-isomer), Amcinonide, Desonide, Fluocinolone acetonide, Triamcinolone acetonide Locapred, Locatop  Synalar, Pevisone, Kenacort -   C Betamethasone (Without Azalea)   Betamethasone, Dexamethasone, Flumethasone pivalate, Halomethasone Daivobet, Dexasalyl, Locasalen,   -   D1 Betamethasone-diproprionate   Betamethasone dipropionate, Betamethasone-17-valerate, Clobetasole-propionate, Fluticasone propionate, Mometasone furoate Betnovate, Diprogenta, Diprosalic, Diprosone, Celestoderm, Fucicort,  Cutivate, Axotide, Elocom -   D2 Methylprednisolone-aceponate   Hydrocortisone-aceponate, Hydrocortisone-buteprate, Hydrocortisone-17-butyrate, Methylprednisolone aceponate, Prednicarbate Locoïd, Advantan,  Prednitop With group A and Budesonide (S-isomer)     Results of patch tests:                         Interpretation:  - Negative                    A    = Allergic      (+) Erythema    TI   = Toxic/irritant   + E + Infiltration    RaP = Relevance at Present     ++ E/I + Papulovesicle   Rpr  = Relevance Previously     +++ E/I/P + Blister     nR   = No Relevance      Atopy Screen (Placed Jan 30, 2023)  No  Substance Readings (15 min) Evaluation   POS Histamine 1mg/ml -    NEG NaCl 0.9% -      No Substance Readings (15 min) Evaluation   1 Alternaria alternata (tenuis)  -    2 Cladosporium herbarum -    3 Aspergillus fumigatus -    4 Penicillium notatum -    5 Dermatophagoides pteronyssinus -    6 Dermatophagoides farinae -    7 Dog epithelium (canis spp) -    8 Cat hair (jyothi catus) -    9 Cockroach   (Blatella americana & germanica) -    10 Grass mix midwest   (Maida, Orchard, Redtop, Garrison) -    11 Juan grass (sorghum halepense) -    12 Weed mix   (common Cocklebur, Lamb s quarters, rough redroot Pigweed, Dock/Sorrel) -    13 Mug wort (artemisia vulgare) -    14 Ragweed giant/short (ambrosia spp) -    15 White birch (Betula papyrifera) -    16 Tree mix 1 (Pecan, Maple BHR, Oak RVW, american [place], black Eagle Nest) -    17 Red cedar (juniperus virginia) -    18 Tree mix 2   (white Javad, river/red Birch, black Tolar, common Cowley, american Elm) -    19 Box elder/Maple mix (acer spp) -    20 [place]. [place] shagbark (carya ovata) -           Conclusion      Intradermal Testing (Placed Jan 30, 2023)  No Substance Conc.  Reading (15min)  immediate Papule [mm] / Erythema [mm] Reading   (... days)  delayed Papule [mm] / Erythema [mm] Remarks   DF Standard Dust Mite - D. Farinae 1:10 + 7/7  -    DP Standard Dust Mite - D. Pteronyssinus 1:10 + 6/6  -    A Aspergillus fumigatus  1:10 -  (+) 6/6    P Penicillium notatum 1:10 -   -    Conclusions: immediate type reaction to dust mites. Slighlty delayed reaction to mold Aspergillus       [x] Allergic reaction diagnosed against following allergens:  + fragrance mix and +/++ Isoeugenol      Interpretation/ remarks:   Patient seems to have an atopy with some sensitizations to dust mites, but there is as well an underlying contact sensitization to fragrances and this could explain why the biologics only worked partially.     [x] Patient information given   [x] ACDS CAMP  information's (# [id number], and [id number]) to following compounds: fragrance mix and Isoeugenol   [] General information's to following compounds: ......      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    ==> Treatment Plan:  >> patient now on Adbry (2nd dose = about 3 weeks on Adbry) ==> continue with Adbry every 2 weeks  >> follow the recommendations of the CAMP Yue and use on the skin ONLY products from the Yue    I earn consulting income from [employer], the company that manufactures the product I am recommending for you. I would be happy to respond to any concerns or questions you may have.   _________________________    Staff: : provider    Follow-up in Derm-Allergy clinic if necessary and then with Dr. Virgen  ___________________________    I spent a total of 35 minutes with Junaid Cormier during today s  visit. This time was spent discussing all the individual test results, correlating them to the clinical relevance, counseling the patient and/or coordinating care. Moreover time was spent to install and explain the CAMP Yue from [employer] with the informations on the individual allergens and propositions of products that can be used. Please see Assessment and Plan for additional details.          Again, thank you for  allowing me to participate in the care of your patient.        Sincerely,        Quincy Abraham MD

## 2023-04-19 ENCOUNTER — TELEPHONE (OUTPATIENT)
Dept: DERMATOLOGY | Facility: CLINIC | Age: 73
End: 2023-04-19
Payer: MEDICARE

## 2023-04-20 ENCOUNTER — VIRTUAL VISIT (OUTPATIENT)
Dept: DERMATOLOGY | Facility: CLINIC | Age: 73
End: 2023-04-20
Payer: MEDICARE

## 2023-04-20 DIAGNOSIS — L20.84 INTRINSIC ATOPIC DERMATITIS: Primary | ICD-10-CM

## 2023-04-20 PROCEDURE — 99441 PR PHYSICIAN TELEPHONE EVALUATION 5-10 MIN: CPT | Mod: 95 | Performed by: DERMATOLOGY

## 2023-04-20 RX ORDER — DESONIDE 0.5 MG/G
OINTMENT TOPICAL
Qty: 60 G | Refills: 3 | Status: SHIPPED | OUTPATIENT
Start: 2023-04-20 | End: 2023-01-01

## 2023-04-20 RX ORDER — FEXOFENADINE HCL 180 MG/1
180 TABLET ORAL 2 TIMES DAILY
Qty: 180 TABLET | Refills: 3 | Status: SHIPPED | OUTPATIENT
Start: 2023-04-20

## 2023-04-20 NOTE — PROGRESS NOTES
[facility] Dermatology Note  Encounter Date: Apr 20, 2023  [employer] and Telephone ([phone] ). Location of teledermatologist: [facility]MAT[facility] [place].  Start time: 1:10. End time: 1:20.    Dermatology Problem List:  1. Eczematous dermatitis: may have component of ACD; MF has been a diagnostic consideration in past but biopsies/clinical not supportive  - outside biopsy x2: subacute spongiotic dermatitis  - current therapy: tacrolimus ointment BID, urea 20% cream BID, augmented betamethasone ointment/lotion, tralokinumab  - previous therapy: mycophenolate 1000 mg BID, cetirizine 10/20, dupilumab, mg, desoximetasone/desonide/fluocinonide, nbUVB phototherapy  - in reserve: methotrexate  2. Actinic purpura: due in part to topical steroid use        ____________________________________________    Assessment & Plan:     1. Atopic dermatitis: overall stable but with recent flare primarily affecting hands. Has been on tralokinumab for about 3 months; working about the same as dupilumab. Having a fair amount of hyperkeratosis and fissuring at present. Lesser scalp involvement. We discussed some tweaks to his topical regimen, including occlusion. At this time recommend giving tralokinumab a bit more time to work so will continue systemic regimen.   - tralokinumab  - increase consistency of augmented betamethasone ointment BID under occlusion for hands  - urea for hands  - topicals elsewhere as previously directed  - restart fexofenadine 180 mg BID    Procedures Performed:    None    Follow-up: 3-4 months    Staff:     Gilberto Virgen MD, FAAD   of Dermatology  Department of Dermatology  [facility]    ____________________________________________    CC: Follow Up (4 month follow up - Atopic Dermatitis )    HPI:  Mr. Junaid Cormier is a(n) 72 year old male who presents today as a return patient for atopic  dermatitis    Atopic dermatitis - having flare  - starting itching about 4 weeks ago  - started to flare in last 2 weeks, L > R  - scalp a little more active but not itchy  - tralokinumab about same effectiveness as dupilumab   - started in January  - went golfing about a week ago - exacerbated symptoms  - forgot to wear sunscreen - sun exposure does exacerbate symptoms a bit    Using urea cream - helps with itching but not healing  - just started augmented betamethasone ointment - helpful    Patient is otherwise feeling well, without additional skin concerns.    Labs Reviewed:  N/A    Physical Exam:  Vitals: There were no vitals taken for this visit.  SKIN: Teledermatology photos were reviewed; image quality and interpretability: acceptable. Image date: 4/20/23.  - hyperkeratotic scaling and fissuring on the hands  - No other lesions of concern on areas examined.     Medications:  Current Outpatient Medications   Medication     aspirin (ASA) 81 MG EC tablet     atorvastatin (LIPITOR) 80 MG tablet     lisinopril (ZESTRIL) 20 MG tablet     tamsulosin (FLOMAX) 0.4 MG capsule     Tralokinumab-ldrm 150 MG/ML SOSY     augmented betamethasone dipropionate (DIPROLENE) 0.05 % external lotion     augmented betamethasone dipropionate (DIPROLENE-AF) 0.05 % external ointment     cetirizine HCl 10 MG CAPS     desonide (DESOWEN) 0.05 % external ointment     dupilumab (DUPIXENT) 300 MG/2ML prefilled pen     dupilumab (DUPIXENT) 300 MG/2ML prefilled pen     fexofenadine (ALLEGRA) 180 MG tablet     hydrOXYzine (ATARAX) 25 MG tablet     hydrOXYzine (ATARAX) 25 MG tablet     tacrolimus (PROTOPIC) 0.1 % external ointment     urea (GORMEL) 20 % external cream     No current facility-administered medications for this visit.      Past Medical/Surgical History:   Patient Active Problem List   Diagnosis     Intrinsic atopic dermatitis     No past medical history on file.    CC Art Vazquez MD  [address] [place] [address]  on close of this encounter.

## 2023-04-20 NOTE — NURSING NOTE
Chief Complaint   Patient presents with     Follow Up     4 month follow up - Atopic Dermatitis      Teledermatology Nurse Call Patients:     Are you in the [facility] at the time of the encounter? yes    Today's visit will be billed to you and your insurance.    A teledermatology visit is not as thorough as an in-person visit and the quality of the photograph sent may not be of the same quality as that taken by the dermatology clinic.    Shelby Kocher

## 2023-04-20 NOTE — LETTER
4/20/2023       RE: Junaid Cormier  [address] [place] [address]     Dear Colleague,    Thank you for referring your patient, Junaid Cormier, to the [facility] DERMATOLOGY CLINIC [place] at [facility]. Please see a copy of my visit note below.    [facility] Dermatology Note  Encounter Date: Apr 20, 2023  Store-and-Forward and Telephone ([phone] ). Location of teledermatologist: [facility] DERMATOLOGY CLINIC [place].  Start time: 1:10. End time: 1:20.    Dermatology Problem List:  1. Eczematous dermatitis: may have component of ACD; MF has been a diagnostic consideration in past but biopsies/clinical not supportive  - outside biopsy x2: subacute spongiotic dermatitis  - current therapy: tacrolimus ointment BID, urea 20% cream BID, augmented betamethasone ointment/lotion, tralokinumab  - previous therapy: mycophenolate 1000 mg BID, cetirizine 10/20, dupilumab, mg, desoximetasone/desonide/fluocinonide, nbUVB phototherapy  - in reserve: methotrexate  2. Actinic purpura: due in part to topical steroid use        ____________________________________________    Assessment & Plan:     1. Atopic dermatitis: overall stable but with recent flare primarily affecting hands. Has been on tralokinumab for about 3 months; working about the same as dupilumab. Having a fair amount of hyperkeratosis and fissuring at present. Lesser scalp involvement. We discussed some tweaks to his topical regimen, including occlusion. At this time recommend giving tralokinumab a bit more time to work so will continue systemic regimen.   - tralokinumab  - increase consistency of augmented betamethasone ointment BID under occlusion for hands  - urea for hands  - topicals elsewhere as previously directed  - restart fexofenadine 180 mg BID    Procedures Performed:    None    Follow-up: 3-4 months    Staff:     Gilberto Virgen MD,  BRAYAN   of Dermatology  Department of Dermatology  [facility]    ____________________________________________    CC: Follow Up (4 month follow up - Atopic Dermatitis )    HPI:  Mr. Junaid Cormier is a(n) 72 year old male who presents today as a return patient for atopic dermatitis    Atopic dermatitis - having flare  - starting itching about 4 weeks ago  - started to flare in last 2 weeks, L > R  - scalp a little more active but not itchy  - tralokinumab about same effectiveness as dupilumab   - started in January  - went golfing about a week ago - exacerbated symptoms  - forgot to wear sunscreen - sun exposure does exacerbate symptoms a bit    Using urea cream - helps with itching but not healing  - just started augmented betamethasone ointment - helpful    Patient is otherwise feeling well, without additional skin concerns.    Labs Reviewed:  N/A    Physical Exam:  Vitals: There were no vitals taken for this visit.  SKIN: Teledermatology photos were reviewed; image quality and interpretability: acceptable. Image date: 4/20/23.  - hyperkeratotic scaling and fissuring on the hands  - No other lesions of concern on areas examined.     Medications:  Current Outpatient Medications   Medication    aspirin (ASA) 81 MG EC tablet    atorvastatin (LIPITOR) 80 MG tablet    lisinopril (ZESTRIL) 20 MG tablet    tamsulosin (FLOMAX) 0.4 MG capsule    Tralokinumab-ldrm 150 MG/ML SOSY    augmented betamethasone dipropionate (DIPROLENE) 0.05 % external lotion    augmented betamethasone dipropionate (DIPROLENE-AF) 0.05 % external ointment    cetirizine HCl 10 MG CAPS    desonide (DESOWEN) 0.05 % external ointment    dupilumab (DUPIXENT) 300 MG/2ML prefilled pen    dupilumab (DUPIXENT) 300 MG/2ML prefilled pen    fexofenadine (ALLEGRA) 180 MG tablet    hydrOXYzine (ATARAX) 25 MG tablet    hydrOXYzine (ATARAX) 25 MG tablet    tacrolimus (PROTOPIC) 0.1 % external ointment    urea  (GORMEL) 20 % external cream     No current facility-administered medications for this visit.      Past Medical/Surgical History:   Patient Active Problem List   Diagnosis    Intrinsic atopic dermatitis     No past medical history on file.    CC Art Vazquez MD  [address]  [place]  [place] [address] on close of this encounter.

## 2023-04-21 ENCOUNTER — TELEPHONE (OUTPATIENT)
Dept: DERMATOLOGY | Facility: CLINIC | Age: 73
End: 2023-04-21
Payer: MEDICARE

## 2023-04-21 NOTE — TELEPHONE ENCOUNTER
Called patient to schedule follow up per Dr. Virgen. No answer and unable to leave voicemail.     Shelby Kocher

## 2023-04-24 ENCOUNTER — TELEPHONE (OUTPATIENT)
Dept: DERMATOLOGY | Facility: CLINIC | Age: 73
End: 2023-04-24
Payer: MEDICARE

## 2023-04-24 NOTE — TELEPHONE ENCOUNTER
Sarah. 2nd attempt informing patient to call [phone] to schedule 4m follow up with Dr. Virgen in Dermatology.

## 2023-06-04 DIAGNOSIS — L20.84 INTRINSIC ATOPIC DERMATITIS: Primary | ICD-10-CM

## 2023-06-04 RX ORDER — BETAMETHASONE DIPROPIONATE 0.5 MG/ML
LOTION, AUGMENTED TOPICAL
Qty: 60 ML | Refills: 0 | Status: CANCELLED | OUTPATIENT
Start: 2023-06-04

## 2023-06-04 RX ORDER — BETAMETHASONE DIPROPIONATE 0.5 MG/ML
LOTION, AUGMENTED TOPICAL
Qty: 60 ML | Refills: 0 | Status: SHIPPED | OUTPATIENT
Start: 2023-06-04 | End: 2023-07-18

## 2023-06-04 NOTE — TELEPHONE ENCOUNTER
augmented betamethasone dipropionate (DIPROLENE) 0.05 % external lotion    Last Written Prescription Date: 5/22/22  Last Fill Quantity: 60ml,   # refills: 0  Last Office Visit : 4/20/23  Future Office visit:  8/28/23    Routed  Because: last written by other provider.. Dx?

## 2023-06-17 DIAGNOSIS — L20.84 INTRINSIC ATOPIC DERMATITIS: ICD-10-CM

## 2023-06-20 RX ORDER — TACROLIMUS 1 MG/G
OINTMENT TOPICAL
Qty: 100 G | Refills: 3 | Status: SHIPPED | OUTPATIENT
Start: 2023-06-20 | End: 2023-01-01

## 2023-06-20 NOTE — TELEPHONE ENCOUNTER
tacrolimus (PROTOPIC) 0.1 % external ointment  Last Written Prescription Date:  9/8/2022  Last Fill Quantity: 100,   # refills: 3  Last Office Visit :  4/20/2023  Future Office visit: 8/28/2023  100 g. 3 Refills sent to damon Boswell RN  Central Triage Red Flags/Med Refills

## 2023-07-15 DIAGNOSIS — L20.84 INTRINSIC ATOPIC DERMATITIS: ICD-10-CM

## 2023-07-18 RX ORDER — BETAMETHASONE DIPROPIONATE 0.5 MG/G
OINTMENT, AUGMENTED TOPICAL 2 TIMES DAILY
Qty: 100 G | Refills: 11 | Status: SHIPPED | OUTPATIENT
Start: 2023-07-18 | End: 2024-01-01

## 2023-07-18 RX ORDER — BETAMETHASONE DIPROPIONATE 0.5 MG/ML
LOTION, AUGMENTED TOPICAL
Qty: 60 ML | Refills: 11 | Status: SHIPPED | OUTPATIENT
Start: 2023-07-18 | End: 2024-01-01

## 2023-07-18 NOTE — TELEPHONE ENCOUNTER
augmented betamethasone dipropionate (DIPROLENE) 0.05 % external lotion  Last Written Prescription Date:   6/4/2023  Last Fill Quantity: 60,   # refills: 0  Last Office Visit :  4/20/2023  Future Office visit:  8/28/2023  Routing refill request to provider for review/approval because:  Refer to Provider for review and refills per Providers orders.  Pt has visit in August 2023.    augmented betamethasone dipropionate (DIPROLENE-AF) 0.05 % external ointment  Last Written Prescription Date:   9/8/2022  Last Fill Quantity: 100,   # refills: 3  Last Office Visit :  4/20/2023  Future Office visit:  8/28/2023  Routing refill request to provider for review/approval because:  Refer to Provider for review and refills per Providers orders.  Pt has visit in August 2023.    Christa Boswell RN  Central Triage Red Flags/Med Refills

## 2023-08-25 NOTE — TELEPHONE ENCOUNTER
Called pt to remind him to get photos sent in for upcoming appt. Pt's wife answered, pt was not available.

## 2023-08-28 NOTE — NURSING NOTE
Teledermatology Nurse Call Patients:     Are you in the [facility] at the time of the encounter? yes    Today's visit will be billed to you and your insurance.    A teledermatology visit is not as thorough as an in-person visit and the quality of the photograph sent may not be of the same quality as that taken by the dermatology clinic.    Chief Complaint   Patient presents with    RECHECK     Dejah Monterroso  Refills are needed.    Itching  Pt added more photos to [employer] message.

## 2023-08-28 NOTE — PROGRESS NOTES
[facility] Dermatology Note  Encounter Date: Aug 28, 2023  Store-and-Forward and Telephone ([phone]). Location of teledermatologist: [facility] DERMATOLOGY CLINIC [place].  Start time: 12:58. End time: 1:08.    Dermatology Problem List:  1. Eczematous dermatitis: may have component of ACD; MF has been a diagnostic consideration in past but biopsies/clinical not supportive  - outside biopsy x2: subacute spongiotic dermatitis  - current therapy: tacrolimus ointment BID, urea 20% cream BID, augmented betamethasone ointment/lotion, tralokinumab  - previous therapy: mycophenolate 1000 mg BID, cetirizine 10/20, dupilumab, mg, desoximetasone/desonide/fluocinonide, nbUVB phototherapy  - in reserve: methotrexate  2. Actinic purpura: due in part to topical steroid use        ____________________________________________    Assessment & Plan:     Eczematous dermatitis: uncontrolled on tralokinumab. We discussed coming in for diagnostic assessment and biopsy, with concern for mycosis fungoides, which would necessitate treatment change.  - antihistamines, topical steroids, OTC anti-pruritics until that visit    Procedures Performed:    None    Follow-up: 9/1/23 at 0900 for biopsy    Staff:     Gilberto Virgen MD, FAAD   of Dermatology  Department of Dermatology  [facility]    ____________________________________________    CC: RECHECK    HPI:  Mr. Junaid Cormier is a(n) 73 year old male who presents today as a return patient for eczema    Eczema - on tralokinumab - not working  - very active and flaring  - face a bit calmer today but still very red  - arms, legs, back very itchy  - similar to dupilumab - seemed to help a little but then not helpful at all  - two nodules on ear that bleed significantly    Patient is otherwise feeling well, without additional skin concerns.    Labs Reviewed:  N/A    Physical Exam:  Vitals: There were no  vitals taken for this visit.  SKIN: Teledermatology photos were reviewed; image quality and interpretability: acceptable. Image date: 8/27/23-8/28/23.  - widespread erythematous scaly patches and plaques with prominent induration of the brows  - No other lesions of concern on areas examined.     Medications:  Current Outpatient Medications   Medication    aspirin (ASA) 81 MG EC tablet    atorvastatin (LIPITOR) 80 MG tablet    augmented betamethasone dipropionate (DIPROLENE) 0.05 % external lotion    augmented betamethasone dipropionate (DIPROLENE-AF) 0.05 % external ointment    cetirizine HCl 10 MG CAPS    desonide (DESOWEN) 0.05 % external ointment    fexofenadine (ALLEGRA) 180 MG tablet    hydrOXYzine (ATARAX) 25 MG tablet    hydrOXYzine (ATARAX) 25 MG tablet    lisinopril (ZESTRIL) 20 MG tablet    tacrolimus (PROTOPIC) 0.1 % external ointment    tamsulosin (FLOMAX) 0.4 MG capsule    Tralokinumab-ldrm 150 MG/ML SOSY    urea (GORMEL) 20 % external cream     No current facility-administered medications for this visit.      Past Medical/Surgical History:   Patient Active Problem List   Diagnosis    Intrinsic atopic dermatitis     No past medical history on file.    CC Gilberto Virgen MD  [address] [place] [phone] on close of this encounter.

## 2023-08-28 NOTE — LETTER
8/28/2023       RE: Junaid Cormier  [address] [place] [address]     Dear Colleague,    Thank you for referring your patient, Junaid Cormier, to the [facility] DERMATOLOGY CLINIC [place] at [facility]. Please see a copy of my visit note below.    [facility] Dermatology Note  Encounter Date: Aug 28, 2023  Store-and-Forward and Telephone ([phone]). Location of teledermatologist: [facility] DERMATOLOGY CLINIC [place].  Start time: 12:58. End time: 1:08.    Dermatology Problem List:  1. Eczematous dermatitis: may have component of ACD; MF has been a diagnostic consideration in past but biopsies/clinical not supportive  - outside biopsy x2: subacute spongiotic dermatitis  - current therapy: tacrolimus ointment BID, urea 20% cream BID, augmented betamethasone ointment/lotion, tralokinumab  - previous therapy: mycophenolate 1000 mg BID, cetirizine 10/20, dupilumab, mg, desoximetasone/desonide/fluocinonide, nbUVB phototherapy  - in reserve: methotrexate  2. Actinic purpura: due in part to topical steroid use        ____________________________________________    Assessment & Plan:     Eczematous dermatitis: uncontrolled on tralokinumab. We discussed coming in for diagnostic assessment and biopsy, with concern for mycosis fungoides, which would necessitate treatment change.  - antihistamines, topical steroids, OTC anti-pruritics until that visit    Procedures Performed:    None    Follow-up: 9/1/23 at 0900 for biopsy    Staff:     Gilberto Virgen MD, FAAD   of Dermatology  Department of Dermatology  [facility]    ____________________________________________    CC: RECHECK    HPI:  Mr. Junaid Cormier is a(n) 73 year old male who presents today as a return patient for eczema    Eczema - on tralokinumab - not working  - very active and flaring  - face a bit  calmer today but still very red  - arms, legs, back very itchy  - similar to dupilumab - seemed to help a little but then not helpful at all  - two nodules on ear that bleed significantly    Patient is otherwise feeling well, without additional skin concerns.    Labs Reviewed:  N/A    Physical Exam:  Vitals: There were no vitals taken for this visit.  SKIN: Teledermatology photos were reviewed; image quality and interpretability: acceptable. Image date: 8/27/23-8/28/23.  - widespread erythematous scaly patches and plaques with prominent induration of the brows  - No other lesions of concern on areas examined.     Medications:  Current Outpatient Medications   Medication    aspirin (ASA) 81 MG EC tablet    atorvastatin (LIPITOR) 80 MG tablet    augmented betamethasone dipropionate (DIPROLENE) 0.05 % external lotion    augmented betamethasone dipropionate (DIPROLENE-AF) 0.05 % external ointment    cetirizine HCl 10 MG CAPS    desonide (DESOWEN) 0.05 % external ointment    fexofenadine (ALLEGRA) 180 MG tablet    hydrOXYzine (ATARAX) 25 MG tablet    hydrOXYzine (ATARAX) 25 MG tablet    lisinopril (ZESTRIL) 20 MG tablet    tacrolimus (PROTOPIC) 0.1 % external ointment    tamsulosin (FLOMAX) 0.4 MG capsule    Tralokinumab-ldrm 150 MG/ML SOSY    urea (GORMEL) 20 % external cream     No current facility-administered medications for this visit.      Past Medical/Surgical History:   Patient Active Problem List   Diagnosis    Intrinsic atopic dermatitis     No past medical history on file.    CC Gilberto Virgen MD  [address] [place] [phone] on close of this encounter.     <<-----Click on this checkbox to enter Pre-Op Dx

## 2023-09-01 NOTE — LETTER
9/1/2023       RE: Junaid Cormier  [address] [place] [address]     Dear Colleague,    Thank you for referring your patient, Junaid Cormier, to the [facility] DERMATOLOGY CLINIC [place] at [facility]. Please see a copy of my visit note below.    [facility] Dermatology Note    Encounter Date: Sep 1, 2023    Dermatology Problem List:  1. Eczematous dermatitis: may have component of ACD; MF has been a diagnostic consideration in past but biopsies/clinical not supportive  - outside biopsy x2: subacute spongiotic dermatitis  - current therapy: tacrolimus ointment BID, urea 20% cream BID, augmented betamethasone ointment/lotion, tralokinumab  - previous therapy: mycophenolate 1000 mg BID, cetirizine 10/20, dupilumab, mg, desoximetasone/desonide/fluocinonide, nbUVB phototherapy  - in reserve: methotrexate  2. Actinic purpura: due in part to topical steroid use      Punch biopsy x2  After discussion of benefits and risks including but not limited to bleeding, infection, scar, incomplete removal, recurrence, and non-diagnostic biopsy, written consent and photographs were obtained. The area was cleaned with isopropyl alcohol. 3.0 mL of 1% lidocaine with epinephrine was injected to obtain adequate anesthesia of the lesion on the right dorsal hand and posterior neck. A 4 mm punch biopsy was performed x2. 4-0 Prolene sutures were utilized to approximate the epidermal edges. White petrolatum ointment and a bandage was applied to the wound. Explicit verbal and written wound care instructions were provided. The patient left the dermatology clinic in good condition.    Shave biopsy x2  After discussion of benefits and risks including but not limited to bleeding, infection, scar, incomplete removal, recurrence, and non-diagnostic biopsy, written consent and photographs were obtained. The area was cleaned with isopropyl alcohol. 3.0 mL of 1%  lidocaine with epinephrine was injected to obtain adequate anesthesia of the lesion on the left helix - superior and left helix - inferior. A shave biopsy was performed x2. Hemostasis was achieved with aluminium chloride and/or electrodesiccation. Petrolatum ointment and a sterile dressing were applied. The patient tolerated the procedure and no complications were noted. The patient was provided with verbal and written post care instructions.

## 2023-09-01 NOTE — NURSING NOTE
Dermatology Rooming Note    Junaid Cormier's goals for this visit include:   Chief Complaint   Patient presents with    Derm Problem     Sean is here today for a punch biopsy     Devon ACHARYA, CMA

## 2023-09-01 NOTE — PROGRESS NOTES
[facility] Dermatology Note    Encounter Date: Sep 1, 2023    Dermatology Problem List:  1. Eczematous dermatitis: may have component of ACD; MF has been a diagnostic consideration in past but biopsies/clinical not supportive  - outside biopsy x2: subacute spongiotic dermatitis  - current therapy: tacrolimus ointment BID, urea 20% cream BID, augmented betamethasone ointment/lotion, tralokinumab  - previous therapy: mycophenolate 1000 mg BID, cetirizine 10/20, dupilumab, mg, desoximetasone/desonide/fluocinonide, nbUVB phototherapy  - in reserve: methotrexate  2. Actinic purpura: due in part to topical steroid use      Punch biopsy x2  After discussion of benefits and risks including but not limited to bleeding, infection, scar, incomplete removal, recurrence, and non-diagnostic biopsy, written consent and photographs were obtained. The area was cleaned with isopropyl alcohol. 3.0 mL of 1% lidocaine with epinephrine was injected to obtain adequate anesthesia of the lesion on the right dorsal hand and posterior neck. A 4 mm punch biopsy was performed x2. 4-0 Prolene sutures were utilized to approximate the epidermal edges. White petrolatum ointment and a bandage was applied to the wound. Explicit verbal and written wound care instructions were provided. The patient left the dermatology clinic in good condition.    Shave biopsy x2  After discussion of benefits and risks including but not limited to bleeding, infection, scar, incomplete removal, recurrence, and non-diagnostic biopsy, written consent and photographs were obtained. The area was cleaned with isopropyl alcohol. 3.0 mL of 1% lidocaine with epinephrine was injected to obtain adequate anesthesia of the lesion on the left helix - superior and left helix - inferior. A shave biopsy was performed x2. Hemostasis was achieved with aluminium chloride and/or electrodesiccation. Petrolatum ointment and a sterile dressing were applied. The patient  tolerated the procedure and no complications were noted. The patient was provided with verbal and written post care instructions.

## 2023-09-01 NOTE — PATIENT INSTRUCTIONS
Wound Care After a Biopsy    What is a skin biopsy?  A skin biopsy allows the doctor to examine a very small piece of tissue under the microscope to determine the diagnosis and the best treatment for the skin condition. A local anesthetic (numbing medicine) is injected with a very small needle into the skin area to be tested. A small piece of skin is taken from the area. Sometimes a suture (stitch) is used.     What are the risks of a skin biopsy?  I will experience scar, bleeding, swelling, pain, crusting and redness. I may experience incomplete removal or recurrence. Risks of this procedure are excessive bleeding, bruising, infection, nerve damage, numbness, thick (hypertrophic or keloidal) scar and non-diagnostic biopsy.    How should I care for my wound for the first 24 hours?  Keep the wound dry and covered for 24 hours  If it bleeds, hold direct pressure on the area for 15 minutes. If bleeding does not stop, call us or go to the emergency room  Avoid strenuous exercise the first 1-2 days or as your doctor instructs you    How should I care for the wound after 24 hours?  After 24 hours, remove the bandage  You may bathe or shower as normal  If you had a scalp biopsy, you can shampoo as usual and can use shower water to clean the biopsy site daily  Clean the wound once a day with gentle soap and water  Do not scrub, be gentle  Apply white petroleum/Vaseline after cleaning the wound with a cotton swab or a clean finger, and keep the site covered with a Bandaid /bandage. Bandages are not necessary with a scalp biopsy  If you are unable to cover the site with a Bandaid /bandage, re-apply ointment 2-3 times a day to keep the site moist. Moisture will help with healing  Avoid strenuous activity for first 1-2 days  Avoid lakes, rivers, pools, and oceans until the stitches are removed or the site is healed    How do I clean my wound?  Wash hands thoroughly with soap or use hand  before all wound care  Clean  the wound with gentle soap and water  Apply white petroleum/Vaseline  to wound after it is clean  Replace the Bandaid /bandage to keep the wound covered for the first few days or as instructed by your doctor  If you had a scalp biopsy, warm shower water to the area on a daily basis should suffice    What should I use to clean my wound?   Cotton-tipped applicators (Qtips )  White petroleum jelly (Vaseline ). Use a clean new container and use Q-tips to apply.  Bandaids  as needed  Gentle soap     How should I care for my wound long term?  Do not get your wound dirty  Keep up with wound care for one week or until the area is healed.  If you have stitches, stitches need to be removed in 14 days. You may return to our clinic for this or you may have it done locally at your doctor s office.  A small scab will form and fall off by itself when the area is completely healed. The area will be red and will become pink in color as it heals. Sun protection is very important for how your scar will turn out. Sunscreen with an SPF 30 or greater is recommended once the area is healed.  You should have some soreness but it should be mild and slowly go away over several days. Talk to your doctor about using tylenol for pain,    When should I call my doctor?  If you have increased:   Pain or swelling  Pus or drainage (clear or slightly yellow drainage is ok)  Temperature over 100F  Spreading redness or warmth around wound    When will I hear about my results?  The biopsy results can take 2 weeks to come back.  Your results will automatically release to [employer] before your provider has even reviewed them.  The clinic will call you with the results, send you a [employer] message, or have you schedule a follow-up clinic or phone time to discuss the results.  Contact our clinics if you do not hear from us in 2 weeks.    Who should I call with questions?  [facility]: [phone]  [facility]  [facility]: [phone]  For urgent needs outside of business hours call the [facility] at [phone] and ask for the dermatology resident on call

## 2023-09-01 NOTE — NURSING NOTE
Lidocaine-epinephrine 1-1:994535 % injection   2.5mL once for one use, starting 9/1/2023 ending 9/1/2023,  2mL disp, R-0, injection  Injected by Devon ACHARYA CMA

## 2023-09-26 NOTE — TELEPHONE ENCOUNTER
[facility] Call Center    Phone Message    May a detailed message be left on voicemail: yes     Reason for Call: Pt calling to speak about biopsy results. Please call [phone]. Thanks!     Action Taken: Other: DERM    Travel Screening: Not Applicable

## 2023-10-05 NOTE — TELEPHONE ENCOUNTER
She stated they will send photos in [facility] for the appointment tomorrow.  Dejah Monterroso

## 2023-10-06 NOTE — PROGRESS NOTES
[facility] Dermatology Note  Encounter Date: Oct 6, 2023  Store-and-Forward and Telephone ([phone]). Location of teledermatologist: [facility] DERMATOLOGY CLINIC [place].  Start time: 11:20. End time: 11:44.    Dermatology Problem List:  1. Eczematous dermatitis: may have component of ACD; MF has been a diagnostic consideration in past but biopsies/clinical not supportive  - outside biopsy x2: subacute spongiotic dermatitis  - current therapy: tacrolimus ointment BID, urea 20% cream BID, augmented betamethasone ointment/lotion, tralokinumab  - restarting dupilumab  - previous therapy: mycophenolate 1000 mg BID, cetirizine 10/20, dupilumab, mg, desoximetasone/desonide/fluocinonide, nbUVB phototherapy  - in reserve: methotrexate, CLARKE inhibitor  2. Actinic purpura: due in part to topical steroid use     ____________________________________________    Assessment & Plan:     Atopic dermatitis: with multiple biopsies without evidence of CTCL. We discussed risks/benefits of next steps in treatment and will plan to switch back to dupilumab. If refractory, would switch to CLARKE inhibitor. Patient/wife will contact pending response to treatment.  - dupilumab  - once start dupilumab, stop tralokinumab  - topicals - augmented betamethasone, urea, tacrolimus    Procedures Performed:    None    Follow-up: 4 months    Staff:     Over 30 minutes was spent during this visit on the date of service, including direct contact time with the patient counseling and coordinating care, review of the medical records, and documentation of diagnosis, natural history, treatment, and prognosis. This excludes time performing any relevant procedures.    Gilberto Virgen MD, FAAD   of Dermatology  Department of Dermatology  [facility]    ____________________________________________    CC: RECHECK (Eczematous dermatitis )    HPI:   Junaid Cormier is a(n) 73  year old male who presents today as a return patient for eczematous dermatitis    Had bad flare a couple weeks ago - very severe itching  - now much better - back has cleared  - cracks on bottoms of feet - like walking on glass - doesn't itch anymore  - using augmented betamethasone and urea  - works better than tacrolimus    Patient is otherwise feeling well, without additional skin concerns.    Labs Reviewed:  9/2023 skin biopsy/TCR without evidence of CTCL    Physical Exam:  Vitals: There were no vitals taken for this visit.  SKIN: Teledermatology photos were reviewed; image quality and interpretability: acceptable. Image date: 10/6/23.  - erythematous scaly patches on the trunk  - erythema and fissuring on the hands  - No other lesions of concern on areas examined.     Medications:  Current Outpatient Medications   Medication    ferrous sulfate (FEROSUL) 325 (65 Fe) MG tablet    folic acid (FOLVITE) 1 MG tablet    tacrolimus (PROTOPIC) 0.1 % external ointment    aspirin (ASA) 81 MG EC tablet    atorvastatin (LIPITOR) 80 MG tablet    augmented betamethasone dipropionate (DIPROLENE) 0.05 % external lotion    augmented betamethasone dipropionate (DIPROLENE-AF) 0.05 % external ointment    cetirizine HCl 10 MG CAPS    desonide (DESOWEN) 0.05 % external ointment    fexofenadine (ALLEGRA) 180 MG tablet    hydrOXYzine (ATARAX) 25 MG tablet    lisinopril (ZESTRIL) 20 MG tablet    tamsulosin (FLOMAX) 0.4 MG capsule    Tralokinumab-ldrm 150 MG/ML SOSY    urea (GORMEL) 20 % external cream     Current Facility-Administered Medications   Medication    lidocaine 1% with EPINEPHrine 1:100,000 injection 3 mL      Past Medical/Surgical History:   Patient Active Problem List   Diagnosis    Intrinsic atopic dermatitis     History reviewed. No pertinent past medical history.    CC No referring provider defined for this encounter. on close of this encounter.

## 2023-10-06 NOTE — NURSING NOTE
Teledermatology Nurse Call Patients:     Are you in the [facility] at the time of the encounter? Yes     Today's visit will be billed to you and your insurance.    A teledermatology visit is not as thorough as an in-person visit and the quality of the photograph sent may not be of the same quality as that taken by the dermatology clinic.    Pt is starting to get cracks on his feet- pt states pain level 2- wife says she thinks its more of a 5, but pt says he is getting used to the pain.       Brissa Lieberman on 10/6/2023 at 11:17 AM

## 2023-10-06 NOTE — PROGRESS NOTES
[facility] Dermatology Note    Encounter Date: Oct 6, 2023    Dermatology Problem List:  1. Eczematous dermatitis: may have component of ACD; MF has been a diagnostic consideration in past but biopsies/clinical not supportive  - outside biopsy x2: subacute spongiotic dermatitis  - current therapy: tacrolimus ointment BID, urea 20% cream BID, augmented betamethasone ointment/lotion, tralokinumab  - previous therapy: mycophenolate 1000 mg BID, cetirizine 10/20, dupilumab, mg, desoximetasone/desonide/fluocinonide, nbUVB phototherapy  - in reserve: methotrexate  2. Actinic purpura: due in part to topical steroid use     ______________________________________    Impression/Plan:  1.         Follow-up in ***.       Staff Involved:  Staff Only    I, TIM CAMEJO, am serving as a scribe; to document services personally performed by Gilberto Virgen MD -based on data collection and the provider's statements to me.    ***      CC:   No chief complaint on file.      History of Present Illness:  Mr. Junaid Cormier is a 73 year old male who presents as a return patient.        Labs:  N/A    Physical exam:  Vitals: There were no vitals taken for this visit.  GEN: This is a well developed, well-nourished male in no acute distress, in a pleasant mood.    SKIN: Foreman phototype ***  - {Skin Exam:227976}  -   -  -  - No other lesions of concern on areas examined.     Past Medical History:   No past medical history on file.  No past surgical history on file.    Social History:   reports that he has quit smoking. He has quit using smokeless tobacco.    Family History:  No family history on file.    Medications:  Current Outpatient Medications   Medication Sig Dispense Refill    aspirin (ASA) 81 MG EC tablet Take 81 mg by mouth      atorvastatin (LIPITOR) 80 MG tablet       augmented betamethasone dipropionate (DIPROLENE) 0.05 % external lotion JEREMY EXT TO THE SCALP BID PRN 60 mL 11    augmented  betamethasone dipropionate (DIPROLENE-AF) 0.05 % external ointment Apply topically 2 times daily 100 g 11    cetirizine HCl 10 MG CAPS  (Patient not taking: Reported on 12/16/2022)      desonide (DESOWEN) 0.05 % external ointment APPLY TOPICALLY TO THE AFFECTED AREA ON FACE AND SCALP TWICE DAILY AS NEEDED 60 g 3    fexofenadine (ALLEGRA) 180 MG tablet Take 1 tablet (180 mg) by mouth 2 times daily 180 tablet 3    hydrOXYzine (ATARAX) 25 MG tablet Take 1 tablet (25 mg) by mouth nightly as needed for itching 30 tablet 3    lisinopril (ZESTRIL) 20 MG tablet Take 30 mg by mouth      tacrolimus (PROTOPIC) 0.1 % external ointment Apply twice daily to areas of eczema 100 g 3    tamsulosin (FLOMAX) 0.4 MG capsule Take 0.4 mg by mouth      Tralokinumab-ldrm 150 MG/ML SOSY Inject 300 mg Subcutaneous every 14 days Maintenance dose after initial 600mg loading dose 4 mL 8    urea (GORMEL) 20 % external cream Apply twice daily to areas of flaking/scaling. 480g is one month supply (Patient not taking: Reported on 12/16/2022) 480 g 11     No Known Allergies

## 2023-10-06 NOTE — LETTER
10/6/2023       RE: Junaid Cormier  [address] [place] [address]     Dear Colleague,    Thank you for referring your patient, Junaid Cormier, to the [facility] DERMATOLOGY CLINIC [place] at [facility]. Please see a copy of my visit note below.    [facility] Dermatology Note  Encounter Date: Oct 6, 2023  Store-and-Forward and Telephone ([phone]). Location of teledermatologist: [facility] DERMATOLOGY CLINIC [place].  Start time: 11:20. End time: 11:44.    Dermatology Problem List:  1. Eczematous dermatitis: may have component of ACD; MF has been a diagnostic consideration in past but biopsies/clinical not supportive  - outside biopsy x2: subacute spongiotic dermatitis  - current therapy: tacrolimus ointment BID, urea 20% cream BID, augmented betamethasone ointment/lotion, tralokinumab  - restarting dupilumab  - previous therapy: mycophenolate 1000 mg BID, cetirizine 10/20, dupilumab, mg, desoximetasone/desonide/fluocinonide, nbUVB phototherapy  - in reserve: methotrexate, CLARKE inhibitor  2. Actinic purpura: due in part to topical steroid use     ____________________________________________    Assessment & Plan:     Atopic dermatitis: with multiple biopsies without evidence of CTCL. We discussed risks/benefits of next steps in treatment and will plan to switch back to dupilumab. If refractory, would switch to CLARKE inhibitor. Patient/wife will contact pending response to treatment.  - dupilumab  - once start dupilumab, stop tralokinumab  - topicals - augmented betamethasone, urea, tacrolimus    Procedures Performed:    None    Follow-up: 4 months    Staff:     Over 30 minutes was spent during this visit on the date of service, including direct contact time with the patient counseling and coordinating care, review of the medical records, and documentation of diagnosis, natural history, treatment, and prognosis.  This excludes time performing any relevant procedures.    Gilberto Virgen MD, FAAD   of Dermatology  Department of Dermatology  [facility]    ____________________________________________    CC: RECHECK (Eczematous dermatitis )    HPI:  Mr. Junaid Cormier is a(n) 73 year old male who presents today as a return patient for eczematous dermatitis    Had bad flare a couple weeks ago - very severe itching  - now much better - back has cleared  - cracks on bottoms of feet - like walking on glass - doesn't itch anymore  - using augmented betamethasone and urea  - works better than tacrolimus    Patient is otherwise feeling well, without additional skin concerns.    Labs Reviewed:  9/2023 skin biopsy/TCR without evidence of CTCL    Physical Exam:  Vitals: There were no vitals taken for this visit.  SKIN: Teledermatology photos were reviewed; image quality and interpretability: acceptable. Image date: 10/6/23.  - erythematous scaly patches on the trunk  - erythema and fissuring on the hands  - No other lesions of concern on areas examined.     Medications:  Current Outpatient Medications   Medication    ferrous sulfate (FEROSUL) 325 (65 Fe) MG tablet    folic acid (FOLVITE) 1 MG tablet    tacrolimus (PROTOPIC) 0.1 % external ointment    aspirin (ASA) 81 MG EC tablet    atorvastatin (LIPITOR) 80 MG tablet    augmented betamethasone dipropionate (DIPROLENE) 0.05 % external lotion    augmented betamethasone dipropionate (DIPROLENE-AF) 0.05 % external ointment    cetirizine HCl 10 MG CAPS    desonide (DESOWEN) 0.05 % external ointment    fexofenadine (ALLEGRA) 180 MG tablet    hydrOXYzine (ATARAX) 25 MG tablet    lisinopril (ZESTRIL) 20 MG tablet    tamsulosin (FLOMAX) 0.4 MG capsule    Tralokinumab-ldrm 150 MG/ML SOSY    urea (GORMEL) 20 % external cream     Current Facility-Administered Medications   Medication    lidocaine 1% with EPINEPHrine 1:100,000 injection 3 mL       Past Medical/Surgical History:   Patient Active Problem List   Diagnosis    Intrinsic atopic dermatitis     History reviewed. No pertinent past medical history.    CC No referring provider defined for this encounter. on close of this encounter.

## 2023-10-06 NOTE — PROGRESS NOTES
[facility] Dermatology Note  Encounter Date: Oct 6, 2023  Store-and-Forward and Telephone ([phone] ). Location of teledermatologist: [facility] DERMATOLOGY CLINIC [place].  Start time: ***. End time: ***.    Dermatology Problem List:  1. ***    ____________________________________________    Assessment & Plan:     # {Diagnosesderm:177279}.   {kkplans:720329}   - ***     # {Diagnosesderm:267508}.   {kkplans:729766}   - ***     Procedures Performed:    None    Follow-up: {kkfollowup:014724}    {kkstaffinvolved:354698}    ***  ____________________________________________    CC: RECHECK (Eczematous dermatitis )    HPI:  Mr. Junaid Cormier is a(n) 73 year old male who presents today {kknew/return:255477} for ***    Patient is otherwise feeling well, without additional skin concerns.    Labs Reviewed:  ***N/A    Physical Exam:  Vitals: There were no vitals taken for this visit.  SKIN: Teledermatology photos were reviewed; image quality and interpretability: ***acceptable. Image date: ***.  - ***  - No other lesions of concern on areas examined.     Medications:  Current Outpatient Medications   Medication    ferrous sulfate (FEROSUL) 325 (65 Fe) MG tablet    folic acid (FOLVITE) 1 MG tablet    tacrolimus (PROTOPIC) 0.1 % external ointment    aspirin (ASA) 81 MG EC tablet    atorvastatin (LIPITOR) 80 MG tablet    augmented betamethasone dipropionate (DIPROLENE) 0.05 % external lotion    augmented betamethasone dipropionate (DIPROLENE-AF) 0.05 % external ointment    cetirizine HCl 10 MG CAPS    desonide (DESOWEN) 0.05 % external ointment    fexofenadine (ALLEGRA) 180 MG tablet    hydrOXYzine (ATARAX) 25 MG tablet    lisinopril (ZESTRIL) 20 MG tablet    tamsulosin (FLOMAX) 0.4 MG capsule    Tralokinumab-ldrm 150 MG/ML SOSY    urea (GORMEL) 20 % external cream     Current Facility-Administered Medications   Medication    lidocaine 1% with EPINEPHrine 1:100,000 injection 3 mL      Past  Medical/Surgical History:   Patient Active Problem List   Diagnosis    Intrinsic atopic dermatitis     No past medical history on file.    CC No referring provider defined for this encounter. on close of this encounter.

## 2023-10-09 NOTE — TELEPHONE ENCOUNTER
[facility] Call Center    Phone Message    May a detailed message be left on voicemail: yes     Reason for Call: Patient's SO is calling to schedule pt for February phone appt - writer unable to schedule before May 2024. Please call back [phone] Thank you    Action Taken: Message routed to:  [facility] Derm    Travel Screening: Not Applicable

## 2023-10-17 NOTE — TELEPHONE ENCOUNTER
desonide (DESOWEN) 0.05 % external ointment   Last Written Prescription Date:   4/20/2023  Last Fill Quantity: 60,   # refills: 3  Last Office Visit :  10/6/2023  Future Office visit:  2/16/2023    Routing refill request to provider for review/approval because:  Refer to Provider for review and refills per Providers orders for Pt cares.     Christa Boswell RN  Central Triage Red Flags/Med Refills

## 2023-10-17 NOTE — TELEPHONE ENCOUNTER
Via phone patient was scheduled for the following    Appointment type: Phone return    Provider: Dr. Virgen    Return date: 02-16-24    Specialty phone number: [phone]

## 2023-10-26 NOTE — TELEPHONE ENCOUNTER
FREE DRUG APPLICATION INITIATED    Medication: DUPIXENT 300 MG/2ML SC SOPN  Free Drug Program Name:  [employer]  Date Submitted: 10/25/2023 11:35 AM  Phone #: [phone]   Fax #: [phone]   Additional Information: faxed provider portion

## 2023-11-01 NOTE — TELEPHONE ENCOUNTER
Spoke with [employer] and they confirmed they have received all documentation and are currently reviewing application they will reach out if additional information is needed

## 2023-11-03 NOTE — TELEPHONE ENCOUNTER
Spoke with [employer] and application is still being reviewed. They will reach out if anything else is needed

## 2023-11-09 NOTE — TELEPHONE ENCOUNTER
Spoke with [employer] and they asked that we refax over page one of application. Refaxed again today

## 2023-11-15 NOTE — TELEPHONE ENCOUNTER
Spoke with [employer] and they have received missing information. They are currently reviewing application and will reach out if anything else is needed

## 2023-11-20 NOTE — TELEPHONE ENCOUNTER
Fred from [employer] called - Patient could be assessed for patient assistaince program but she needs Page 2 with Dr signature and complete RX and copy of  PA - Please notify patient to call in reporting financial hardship - Ph# [phone] Fax# [phone] Thank you

## 2023-11-21 NOTE — TELEPHONE ENCOUNTER
Faxed over page 2 to [employer] again along with approval letter. Also sent my chart message to follow up with [employer] about financial hardship.

## 2023-11-24 NOTE — TELEPHONE ENCOUNTER
Tried to call [employer] to check on application but they are closed for the holiday. Will check back next week

## 2023-11-28 NOTE — TELEPHONE ENCOUNTER
Spoke with [employer] and they have confirmed that have received provider portion. They will review the application and reach out after this is complete

## 2023-11-28 NOTE — TELEPHONE ENCOUNTER
[employer] states they need provider portion resent. Refaxed and submitted in patient document drop again today.

## 2023-11-29 NOTE — TELEPHONE ENCOUNTER
FREE DRUG APPLICATION APPROVED    Medication: DUPIXENT 300 MG/2ML SC SOPN  Program Name:  [employer]  Effective Date: 11/28/2023  Expiration Date: 12/31/2023  [facility]  Patient Notified: yes  Additional Information:

## 2023-12-12 NOTE — TELEPHONE ENCOUNTER
FREE DRUG APPLICATION INITIATED    Medication: DUPIXENT 300 MG/2ML SC SOPN  Free Drug Program Name:  [employer]  Date Submitted: 12/12/2023  7:50 AM  Phone #: [phone]  Fax #: [phone]  Additional Information:

## 2024-01-01 ENCOUNTER — TRANSCRIBE ORDERS (OUTPATIENT)
Dept: OTHER | Age: 74
End: 2024-01-01

## 2024-01-01 ENCOUNTER — OFFICE VISIT (OUTPATIENT)
Dept: DERMATOLOGY | Facility: CLINIC | Age: 74
End: 2024-01-01
Payer: MEDICARE

## 2024-01-01 ENCOUNTER — LAB (OUTPATIENT)
Dept: LAB | Facility: CLINIC | Age: 74
End: 2024-01-01
Payer: MEDICARE

## 2024-01-01 ENCOUNTER — HOSPITAL ENCOUNTER (OUTPATIENT)
Dept: CARDIAC REHAB | Facility: CLINIC | Age: 74
Discharge: HOME | End: 2024-06-13
Attending: INTERNAL MEDICINE
Payer: MEDICARE

## 2024-01-01 ENCOUNTER — HOSPITAL ENCOUNTER (OUTPATIENT)
Dept: CARDIAC REHAB | Facility: CLINIC | Age: 74
Discharge: HOME | End: 2024-05-16
Attending: INTERNAL MEDICINE
Payer: MEDICARE

## 2024-01-01 ENCOUNTER — TELEPHONE (OUTPATIENT)
Dept: DERMATOLOGY | Facility: CLINIC | Age: 74
End: 2024-01-01

## 2024-01-01 ENCOUNTER — APPOINTMENT (OUTPATIENT)
Dept: RADIOLOGY | Facility: CLINIC | Age: 74
DRG: 291 | End: 2024-01-01
Attending: STUDENT IN AN ORGANIZED HEALTH CARE EDUCATION/TRAINING PROGRAM
Payer: MEDICARE

## 2024-01-01 ENCOUNTER — MYC MEDICAL ADVICE (OUTPATIENT)
Dept: DERMATOLOGY | Facility: CLINIC | Age: 74
End: 2024-01-01
Payer: MEDICARE

## 2024-01-01 ENCOUNTER — HOSPITAL ENCOUNTER (OUTPATIENT)
Dept: CARDIAC REHAB | Facility: CLINIC | Age: 74
Discharge: HOME | End: 2024-06-20
Attending: INTERNAL MEDICINE
Payer: MEDICARE

## 2024-01-01 ENCOUNTER — APPOINTMENT (OUTPATIENT)
Dept: OCCUPATIONAL THERAPY | Facility: CLINIC | Age: 74
DRG: 291 | End: 2024-01-01
Attending: INTERNAL MEDICINE
Payer: MEDICARE

## 2024-01-01 ENCOUNTER — APPOINTMENT (OUTPATIENT)
Dept: LAB | Facility: CLINIC | Age: 74
End: 2024-01-01
Payer: MEDICARE

## 2024-01-01 ENCOUNTER — HOSPITAL ENCOUNTER (OUTPATIENT)
Dept: CARDIAC REHAB | Facility: CLINIC | Age: 74
Discharge: HOME | End: 2024-06-03
Attending: INTERNAL MEDICINE
Payer: MEDICARE

## 2024-01-01 ENCOUNTER — HOSPITAL ENCOUNTER (OUTPATIENT)
Dept: CARDIAC REHAB | Facility: CLINIC | Age: 74
Discharge: HOME | End: 2024-06-10
Attending: INTERNAL MEDICINE
Payer: MEDICARE

## 2024-01-01 ENCOUNTER — HOSPITAL ENCOUNTER (OUTPATIENT)
Dept: CARDIAC REHAB | Facility: CLINIC | Age: 74
Discharge: HOME | End: 2024-05-28
Attending: INTERNAL MEDICINE
Payer: MEDICARE

## 2024-01-01 ENCOUNTER — HOSPITAL ENCOUNTER (OUTPATIENT)
Dept: CARDIAC REHAB | Facility: CLINIC | Age: 74
Discharge: HOME | End: 2024-05-10
Attending: INTERNAL MEDICINE
Payer: MEDICARE

## 2024-01-01 ENCOUNTER — LAB REQUISITION (OUTPATIENT)
Dept: LAB | Facility: CLINIC | Age: 74
End: 2024-01-01
Payer: MEDICARE

## 2024-01-01 ENCOUNTER — VIRTUAL VISIT (OUTPATIENT)
Dept: DERMATOLOGY | Facility: CLINIC | Age: 74
End: 2024-01-01
Payer: MEDICARE

## 2024-01-01 ENCOUNTER — PATIENT OUTREACH (OUTPATIENT)
Dept: CARE COORDINATION | Facility: CLINIC | Age: 74
End: 2024-01-01
Payer: MEDICARE

## 2024-01-01 ENCOUNTER — APPOINTMENT (OUTPATIENT)
Dept: CT IMAGING | Facility: CLINIC | Age: 74
DRG: 291 | End: 2024-01-01
Attending: STUDENT IN AN ORGANIZED HEALTH CARE EDUCATION/TRAINING PROGRAM
Payer: MEDICARE

## 2024-01-01 ENCOUNTER — HOSPITAL ENCOUNTER (INPATIENT)
Facility: CLINIC | Age: 74
LOS: 1 days | Discharge: HOME | DRG: 291 | End: 2024-03-26
Attending: STUDENT IN AN ORGANIZED HEALTH CARE EDUCATION/TRAINING PROGRAM | Admitting: INTERNAL MEDICINE
Payer: MEDICARE

## 2024-01-01 ENCOUNTER — HOSPITAL ENCOUNTER (OUTPATIENT)
Dept: CARDIAC REHAB | Facility: CLINIC | Age: 74
Discharge: HOME | End: 2024-05-13
Attending: INTERNAL MEDICINE
Payer: MEDICARE

## 2024-01-01 ENCOUNTER — HOSPITAL ENCOUNTER (OUTPATIENT)
Dept: CARDIAC REHAB | Facility: CLINIC | Age: 74
Discharge: HOME | End: 2024-06-17
Attending: INTERNAL MEDICINE
Payer: MEDICARE

## 2024-01-01 ENCOUNTER — HOSPITAL ENCOUNTER (OUTPATIENT)
Dept: CARDIAC REHAB | Facility: CLINIC | Age: 74
Discharge: HOME | End: 2024-05-30
Attending: INTERNAL MEDICINE
Payer: MEDICARE

## 2024-01-01 ENCOUNTER — APPOINTMENT (OUTPATIENT)
Dept: CARDIOLOGY | Facility: CLINIC | Age: 74
DRG: 291 | End: 2024-01-01
Attending: INTERNAL MEDICINE
Payer: MEDICARE

## 2024-01-01 ENCOUNTER — APPOINTMENT (OUTPATIENT)
Dept: OCCUPATIONAL THERAPY | Facility: CLINIC | Age: 74
DRG: 291 | End: 2024-01-01
Payer: MEDICARE

## 2024-01-01 ENCOUNTER — HOSPITAL ENCOUNTER (OUTPATIENT)
Dept: CARDIAC REHAB | Facility: CLINIC | Age: 74
Discharge: HOME | End: 2024-05-23
Attending: INTERNAL MEDICINE
Payer: MEDICARE

## 2024-01-01 ENCOUNTER — HOSPITAL ENCOUNTER (OUTPATIENT)
Dept: CARDIAC REHAB | Facility: CLINIC | Age: 74
Discharge: HOME | End: 2024-05-20
Attending: INTERNAL MEDICINE
Payer: MEDICARE

## 2024-01-01 VITALS
WEIGHT: 182.2 LBS | HEART RATE: 71 BPM | DIASTOLIC BLOOD PRESSURE: 52 MMHG | SYSTOLIC BLOOD PRESSURE: 95 MMHG | TEMPERATURE: 98.1 F | RESPIRATION RATE: 16 BRPM | OXYGEN SATURATION: 94 % | BODY MASS INDEX: 25.51 KG/M2 | HEIGHT: 71 IN

## 2024-01-01 DIAGNOSIS — Z95.820 S/P ANGIOPLASTY WITH STENT: Primary | ICD-10-CM

## 2024-01-01 DIAGNOSIS — I10 ESSENTIAL HYPERTENSION: ICD-10-CM

## 2024-01-01 DIAGNOSIS — C84.10 SEZARY'S DISEASE, UNSPECIFIED BODY REGION (H): ICD-10-CM

## 2024-01-01 DIAGNOSIS — L20.84 INTRINSIC ATOPIC DERMATITIS: ICD-10-CM

## 2024-01-01 DIAGNOSIS — D49.2 NEOPLASM OF UNSPECIFIED BEHAVIOR OF BONE, SOFT TISSUE, AND SKIN: ICD-10-CM

## 2024-01-01 DIAGNOSIS — J18.9 COMMUNITY ACQUIRED PNEUMONIA, UNSPECIFIED LATERALITY: Primary | ICD-10-CM

## 2024-01-01 DIAGNOSIS — R00.2 PALPITATIONS: ICD-10-CM

## 2024-01-01 DIAGNOSIS — R06.02 SHORTNESS OF BREATH: ICD-10-CM

## 2024-01-01 DIAGNOSIS — L30.9 DERMATITIS: ICD-10-CM

## 2024-01-01 DIAGNOSIS — L20.84 INTRINSIC ATOPIC DERMATITIS: Primary | ICD-10-CM

## 2024-01-01 DIAGNOSIS — D84.9 IMMUNOSUPPRESSION (H): ICD-10-CM

## 2024-01-01 DIAGNOSIS — C95.90 LEUKEMIA NOT HAVING ACHIEVED REMISSION, UNSPECIFIED LEUKEMIA TYPE (H): ICD-10-CM

## 2024-01-01 DIAGNOSIS — L82.0 INFLAMED SEBORRHEIC KERATOSIS: ICD-10-CM

## 2024-01-01 DIAGNOSIS — D49.2 NEOPLASM OF UNSPECIFIED BEHAVIOR OF BONE, SOFT TISSUE, AND SKIN: Primary | ICD-10-CM

## 2024-01-01 DIAGNOSIS — M25.562 PAIN IN LEFT KNEE: ICD-10-CM

## 2024-01-01 DIAGNOSIS — L29.9 PRURITUS: ICD-10-CM

## 2024-01-01 DIAGNOSIS — L70.8 OTHER ACNE: ICD-10-CM

## 2024-01-01 DIAGNOSIS — I25.10 CAD (CORONARY ARTERY DISEASE): ICD-10-CM

## 2024-01-01 DIAGNOSIS — Z51.81 THERAPEUTIC DRUG MONITORING: ICD-10-CM

## 2024-01-01 LAB
ALBUMIN SERPL BCG-MCNC: 3.7 G/DL (ref 3.5–5.2)
ALBUMIN SERPL BCG-MCNC: 3.9 G/DL (ref 3.5–5.2)
ALBUMIN SERPL BCG-MCNC: 4.1 G/DL (ref 3.5–5.2)
ALBUMIN UR-MCNC: NEGATIVE MG/DL
ALP SERPL-CCNC: 109 U/L (ref 40–150)
ALP SERPL-CCNC: 122 U/L (ref 40–150)
ALP SERPL-CCNC: 128 U/L (ref 40–150)
ALT SERPL W P-5'-P-CCNC: 10 U/L (ref 0–70)
ALT SERPL W P-5'-P-CCNC: 22 U/L (ref 0–70)
ALT SERPL W P-5'-P-CCNC: 5 U/L (ref 0–70)
ANION GAP SERPL CALCULATED.3IONS-SCNC: 11 MMOL/L (ref 7–15)
ANION GAP SERPL CALCULATED.3IONS-SCNC: 11 MMOL/L (ref 7–15)
ANION GAP SERPL CALCULATED.3IONS-SCNC: 12 MMOL/L (ref 7–15)
ANION GAP SERPL CALCULATED.3IONS-SCNC: 9 MMOL/L (ref 7–15)
ANNOTATION COMMENT IMP: NORMAL
APPEARANCE FLD: ABNORMAL
APPEARANCE UR: CLEAR
AST SERPL W P-5'-P-CCNC: 18 U/L (ref 0–45)
AST SERPL W P-5'-P-CCNC: 20 U/L (ref 0–45)
AST SERPL W P-5'-P-CCNC: 32 U/L (ref 0–45)
ATRIAL RATE - MUSE: 69 BPM
BACTERIA BLD CULT: NO GROWTH
BACTERIA BLD CULT: NO GROWTH
BACTERIA SNV CULT: NO GROWTH
BACTERIA SNV CULT: NORMAL
BASOPHILS # BLD AUTO: 0 10E3/UL (ref 0–0.2)
BASOPHILS # BLD AUTO: 0.1 10E3/UL (ref 0–0.2)
BASOPHILS # BLD AUTO: 0.1 10E3/UL (ref 0–0.2)
BASOPHILS NFR BLD AUTO: 0 %
BASOPHILS NFR BLD AUTO: 1 %
BASOPHILS NFR BLD AUTO: 1 %
BILIRUB SERPL-MCNC: 0.3 MG/DL
BILIRUB SERPL-MCNC: 0.4 MG/DL
BILIRUB SERPL-MCNC: 0.4 MG/DL
BILIRUB UR QL STRIP: NEGATIVE
BUN SERPL-MCNC: 23.1 MG/DL (ref 8–23)
BUN SERPL-MCNC: 23.8 MG/DL (ref 8–23)
BUN SERPL-MCNC: 25.5 MG/DL (ref 8–23)
BUN SERPL-MCNC: 34.1 MG/DL (ref 8–23)
C PNEUM DNA SPEC QL NAA+PROBE: NOT DETECTED
CALCIUM SERPL-MCNC: 8.7 MG/DL (ref 8.8–10.2)
CALCIUM SERPL-MCNC: 9 MG/DL (ref 8.8–10.2)
CALCIUM SERPL-MCNC: 9.2 MG/DL (ref 8.8–10.2)
CALCIUM SERPL-MCNC: 9.3 MG/DL (ref 8.8–10.2)
CELL COUNT BODY FLUID SOURCE: ABNORMAL
CHLORIDE SERPL-SCNC: 103 MMOL/L (ref 98–107)
CHLORIDE SERPL-SCNC: 104 MMOL/L (ref 98–107)
COLOR FLD: YELLOW
COLOR UR AUTO: COLORLESS
CREAT SERPL-MCNC: 0.88 MG/DL (ref 0.67–1.17)
CREAT SERPL-MCNC: 0.97 MG/DL (ref 0.67–1.17)
CREAT SERPL-MCNC: 0.99 MG/DL (ref 0.67–1.17)
CREAT SERPL-MCNC: 1.3 MG/DL (ref 0.67–1.17)
CRYSTALS SNV MICRO: NORMAL
DEPRECATED HCO3 PLAS-SCNC: 22 MMOL/L (ref 22–29)
DEPRECATED HCO3 PLAS-SCNC: 23 MMOL/L (ref 22–29)
DEPRECATED HCO3 PLAS-SCNC: 23 MMOL/L (ref 22–29)
DEPRECATED HCO3 PLAS-SCNC: 25 MMOL/L (ref 22–29)
DIASTOLIC BLOOD PRESSURE - MUSE: 62 MMHG
EGFRCR SERPLBLD CKD-EPI 2021: 59 ML/MIN/1.73M2
EGFRCR SERPLBLD CKD-EPI 2021: 81 ML/MIN/1.73M2
EGFRCR SERPLBLD CKD-EPI 2021: 83 ML/MIN/1.73M2
EGFRCR SERPLBLD CKD-EPI 2021: >90 ML/MIN/1.73M2
EOSINOPHIL # BLD AUTO: 0.9 10E3/UL (ref 0–0.7)
EOSINOPHIL # BLD AUTO: 1 10E3/UL (ref 0–0.7)
EOSINOPHIL # BLD AUTO: 2 10E3/UL (ref 0–0.7)
EOSINOPHIL NFR BLD AUTO: 10 %
EOSINOPHIL NFR BLD AUTO: 12 %
EOSINOPHIL NFR BLD AUTO: 23 %
ERYTHROCYTE [DISTWIDTH] IN BLOOD BY AUTOMATED COUNT: 15.2 % (ref 10–15)
ERYTHROCYTE [DISTWIDTH] IN BLOOD BY AUTOMATED COUNT: 17.3 % (ref 10–15)
ERYTHROCYTE [DISTWIDTH] IN BLOOD BY AUTOMATED COUNT: 17.5 % (ref 10–15)
ERYTHROCYTE [DISTWIDTH] IN BLOOD BY AUTOMATED COUNT: 17.7 % (ref 10–15)
FLUAV H1 2009 PAND RNA SPEC QL NAA+PROBE: NOT DETECTED
FLUAV H1 RNA SPEC QL NAA+PROBE: NOT DETECTED
FLUAV H3 RNA SPEC QL NAA+PROBE: NOT DETECTED
FLUAV RNA SPEC QL NAA+PROBE: NEGATIVE
FLUAV RNA SPEC QL NAA+PROBE: NOT DETECTED
FLUBV RNA RESP QL NAA+PROBE: NEGATIVE
FLUBV RNA SPEC QL NAA+PROBE: NOT DETECTED
GLUCOSE SERPL-MCNC: 112 MG/DL (ref 70–99)
GLUCOSE SERPL-MCNC: 124 MG/DL (ref 70–99)
GLUCOSE SERPL-MCNC: 165 MG/DL (ref 70–99)
GLUCOSE SERPL-MCNC: 99 MG/DL (ref 70–99)
GLUCOSE UR STRIP-MCNC: NEGATIVE MG/DL
GRAM STAIN RESULT: NORMAL
GRAM STAIN RESULT: NORMAL
HADV DNA SPEC QL NAA+PROBE: NOT DETECTED
HCOV PNL SPEC NAA+PROBE: NOT DETECTED
HCT VFR BLD AUTO: 24.8 % (ref 40–53)
HCT VFR BLD AUTO: 25.1 % (ref 40–53)
HCT VFR BLD AUTO: 26.1 % (ref 40–53)
HCT VFR BLD AUTO: 38.3 % (ref 40–53)
HGB BLD-MCNC: 12.5 G/DL (ref 13.3–17.7)
HGB BLD-MCNC: 7.8 G/DL (ref 13.3–17.7)
HGB BLD-MCNC: 7.9 G/DL (ref 13.3–17.7)
HGB BLD-MCNC: 8.2 G/DL (ref 13.3–17.7)
HGB UR QL STRIP: NEGATIVE
HMPV RNA SPEC QL NAA+PROBE: NOT DETECTED
HOLD SPECIMEN: NORMAL
HOLD SPECIMEN: NORMAL
HPIV1 RNA SPEC QL NAA+PROBE: NOT DETECTED
HPIV2 RNA SPEC QL NAA+PROBE: NOT DETECTED
HPIV3 RNA SPEC QL NAA+PROBE: NOT DETECTED
HPIV4 RNA SPEC QL NAA+PROBE: NOT DETECTED
ICS IGG SER IF-IMP: NORMAL
IMM GRANULOCYTES # BLD: 0 10E3/UL
IMM GRANULOCYTES NFR BLD: 0 %
IMM GRANULOCYTES NFR BLD: 0 %
IMM GRANULOCYTES NFR BLD: 1 %
INTERPRETATION ECG - MUSE: NORMAL
KETONES UR STRIP-MCNC: NEGATIVE MG/DL
L PNEUMO1 AG UR QL IA: NEGATIVE
LAB DIRECTOR COMMENTS: NORMAL
LAB DIRECTOR DISCLAIMER: NORMAL
LAB DIRECTOR INTERPRETATION: NORMAL
LAB DIRECTOR METHODOLOGY: NORMAL
LAB DIRECTOR RESULTS: NORMAL
LDH SERPL L TO P-CCNC: 399 U/L (ref 0–250)
LEUKOCYTE ESTERASE UR QL STRIP: NEGATIVE
LVEF ECHO: NORMAL
LYMPHOCYTES # BLD AUTO: 0.8 10E3/UL (ref 0.8–5.3)
LYMPHOCYTES # BLD AUTO: 0.9 10E3/UL (ref 0.8–5.3)
LYMPHOCYTES # BLD AUTO: 1 10E3/UL (ref 0.8–5.3)
LYMPHOCYTES NFR BLD AUTO: 11 %
LYMPHOCYTES NFR BLD AUTO: 13 %
LYMPHOCYTES NFR BLD AUTO: 9 %
LYMPHOCYTES NFR FLD MANUAL: 0 %
M PNEUMO DNA SPEC QL NAA+PROBE: NOT DETECTED
MAGNESIUM SERPL-MCNC: 1.7 MG/DL (ref 1.7–2.3)
MAGNESIUM SERPL-MCNC: 1.8 MG/DL (ref 1.7–2.3)
MAGNESIUM SERPL-MCNC: 1.8 MG/DL (ref 1.7–2.3)
MCH RBC QN AUTO: 29.2 PG (ref 26.5–33)
MCH RBC QN AUTO: 29.2 PG (ref 26.5–33)
MCH RBC QN AUTO: 29.5 PG (ref 26.5–33)
MCH RBC QN AUTO: 30.5 PG (ref 26.5–33)
MCHC RBC AUTO-ENTMCNC: 31.4 G/DL (ref 31.5–36.5)
MCHC RBC AUTO-ENTMCNC: 31.5 G/DL (ref 31.5–36.5)
MCHC RBC AUTO-ENTMCNC: 31.5 G/DL (ref 31.5–36.5)
MCHC RBC AUTO-ENTMCNC: 32.6 G/DL (ref 31.5–36.5)
MCV RBC AUTO: 93 FL (ref 78–100)
MCV RBC AUTO: 94 FL (ref 78–100)
MONOCYTES # BLD AUTO: 0.4 10E3/UL (ref 0–1.3)
MONOCYTES # BLD AUTO: 0.5 10E3/UL (ref 0–1.3)
MONOCYTES # BLD AUTO: 0.5 10E3/UL (ref 0–1.3)
MONOCYTES NFR BLD AUTO: 5 %
MONOCYTES NFR BLD AUTO: 6 %
MONOCYTES NFR BLD AUTO: 6 %
MONOS+MACROS NFR FLD MANUAL: 10 %
MRSA DNA SPEC QL NAA+PROBE: NEGATIVE
NEUTROPHILS # BLD AUTO: 5.3 10E3/UL (ref 1.6–8.3)
NEUTROPHILS # BLD AUTO: 5.6 10E3/UL (ref 1.6–8.3)
NEUTROPHILS # BLD AUTO: 6.1 10E3/UL (ref 1.6–8.3)
NEUTROPHILS NFR BLD AUTO: 60 %
NEUTROPHILS NFR BLD AUTO: 70 %
NEUTROPHILS NFR BLD AUTO: 72 %
NEUTS BAND NFR FLD MANUAL: 90 %
NITRATE UR QL: NEGATIVE
NRBC # BLD AUTO: 0 10E3/UL
NRBC BLD AUTO-RTO: 0 /100
NT-PROBNP SERPL-MCNC: 3746 PG/ML (ref 0–900)
P AXIS - MUSE: 31 DEGREES
PATH REPORT.COMMENTS IMP SPEC: NORMAL
PATH REPORT.FINAL DX SPEC: NORMAL
PATH REPORT.GROSS SPEC: NORMAL
PATH REPORT.MICROSCOPIC SPEC OTHER STN: NORMAL
PATH REPORT.RELEVANT HX SPEC: NORMAL
PATHOLOGY STUDY: NORMAL
PATHOLOGY STUDY: NORMAL
PH UR STRIP: 5 [PH] (ref 5–7)
PLATELET # BLD AUTO: 173 10E3/UL (ref 150–450)
PLATELET # BLD AUTO: 207 10E3/UL (ref 150–450)
PLATELET # BLD AUTO: 211 10E3/UL (ref 150–450)
PLATELET # BLD AUTO: 217 10E3/UL (ref 150–450)
POTASSIUM SERPL-SCNC: 3.8 MMOL/L (ref 3.4–5.3)
POTASSIUM SERPL-SCNC: 3.9 MMOL/L (ref 3.4–5.3)
POTASSIUM SERPL-SCNC: 4.2 MMOL/L (ref 3.4–5.3)
POTASSIUM SERPL-SCNC: 4.3 MMOL/L (ref 3.4–5.3)
PR INTERVAL - MUSE: 158 MS
PROCALCITONIN SERPL IA-MCNC: 0.05 NG/ML
PROCALCITONIN SERPL IA-MCNC: 0.09 NG/ML
PROT SERPL-MCNC: 6.4 G/DL (ref 6.4–8.3)
PROT SERPL-MCNC: 6.8 G/DL (ref 6.4–8.3)
PROT SERPL-MCNC: 7.1 G/DL (ref 6.4–8.3)
QRS DURATION - MUSE: 100 MS
QT - MUSE: 436 MS
QTC - MUSE: 467 MS
R AXIS - MUSE: -21 DEGREES
RBC # BLD AUTO: 2.67 10E6/UL (ref 4.4–5.9)
RBC # BLD AUTO: 2.71 10E6/UL (ref 4.4–5.9)
RBC # BLD AUTO: 2.78 10E6/UL (ref 4.4–5.9)
RBC # BLD AUTO: 4.1 10E6/UL (ref 4.4–5.9)
RBC URINE: <1 /HPF
RETICS # AUTO: 0.06 10E6/UL (ref 0.03–0.1)
RETICS/RBC NFR AUTO: 1.6 % (ref 0.5–2)
RSV RNA SPEC NAA+PROBE: NEGATIVE
RSV RNA SPEC QL NAA+PROBE: NOT DETECTED
RSV RNA SPEC QL NAA+PROBE: NOT DETECTED
RV+EV RNA SPEC QL NAA+PROBE: NOT DETECTED
S PNEUM AG SPEC QL: NEGATIVE
SA TARGET DNA: POSITIVE
SARS-COV-2 RNA RESP QL NAA+PROBE: NEGATIVE
SODIUM SERPL-SCNC: 137 MMOL/L (ref 135–145)
SODIUM SERPL-SCNC: 138 MMOL/L (ref 135–145)
SP GR UR STRIP: 1.02 (ref 1–1.03)
SPECIMEN DESCRIPTION: NORMAL
SYSTOLIC BLOOD PRESSURE - MUSE: 135 MMHG
T AXIS - MUSE: 85 DEGREES
TROPONIN T SERPL HS-MCNC: 48 NG/L
TROPONIN T SERPL HS-MCNC: 49 NG/L
TSH SERPL DL<=0.005 MIU/L-ACNC: 1.77 UIU/ML (ref 0.3–4.2)
UROBILINOGEN UR STRIP-MCNC: <2 MG/DL
VENTRICULAR RATE- MUSE: 69 BPM
WBC # BLD AUTO: 6.8 10E3/UL (ref 4–11)
WBC # BLD AUTO: 8.1 10E3/UL (ref 4–11)
WBC # BLD AUTO: 8.5 10E3/UL (ref 4–11)
WBC # BLD AUTO: 8.5 10E3/UL (ref 4–11)
WBC # FLD AUTO: ABNORMAL /UL
WBC URINE: <1 /HPF

## 2024-01-01 PROCEDURE — 11103 TANGNTL BX SKIN EA SEP/ADDL: CPT | Mod: GC | Performed by: DERMATOLOGY

## 2024-01-01 PROCEDURE — 84145 PROCALCITONIN (PCT): CPT | Performed by: INTERNAL MEDICINE

## 2024-01-01 PROCEDURE — 93798 PHYS/QHP OP CAR RHAB W/ECG: CPT

## 2024-01-01 PROCEDURE — 87070 CULTURE OTHR SPECIMN AEROBIC: CPT | Mod: ORL | Performed by: ORTHOPAEDIC SURGERY

## 2024-01-01 PROCEDURE — 87641 MR-STAPH DNA AMP PROBE: CPT | Performed by: INTERNAL MEDICINE

## 2024-01-01 PROCEDURE — 36415 COLL VENOUS BLD VENIPUNCTURE: CPT | Performed by: PATHOLOGY

## 2024-01-01 PROCEDURE — 88341 IMHCHEM/IMCYTCHM EA ADD ANTB: CPT | Mod: 26 | Performed by: PATHOLOGY

## 2024-01-01 PROCEDURE — 83735 ASSAY OF MAGNESIUM: CPT | Performed by: INTERNAL MEDICINE

## 2024-01-01 PROCEDURE — 93306 TTE W/DOPPLER COMPLETE: CPT

## 2024-01-01 PROCEDURE — 87075 CULTR BACTERIA EXCEPT BLOOD: CPT | Mod: ORL | Performed by: ORTHOPAEDIC SURGERY

## 2024-01-01 PROCEDURE — 87637 SARSCOV2&INF A&B&RSV AMP PRB: CPT | Performed by: STUDENT IN AN ORGANIZED HEALTH CARE EDUCATION/TRAINING PROGRAM

## 2024-01-01 PROCEDURE — 88341 IMHCHEM/IMCYTCHM EA ADD ANTB: CPT | Mod: TC | Performed by: DERMATOLOGY

## 2024-01-01 PROCEDURE — 83516 IMMUNOASSAY NONANTIBODY: CPT | Mod: 59

## 2024-01-01 PROCEDURE — 97535 SELF CARE MNGMENT TRAINING: CPT | Mod: GO

## 2024-01-01 PROCEDURE — 36415 COLL VENOUS BLD VENIPUNCTURE: CPT

## 2024-01-01 PROCEDURE — 11104 PUNCH BX SKIN SINGLE LESION: CPT | Mod: GC | Performed by: DERMATOLOGY

## 2024-01-01 PROCEDURE — 250N000013 HC RX MED GY IP 250 OP 250 PS 637: Performed by: INTERNAL MEDICINE

## 2024-01-01 PROCEDURE — 99222 1ST HOSP IP/OBS MODERATE 55: CPT | Performed by: INTERNAL MEDICINE

## 2024-01-01 PROCEDURE — 87205 SMEAR GRAM STAIN: CPT | Mod: ORL | Performed by: ORTHOPAEDIC SURGERY

## 2024-01-01 PROCEDURE — 36415 COLL VENOUS BLD VENIPUNCTURE: CPT | Performed by: STUDENT IN AN ORGANIZED HEALTH CARE EDUCATION/TRAINING PROGRAM

## 2024-01-01 PROCEDURE — 99207 BLOOD MORPHOLOGY PATHOLOGIST REVIEW: CPT | Performed by: STUDENT IN AN ORGANIZED HEALTH CARE EDUCATION/TRAINING PROGRAM

## 2024-01-01 PROCEDURE — 89051 BODY FLUID CELL COUNT: CPT | Mod: ORL | Performed by: ORTHOPAEDIC SURGERY

## 2024-01-01 PROCEDURE — 250N000011 HC RX IP 250 OP 636: Performed by: INTERNAL MEDICINE

## 2024-01-01 PROCEDURE — 99291 CRITICAL CARE FIRST HOUR: CPT | Mod: 25

## 2024-01-01 PROCEDURE — 88185 FLOWCYTOMETRY/TC ADD-ON: CPT | Performed by: DERMATOLOGY

## 2024-01-01 PROCEDURE — 88305 TISSUE EXAM BY PATHOLOGIST: CPT | Mod: 26 | Performed by: PATHOLOGY

## 2024-01-01 PROCEDURE — 99214 OFFICE O/P EST MOD 30 MIN: CPT | Mod: 25 | Performed by: DERMATOLOGY

## 2024-01-01 PROCEDURE — 97110 THERAPEUTIC EXERCISES: CPT | Mod: GO

## 2024-01-01 PROCEDURE — 88342 IMHCHEM/IMCYTCHM 1ST ANTB: CPT | Mod: 26 | Performed by: PATHOLOGY

## 2024-01-01 PROCEDURE — 81342 TRG GENE REARRANGEMENT ANAL: CPT | Performed by: DERMATOLOGY

## 2024-01-01 PROCEDURE — 84443 ASSAY THYROID STIM HORMONE: CPT | Performed by: STUDENT IN AN ORGANIZED HEALTH CARE EDUCATION/TRAINING PROGRAM

## 2024-01-01 PROCEDURE — 84484 ASSAY OF TROPONIN QUANT: CPT | Performed by: STUDENT IN AN ORGANIZED HEALTH CARE EDUCATION/TRAINING PROGRAM

## 2024-01-01 PROCEDURE — 85045 AUTOMATED RETICULOCYTE COUNT: CPT | Performed by: PATHOLOGY

## 2024-01-01 PROCEDURE — 89060 EXAM SYNOVIAL FLUID CRYSTALS: CPT | Mod: ORL | Performed by: ORTHOPAEDIC SURGERY

## 2024-01-01 PROCEDURE — 80053 COMPREHEN METABOLIC PANEL: CPT | Performed by: INTERNAL MEDICINE

## 2024-01-01 PROCEDURE — 210N000002 HC R&B HEART CARE

## 2024-01-01 PROCEDURE — 93797 PHYS/QHP OP CAR RHAB WO ECG: CPT | Mod: 59

## 2024-01-01 PROCEDURE — 87040 BLOOD CULTURE FOR BACTERIA: CPT | Performed by: STUDENT IN AN ORGANIZED HEALTH CARE EDUCATION/TRAINING PROGRAM

## 2024-01-01 PROCEDURE — 85025 COMPLETE CBC W/AUTO DIFF WBC: CPT | Performed by: INTERNAL MEDICINE

## 2024-01-01 PROCEDURE — 81001 URINALYSIS AUTO W/SCOPE: CPT | Performed by: INTERNAL MEDICINE

## 2024-01-01 PROCEDURE — 87581 M.PNEUMON DNA AMP PROBE: CPT | Performed by: INTERNAL MEDICINE

## 2024-01-01 PROCEDURE — 99239 HOSP IP/OBS DSCHRG MGMT >30: CPT | Performed by: INTERNAL MEDICINE

## 2024-01-01 PROCEDURE — 80053 COMPREHEN METABOLIC PANEL: CPT | Performed by: STUDENT IN AN ORGANIZED HEALTH CARE EDUCATION/TRAINING PROGRAM

## 2024-01-01 PROCEDURE — 258N000003 HC RX IP 258 OP 636: Performed by: STUDENT IN AN ORGANIZED HEALTH CARE EDUCATION/TRAINING PROGRAM

## 2024-01-01 PROCEDURE — 36415 COLL VENOUS BLD VENIPUNCTURE: CPT | Performed by: INTERNAL MEDICINE

## 2024-01-01 PROCEDURE — 85014 HEMATOCRIT: CPT | Performed by: INTERNAL MEDICINE

## 2024-01-01 PROCEDURE — 93005 ELECTROCARDIOGRAM TRACING: CPT | Performed by: STUDENT IN AN ORGANIZED HEALTH CARE EDUCATION/TRAINING PROGRAM

## 2024-01-01 PROCEDURE — G0452 MOLECULAR PATHOLOGY INTERPR: HCPCS | Mod: 26 | Performed by: STUDENT IN AN ORGANIZED HEALTH CARE EDUCATION/TRAINING PROGRAM

## 2024-01-01 PROCEDURE — 99442 PR PHYSICIAN TELEPHONE EVALUATION 11-20 MIN: CPT | Performed by: DERMATOLOGY

## 2024-01-01 PROCEDURE — 87899 AGENT NOS ASSAY W/OPTIC: CPT | Performed by: INTERNAL MEDICINE

## 2024-01-01 PROCEDURE — 88312 SPECIAL STAINS GROUP 1: CPT | Mod: 26 | Performed by: PATHOLOGY

## 2024-01-01 PROCEDURE — 71045 X-RAY EXAM CHEST 1 VIEW: CPT

## 2024-01-01 PROCEDURE — 97165 OT EVAL LOW COMPLEX 30 MIN: CPT | Mod: GO

## 2024-01-01 PROCEDURE — 93306 TTE W/DOPPLER COMPLETE: CPT | Mod: 26 | Performed by: INTERNAL MEDICINE

## 2024-01-01 PROCEDURE — G0452 MOLECULAR PATHOLOGY INTERPR: HCPCS | Mod: 26 | Performed by: PATHOLOGY

## 2024-01-01 PROCEDURE — 88346 IMFLUOR 1ST 1ANTB STAIN PX: CPT

## 2024-01-01 PROCEDURE — 83516 IMMUNOASSAY NONANTIBODY: CPT

## 2024-01-01 PROCEDURE — 97110 THERAPEUTIC EXERCISES: CPT | Mod: GO | Performed by: OCCUPATIONAL THERAPIST

## 2024-01-01 PROCEDURE — 83615 LACTATE (LD) (LDH) ENZYME: CPT | Performed by: PATHOLOGY

## 2024-01-01 PROCEDURE — 80053 COMPREHEN METABOLIC PANEL: CPT | Performed by: PATHOLOGY

## 2024-01-01 PROCEDURE — 99223 1ST HOSP IP/OBS HIGH 75: CPT | Mod: AI | Performed by: INTERNAL MEDICINE

## 2024-01-01 PROCEDURE — 258N000003 HC RX IP 258 OP 636: Performed by: INTERNAL MEDICINE

## 2024-01-01 PROCEDURE — 85025 COMPLETE CBC W/AUTO DIFF WBC: CPT | Performed by: PATHOLOGY

## 2024-01-01 PROCEDURE — 80048 BASIC METABOLIC PNL TOTAL CA: CPT | Performed by: INTERNAL MEDICINE

## 2024-01-01 PROCEDURE — 83880 ASSAY OF NATRIURETIC PEPTIDE: CPT | Performed by: STUDENT IN AN ORGANIZED HEALTH CARE EDUCATION/TRAINING PROGRAM

## 2024-01-01 PROCEDURE — 88187 FLOWCYTOMETRY/READ 2-8: CPT | Mod: GC | Performed by: STUDENT IN AN ORGANIZED HEALTH CARE EDUCATION/TRAINING PROGRAM

## 2024-01-01 PROCEDURE — 71275 CT ANGIOGRAPHY CHEST: CPT | Mod: MF

## 2024-01-01 PROCEDURE — 88184 FLOWCYTOMETRY/ TC 1 MARKER: CPT | Performed by: STUDENT IN AN ORGANIZED HEALTH CARE EDUCATION/TRAINING PROGRAM

## 2024-01-01 PROCEDURE — 17110 DESTRUCTION B9 LES UP TO 14: CPT | Mod: GC | Performed by: DERMATOLOGY

## 2024-01-01 PROCEDURE — 99232 SBSQ HOSP IP/OBS MODERATE 35: CPT | Performed by: INTERNAL MEDICINE

## 2024-01-01 PROCEDURE — 250N000011 HC RX IP 250 OP 636: Performed by: STUDENT IN AN ORGANIZED HEALTH CARE EDUCATION/TRAINING PROGRAM

## 2024-01-01 PROCEDURE — 99207 PR APP CREDIT; MD BILLING SHARED VISIT: CPT | Performed by: INTERNAL MEDICINE

## 2024-01-01 PROCEDURE — 83735 ASSAY OF MAGNESIUM: CPT | Performed by: STUDENT IN AN ORGANIZED HEALTH CARE EDUCATION/TRAINING PROGRAM

## 2024-01-01 PROCEDURE — 85025 COMPLETE CBC W/AUTO DIFF WBC: CPT | Performed by: STUDENT IN AN ORGANIZED HEALTH CARE EDUCATION/TRAINING PROGRAM

## 2024-01-01 RX ORDER — CLOPIDOGREL BISULFATE 75 MG/1
75 TABLET ORAL DAILY
Status: DISCONTINUED | OUTPATIENT
Start: 2024-01-01 | End: 2024-01-01 | Stop reason: HOSPADM

## 2024-01-01 RX ORDER — AMOXICILLIN 250 MG
2 CAPSULE ORAL 2 TIMES DAILY PRN
Status: DISCONTINUED | OUTPATIENT
Start: 2024-01-01 | End: 2024-01-01 | Stop reason: HOSPADM

## 2024-01-01 RX ORDER — LIDOCAINE 40 MG/G
CREAM TOPICAL
Status: DISCONTINUED | OUTPATIENT
Start: 2024-01-01 | End: 2024-01-01 | Stop reason: HOSPADM

## 2024-01-01 RX ORDER — HYDROXYZINE HYDROCHLORIDE 25 MG/1
25 TABLET, FILM COATED ORAL AT BEDTIME
Status: DISCONTINUED | OUTPATIENT
Start: 2024-01-01 | End: 2024-01-01 | Stop reason: HOSPADM

## 2024-01-01 RX ORDER — FUROSEMIDE 10 MG/ML
20 INJECTION INTRAMUSCULAR; INTRAVENOUS EVERY 12 HOURS
Status: DISCONTINUED | OUTPATIENT
Start: 2024-01-01 | End: 2024-01-01

## 2024-01-01 RX ORDER — AMLODIPINE BESYLATE 5 MG/1
5 TABLET ORAL DAILY
Status: ON HOLD | COMMUNITY
End: 2024-01-01

## 2024-01-01 RX ORDER — HYDROXYZINE HYDROCHLORIDE 25 MG/1
25 TABLET, FILM COATED ORAL AT BEDTIME
COMMUNITY

## 2024-01-01 RX ORDER — ACETAMINOPHEN 650 MG/1
650 SUPPOSITORY RECTAL EVERY 4 HOURS PRN
Status: DISCONTINUED | OUTPATIENT
Start: 2024-01-01 | End: 2024-01-01 | Stop reason: HOSPADM

## 2024-01-01 RX ORDER — FUROSEMIDE 20 MG
20 TABLET ORAL PRN
COMMUNITY

## 2024-01-01 RX ORDER — ATORVASTATIN CALCIUM 40 MG/1
80 TABLET, FILM COATED ORAL EVERY EVENING
Status: DISCONTINUED | OUTPATIENT
Start: 2024-01-01 | End: 2024-01-01 | Stop reason: HOSPADM

## 2024-01-01 RX ORDER — IOPAMIDOL 755 MG/ML
100 INJECTION, SOLUTION INTRAVASCULAR ONCE
Status: COMPLETED | OUTPATIENT
Start: 2024-01-01 | End: 2024-01-01

## 2024-01-01 RX ORDER — MAGNESIUM OXIDE 400 MG/1
400 TABLET ORAL EVERY 4 HOURS
Status: COMPLETED | OUTPATIENT
Start: 2024-01-01 | End: 2024-01-01

## 2024-01-01 RX ORDER — ONDANSETRON 2 MG/ML
4 INJECTION INTRAMUSCULAR; INTRAVENOUS EVERY 6 HOURS PRN
Status: DISCONTINUED | OUTPATIENT
Start: 2024-01-01 | End: 2024-01-01 | Stop reason: HOSPADM

## 2024-01-01 RX ORDER — AMOXICILLIN 250 MG
1 CAPSULE ORAL 2 TIMES DAILY PRN
Status: DISCONTINUED | OUTPATIENT
Start: 2024-01-01 | End: 2024-01-01 | Stop reason: HOSPADM

## 2024-01-01 RX ORDER — ENOXAPARIN SODIUM 100 MG/ML
40 INJECTION SUBCUTANEOUS EVERY 24 HOURS
Status: DISCONTINUED | OUTPATIENT
Start: 2024-01-01 | End: 2024-01-01 | Stop reason: HOSPADM

## 2024-01-01 RX ORDER — AMLODIPINE BESYLATE 5 MG/1
5 TABLET ORAL DAILY
Status: SHIPPED
Start: 2024-01-01

## 2024-01-01 RX ORDER — PREDNISONE 10 MG/1
TABLET ORAL
Qty: 200 TABLET | Refills: 0 | Status: SHIPPED | OUTPATIENT
Start: 2024-01-01

## 2024-01-01 RX ORDER — FERROUS SULFATE 325(65) MG
325 TABLET ORAL
Status: DISCONTINUED | OUTPATIENT
Start: 2024-01-01 | End: 2024-01-01 | Stop reason: HOSPADM

## 2024-01-01 RX ORDER — FOLIC ACID 1 MG/1
1 TABLET ORAL DAILY
Qty: 90 TABLET | Refills: 3 | Status: SHIPPED | OUTPATIENT
Start: 2024-01-01

## 2024-01-01 RX ORDER — PREDNISONE 20 MG/1
TABLET ORAL
Qty: 90 TABLET | Refills: 1 | Status: SHIPPED | OUTPATIENT
Start: 2024-01-01

## 2024-01-01 RX ORDER — BETAMETHASONE DIPROPIONATE 0.5 MG/G
OINTMENT, AUGMENTED TOPICAL 2 TIMES DAILY
Qty: 100 G | Refills: 11 | Status: SHIPPED | OUTPATIENT
Start: 2024-01-01 | End: 2024-01-01

## 2024-01-01 RX ORDER — ASPIRIN 81 MG/1
81 TABLET ORAL DAILY
Status: DISCONTINUED | OUTPATIENT
Start: 2024-01-01 | End: 2024-01-01 | Stop reason: HOSPADM

## 2024-01-01 RX ORDER — CARVEDILOL 12.5 MG/1
12.5 TABLET ORAL 2 TIMES DAILY
COMMUNITY

## 2024-01-01 RX ORDER — PIPERACILLIN SODIUM, TAZOBACTAM SODIUM 3; .375 G/15ML; G/15ML
3.38 INJECTION, POWDER, LYOPHILIZED, FOR SOLUTION INTRAVENOUS EVERY 8 HOURS
Status: DISCONTINUED | OUTPATIENT
Start: 2024-01-01 | End: 2024-01-01 | Stop reason: HOSPADM

## 2024-01-01 RX ORDER — CLOPIDOGREL BISULFATE 75 MG/1
75 TABLET ORAL DAILY
COMMUNITY

## 2024-01-01 RX ORDER — FUROSEMIDE 20 MG
20 TABLET ORAL DAILY
COMMUNITY

## 2024-01-01 RX ORDER — AZITHROMYCIN 250 MG/1
TABLET, FILM COATED ORAL
Qty: 6 TABLET | Refills: 0 | Status: SHIPPED | OUTPATIENT
Start: 2024-01-01 | End: 2024-01-01

## 2024-01-01 RX ORDER — METHOTREXATE 2.5 MG/1
15 TABLET ORAL
Qty: 30 TABLET | Refills: 2 | Status: SHIPPED | OUTPATIENT
Start: 2024-01-01

## 2024-01-01 RX ORDER — ACETAMINOPHEN 325 MG/1
650 TABLET ORAL EVERY 4 HOURS PRN
Status: DISCONTINUED | OUTPATIENT
Start: 2024-01-01 | End: 2024-01-01 | Stop reason: HOSPADM

## 2024-01-01 RX ORDER — DESONIDE 0.5 MG/G
OINTMENT TOPICAL
Qty: 60 G | Refills: 3 | Status: SHIPPED | OUTPATIENT
Start: 2024-01-01

## 2024-01-01 RX ORDER — POTASSIUM CHLORIDE 1500 MG/1
20 TABLET, EXTENDED RELEASE ORAL ONCE
Status: COMPLETED | OUTPATIENT
Start: 2024-01-01 | End: 2024-01-01

## 2024-01-01 RX ORDER — ASPIRIN 81 MG/1
81 TABLET ORAL DAILY
COMMUNITY

## 2024-01-01 RX ORDER — CLINDAMYCIN PHOSPHATE 10 UG/ML
LOTION TOPICAL 2 TIMES DAILY
Qty: 60 ML | Refills: 3 | Status: SHIPPED | OUTPATIENT
Start: 2024-01-01

## 2024-01-01 RX ORDER — FEXOFENADINE HCL 180 MG/1
180 TABLET ORAL 2 TIMES DAILY
Status: DISCONTINUED | OUTPATIENT
Start: 2024-01-01 | End: 2024-01-01 | Stop reason: HOSPADM

## 2024-01-01 RX ORDER — CALCIUM CARBONATE 500 MG/1
1000 TABLET, CHEWABLE ORAL 4 TIMES DAILY PRN
Status: DISCONTINUED | OUTPATIENT
Start: 2024-01-01 | End: 2024-01-01 | Stop reason: HOSPADM

## 2024-01-01 RX ORDER — GABAPENTIN 100 MG/1
CAPSULE ORAL
Qty: 30 CAPSULE | Refills: 1 | Status: SHIPPED | OUTPATIENT
Start: 2024-01-01

## 2024-01-01 RX ORDER — FOLIC ACID 1 MG/1
1 TABLET ORAL DAILY
Status: DISCONTINUED | OUTPATIENT
Start: 2024-01-01 | End: 2024-01-01 | Stop reason: HOSPADM

## 2024-01-01 RX ORDER — TACROLIMUS 1 MG/G
OINTMENT TOPICAL
Qty: 100 G | Refills: 11 | Status: SHIPPED | OUTPATIENT
Start: 2024-01-01

## 2024-01-01 RX ORDER — TAMSULOSIN HYDROCHLORIDE 0.4 MG/1
0.4 CAPSULE ORAL
Status: DISCONTINUED | OUTPATIENT
Start: 2024-01-01 | End: 2024-01-01 | Stop reason: HOSPADM

## 2024-01-01 RX ORDER — FUROSEMIDE 10 MG/ML
40 INJECTION INTRAMUSCULAR; INTRAVENOUS EVERY 12 HOURS
Status: DISCONTINUED | OUTPATIENT
Start: 2024-01-01 | End: 2024-01-01

## 2024-01-01 RX ORDER — AMLODIPINE BESYLATE 5 MG/1
5 TABLET ORAL DAILY
Status: DISCONTINUED | OUTPATIENT
Start: 2024-01-01 | End: 2024-01-01 | Stop reason: HOSPADM

## 2024-01-01 RX ORDER — FUROSEMIDE 10 MG/ML
40 INJECTION INTRAMUSCULAR; INTRAVENOUS ONCE
Status: COMPLETED | OUTPATIENT
Start: 2024-01-01 | End: 2024-01-01

## 2024-01-01 RX ORDER — MAGNESIUM SULFATE HEPTAHYDRATE 40 MG/ML
2 INJECTION, SOLUTION INTRAVENOUS ONCE
Status: COMPLETED | OUTPATIENT
Start: 2024-01-01 | End: 2024-01-01

## 2024-01-01 RX ORDER — ONDANSETRON 4 MG/1
4 TABLET, ORALLY DISINTEGRATING ORAL EVERY 6 HOURS PRN
Status: DISCONTINUED | OUTPATIENT
Start: 2024-01-01 | End: 2024-01-01 | Stop reason: HOSPADM

## 2024-01-01 RX ORDER — PIPERACILLIN SODIUM, TAZOBACTAM SODIUM 3; .375 G/15ML; G/15ML
3.38 INJECTION, POWDER, LYOPHILIZED, FOR SOLUTION INTRAVENOUS ONCE
Status: COMPLETED | OUTPATIENT
Start: 2024-01-01 | End: 2024-01-01

## 2024-01-01 RX ORDER — CARVEDILOL 6.25 MG/1
12.5 TABLET ORAL 2 TIMES DAILY
Status: DISCONTINUED | OUTPATIENT
Start: 2024-01-01 | End: 2024-01-01 | Stop reason: HOSPADM

## 2024-01-01 RX ADMIN — TAMSULOSIN HYDROCHLORIDE 0.4 MG: 0.4 CAPSULE ORAL at 16:48

## 2024-01-01 RX ADMIN — FUROSEMIDE 40 MG: 10 INJECTION, SOLUTION INTRAMUSCULAR; INTRAVENOUS at 10:53

## 2024-01-01 RX ADMIN — HYDROXYZINE HYDROCHLORIDE 25 MG: 25 TABLET, FILM COATED ORAL at 22:01

## 2024-01-01 RX ADMIN — AMLODIPINE BESYLATE 5 MG: 5 TABLET ORAL at 16:48

## 2024-01-01 RX ADMIN — IOPAMIDOL 100 ML: 755 INJECTION, SOLUTION INTRAVENOUS at 00:28

## 2024-01-01 RX ADMIN — ASPIRIN 81 MG: 81 TABLET, COATED ORAL at 08:35

## 2024-01-01 RX ADMIN — FOLIC ACID 1 MG: 1 TABLET ORAL at 16:48

## 2024-01-01 RX ADMIN — FUROSEMIDE 40 MG: 10 INJECTION, SOLUTION INTRAMUSCULAR; INTRAVENOUS at 01:54

## 2024-01-01 RX ADMIN — FEXOFENADINE HYDROCHLORIDE 180 MG: 180 TABLET ORAL at 20:12

## 2024-01-01 RX ADMIN — Medication 400 MG: at 16:48

## 2024-01-01 RX ADMIN — FERROUS SULFATE TAB 325 MG (65 MG ELEMENTAL FE) 325 MG: 325 (65 FE) TAB at 08:35

## 2024-01-01 RX ADMIN — FOLIC ACID 1 MG: 1 TABLET ORAL at 08:35

## 2024-01-01 RX ADMIN — FUROSEMIDE 40 MG: 10 INJECTION, SOLUTION INTRAMUSCULAR; INTRAVENOUS at 22:01

## 2024-01-01 RX ADMIN — Medication 400 MG: at 03:46

## 2024-01-01 RX ADMIN — ASPIRIN 81 MG: 81 TABLET, COATED ORAL at 16:48

## 2024-01-01 RX ADMIN — Medication 400 MG: at 20:12

## 2024-01-01 RX ADMIN — CLOPIDOGREL BISULFATE 75 MG: 75 TABLET ORAL at 16:48

## 2024-01-01 RX ADMIN — PIPERACILLIN AND TAZOBACTAM 3.38 G: 3; .375 INJECTION, POWDER, FOR SOLUTION INTRAVENOUS at 00:22

## 2024-01-01 RX ADMIN — VANCOMYCIN HYDROCHLORIDE 1500 MG: 5 INJECTION, POWDER, LYOPHILIZED, FOR SOLUTION INTRAVENOUS at 22:39

## 2024-01-01 RX ADMIN — CLOPIDOGREL BISULFATE 75 MG: 75 TABLET ORAL at 08:35

## 2024-01-01 RX ADMIN — PIPERACILLIN AND TAZOBACTAM 3.38 G: 3; .375 INJECTION, POWDER, FOR SOLUTION INTRAVENOUS at 08:39

## 2024-01-01 RX ADMIN — PIPERACILLIN AND TAZOBACTAM 3.38 G: 3; .375 INJECTION, POWDER, FOR SOLUTION INTRAVENOUS at 16:48

## 2024-01-01 RX ADMIN — FEXOFENADINE HYDROCHLORIDE 180 MG: 180 TABLET ORAL at 08:35

## 2024-01-01 RX ADMIN — MAGNESIUM SULFATE HEPTAHYDRATE 2 G: 40 INJECTION, SOLUTION INTRAVENOUS at 06:34

## 2024-01-01 RX ADMIN — ATORVASTATIN CALCIUM 80 MG: 40 TABLET, FILM COATED ORAL at 20:12

## 2024-01-01 RX ADMIN — PIPERACILLIN AND TAZOBACTAM 3.38 G: 3; .375 INJECTION, POWDER, FOR SOLUTION INTRAVENOUS at 08:05

## 2024-01-01 RX ADMIN — PIPERACILLIN AND TAZOBACTAM 3.38 G: 3; .375 INJECTION, POWDER, FOR SOLUTION INTRAVENOUS at 01:52

## 2024-01-01 RX ADMIN — ENOXAPARIN SODIUM 40 MG: 100 INJECTION SUBCUTANEOUS at 10:53

## 2024-01-01 RX ADMIN — Medication 400 MG: at 08:05

## 2024-01-01 RX ADMIN — POTASSIUM CHLORIDE 20 MEQ: 1500 TABLET, EXTENDED RELEASE ORAL at 06:34

## 2024-01-01 RX ADMIN — VANCOMYCIN HYDROCHLORIDE 1750 MG: 5 INJECTION, POWDER, LYOPHILIZED, FOR SOLUTION INTRAVENOUS at 03:05

## 2024-01-01 ASSESSMENT — ACTIVITIES OF DAILY LIVING (ADL)
ADLS_ACUITY_SCORE: 35
ADLS_ACUITY_SCORE: 35
ADLS_ACUITY_SCORE: 38
ADLS_ACUITY_SCORE: 23
ADLS_ACUITY_SCORE: 35
ADLS_ACUITY_SCORE: 38
ADLS_ACUITY_SCORE: 36
ADLS_ACUITY_SCORE: 23
ADLS_ACUITY_SCORE: 38
ADLS_ACUITY_SCORE: 38
DEPENDENT_IADLS:: INDEPENDENT
ADLS_ACUITY_SCORE: 23
ADLS_ACUITY_SCORE: 35
ADLS_ACUITY_SCORE: 23
ADLS_ACUITY_SCORE: 38
ADLS_ACUITY_SCORE: 38
ADLS_ACUITY_SCORE: 23
ADLS_ACUITY_SCORE: 38
ADLS_ACUITY_SCORE: 23
ADLS_ACUITY_SCORE: 35
ADLS_ACUITY_SCORE: 23
ADLS_ACUITY_SCORE: 35
ADLS_ACUITY_SCORE: 23
ADLS_ACUITY_SCORE: 35
ADLS_ACUITY_SCORE: 23
ADLS_ACUITY_SCORE: 38
ADLS_ACUITY_SCORE: 23
ADLS_ACUITY_SCORE: 36
ADLS_ACUITY_SCORE: 23
ADLS_ACUITY_SCORE: 36
ADLS_ACUITY_SCORE: 23

## 2024-01-01 ASSESSMENT — COLUMBIA-SUICIDE SEVERITY RATING SCALE - C-SSRS
2. HAVE YOU ACTUALLY HAD ANY THOUGHTS OF KILLING YOURSELF IN THE PAST MONTH?: NO
6. HAVE YOU EVER DONE ANYTHING, STARTED TO DO ANYTHING, OR PREPARED TO DO ANYTHING TO END YOUR LIFE?: NO
1. IN THE PAST MONTH, HAVE YOU WISHED YOU WERE DEAD OR WISHED YOU COULD GO TO SLEEP AND NOT WAKE UP?: NO

## 2024-01-01 ASSESSMENT — PAIN SCALES - GENERAL
PAINLEVEL: NO PAIN (0)
PAINLEVEL: MILD PAIN (3)
PAINLEVEL: NO PAIN (0)

## 2024-01-09 NOTE — TELEPHONE ENCOUNTER
dupilumab (DUPIXENT) 300 MG/2ML prefilled pen    Inject 4 mLs (600 mg) Subcutaneous See Admin Instructions for 1 dose - Subcutaneous   Last Written Prescription Date:  10/6/23  Last Fill Quantity: 4 ml ,   # refills: 0  Sent to    [facility]     Last Office Visit : 10/6/23  Future Office visit:  2/16/24       [facility] is requesting starting dose 600 mg, last sent 10/6/23. Maintenance dose has refills in place.   Pharmacy Filling the Rx: [facility] [address]   Note recent chart notes reviewing patient assistance program [employer] documentation noted in chart.  FREE DRUG APPLICATION APPROVED     Medication: DUPIXENT 300 MG/2ML SC SOPN  Program Name:  [employer]  Effective Date: 11/28/2023  Expiration Date: 12/31/2023

## 2024-01-11 NOTE — TELEPHONE ENCOUNTER
FREE DRUG APPLICATION APPROVED    Medication: DUPIXENT 300 MG/2ML SC SOPN  Program Name:  [employer] [facility]  Effective Date: 1/2/2024  Expiration Date: 12/31/2024  [facility]  Patient Notified: yes  Additional Information:

## 2024-02-16 NOTE — NURSING NOTE
Dermatology Rooming Note    Junaid Cormier's goals for this visit include:   Chief Complaint   Patient presents with    Derm Problem     Sean has been on multiple steroids and antibiotics due to a joint infection. His skin has flared and has multiple lesions throughout his body.     Devon ACHARYA CMA

## 2024-02-16 NOTE — LETTER
2/16/2024       RE: Junaid Cormier  [address] [place] [address]     Dear Colleague,    Thank you for referring your patient, Junaid Cormier, to the [facility] DERMATOLOGY CLINIC [place] at [facility]. Please see a copy of my visit note below.    [facility] Dermatology Note  Encounter Date: Feb 16, 2024  Store-and-Forward and Telephone ([phone]). Location of teledermatologist: [facility] DERMATOLOGY CLINIC [place].  Start time: 7:15. End time: 7:28.    Dermatology Problem List:  1. Eczematous dermatitis: may have component of ACD; MF has been a diagnostic consideration in past but biopsies/clinical not supportive  - outside biopsy x2: subacute spongiotic dermatitis  - current therapy: tacrolimus ointment BID, urea 20% cream BID, augmented betamethasone ointment/lotion, tralokinumab  - restarting dupilumab  - previous therapy: mycophenolate 1000 mg BID, cetirizine 10/20, dupilumab, mg, desoximetasone/desonide/fluocinonide, nbUVB phototherapy  - in reserve: methotrexate, CLARKE inhibitor  2. Actinic purpura: due in part to topical steroid use     ____________________________________________    Assessment & Plan:     Atopic dermatitis: recent flaring in context of sepsis/joint infection and systemic antibiotics with dupilumab on hold. Suspect rash is multifactorial from drug rash (causative agent thought to be doxycycline, now discontinued) + flaring atopic dermatitis. Eruption now with blisters and query bullous pemphigoid as possible diagnosis. Will check with infectious disease at [facility] who is currently managing his antibiotics about restarting. In the interim, will continue topicals and check pemphigoid panel.  - check on dupilumab with ID  - check pemphigoid panel  - topicals - augmented betamethasone, urea, tacrolimus            Procedures Performed:    None    Follow-up: 4 months    Staff and Scribe:    Scribe Disclosure:   By signing my name below, I, Brissa Huang, attest that this documentation has been prepared under the direction and in the presence of Gilberto Virgen MD.  - Electronically Signed: Brissa Huang 02/16/24     Provider Disclosure:   The documentation recorded by the scribe accurately reflects the services I personally performed and the decisions made by me.    Gilberto Virgen MD   of Dermatology  Department of Dermatology  [facility]        ____________________________________________    CC: Derm Problem    HPI:  Mr. Junaid Cormier is a(n) 73 year old male who presents today as a return patient for eczematous dermatitis    Eczematous dermatitis    Mid December - flu-like symptoms - joint infection in wrist from sepsis  - in hospital from 12/17-27 - cleanout of wrist  - given ceftriaxone - then developed knee symptoms - diagnosed as gout  - received steroid injection - felt better, then flared again  - then given medrol - then questionable joint infection  - had cleanout of knee  - then doxycycline x9 days - had reaction  - taken off doxycycline by ID  - skin peeling all over torso  - currently on IV ceftriaxone  - off dupilumab since December    Patient is otherwise feeling well, without additional skin concerns.    Labs Reviewed:  N/A    Physical Exam:  Vitals: There were no vitals taken for this visit.  SKIN: Teledermatology photos were reviewed; image quality and interpretability: acceptable. Image date: 2/9/24.  - widespread erythematous to urticarial patches and plaques with numerous circumscribed erosions      Medications:  Current Outpatient Medications   Medication    aspirin (ASA) 81 MG EC tablet    atorvastatin (LIPITOR) 80 MG tablet    augmented betamethasone dipropionate (DIPROLENE) 0.05 % external lotion    augmented betamethasone dipropionate (DIPROLENE-AF) 0.05 % external ointment    cetirizine HCl 10 MG CAPS    desonide  (DESOWEN) 0.05 % external ointment    dupilumab (DUPIXENT) 300 MG/2ML prefilled pen    dupilumab (DUPIXENT) 300 MG/2ML prefilled pen    ferrous sulfate (FEROSUL) 325 (65 Fe) MG tablet    fexofenadine (ALLEGRA) 180 MG tablet    folic acid (FOLVITE) 1 MG tablet    hydrOXYzine (ATARAX) 25 MG tablet    lisinopril (ZESTRIL) 20 MG tablet    tacrolimus (PROTOPIC) 0.1 % external ointment    tamsulosin (FLOMAX) 0.4 MG capsule    Tralokinumab-ldrm 150 MG/ML SOSY    urea (GORMEL) 20 % external cream     Current Facility-Administered Medications   Medication    lidocaine 1% with EPINEPHrine 1:100,000 injection 3 mL      Past Medical/Surgical History:   Patient Active Problem List   Diagnosis    Intrinsic atopic dermatitis     No past medical history on file.    CC No referring provider defined for this encounter. on close of this encounter.     Called office of Dr. Summers ([facility]) re: dupilumab. Left message with staff

## 2024-02-16 NOTE — PROGRESS NOTES
[facility] Dermatology Note  Encounter Date: Feb 16, 2024  Store-and-Forward and Telephone ([phone]). Location of teledermatologist: [facility] [place].  Start time: 7:15. End time: 7:28.    Dermatology Problem List:  1. Eczematous dermatitis: may have component of ACD; MF has been a diagnostic consideration in past but biopsies/clinical not supportive  - outside biopsy x2: subacute spongiotic dermatitis  - current therapy: tacrolimus ointment BID, urea 20% cream BID, augmented betamethasone ointment/lotion, tralokinumab  - restarting dupilumab  - previous therapy: mycophenolate 1000 mg BID, cetirizine 10/20, dupilumab, mg, desoximetasone/desonide/fluocinonide, nbUVB phototherapy  - in reserve: methotrexate, CLARKE inhibitor  2. Actinic purpura: due in part to topical steroid use     ____________________________________________    Assessment & Plan:     Atopic dermatitis: recent flaring in context of sepsis/joint infection and systemic antibiotics with dupilumab on hold. Suspect rash is multifactorial from drug rash (causative agent thought to be doxycycline, now discontinued) + flaring atopic dermatitis. Eruption now with blisters and query bullous pemphigoid as possible diagnosis. Will check with infectious disease at [facility] who is currently managing his antibiotics about restarting. In the interim, will continue topicals and check pemphigoid panel.  - check on dupilumab with ID  - check pemphigoid panel  - topicals - augmented betamethasone, urea, tacrolimus            Procedures Performed:    None    Follow-up: 4 months    Staff and Scribe:   Scribe Disclosure:   By signing my name below, I, Brissa Huang, attest that this documentation has been prepared under the direction and in the presence of Gilberto Virgen MD.  - Electronically Signed: Brissa Huang 02/16/24     Provider Disclosure:   The documentation recorded by the scribe accurately reflects the  services I personally performed and the decisions made by me.    Gilberto Virgen MD   of Dermatology  Department of Dermatology  [facility] [employer]        ____________________________________________    CC: Derm Problem    HPI:  Mr. Junaid Cormier is a(n) 73 year old male who presents today as a return patient for eczematous dermatitis    Eczematous dermatitis    Mid December - flu-like symptoms - joint infection in wrist from sepsis  - in hospital from 12/17-27 - cleanout of wrist  - given ceftriaxone - then developed knee symptoms - diagnosed as gout  - received steroid injection - felt better, then flared again  - then given medrol - then questionable joint infection  - had cleanout of knee  - then doxycycline x9 days - had reaction  - taken off doxycycline by ID  - skin peeling all over torso  - currently on IV ceftriaxone  - off dupilumab since December    Patient is otherwise feeling well, without additional skin concerns.    Labs Reviewed:  N/A    Physical Exam:  Vitals: There were no vitals taken for this visit.  SKIN: Teledermatology photos were reviewed; image quality and interpretability: acceptable. Image date: 2/9/24.  - widespread erythematous to urticarial patches and plaques with numerous circumscribed erosions      Medications:  Current Outpatient Medications   Medication    aspirin (ASA) 81 MG EC tablet    atorvastatin (LIPITOR) 80 MG tablet    augmented betamethasone dipropionate (DIPROLENE) 0.05 % external lotion    augmented betamethasone dipropionate (DIPROLENE-AF) 0.05 % external ointment    cetirizine HCl 10 MG CAPS    desonide (DESOWEN) 0.05 % external ointment    dupilumab (DUPIXENT) 300 MG/2ML prefilled pen    dupilumab (DUPIXENT) 300 MG/2ML prefilled pen    ferrous sulfate (FEROSUL) 325 (65 Fe) MG tablet    fexofenadine (ALLEGRA) 180 MG tablet    folic acid (FOLVITE) 1 MG tablet    hydrOXYzine (ATARAX) 25 MG tablet    lisinopril (ZESTRIL)  20 MG tablet    tacrolimus (PROTOPIC) 0.1 % external ointment    tamsulosin (FLOMAX) 0.4 MG capsule    Tralokinumab-ldrm 150 MG/ML SOSY    urea (GORMEL) 20 % external cream     Current Facility-Administered Medications   Medication    lidocaine 1% with EPINEPHrine 1:100,000 injection 3 mL      Past Medical/Surgical History:   Patient Active Problem List   Diagnosis    Intrinsic atopic dermatitis     No past medical history on file.    CC No referring provider defined for this encounter. on close of this encounter.

## 2024-03-25 PROBLEM — R06.02 SHORTNESS OF BREATH: Status: ACTIVE | Noted: 2024-01-01

## 2024-03-25 PROBLEM — R00.2 PALPITATIONS: Status: ACTIVE | Noted: 2024-01-01

## 2024-03-25 NOTE — CONSULTS
Care Management Initial Consult      General Information  Assessment completed with: Patient,    Type of CM/SW Visit: Initial Assessment  Primary Care Provider verified and updated as needed: Yes   Readmission within the last 30 days: no previous admission in last 30 days   Reason for Consult: discharge planning, health care directive, transportation  Advance Care Planning: Advance Care Planning Reviewed: questions answered        Communication Assessment  Patient's communication style: spoken language (English or Bilingual)        Cognitive  Cognitive/Neuro/Behavioral: WDL                      Living Environment:   People in home: significant other  Pt and girlfriend Dmitriy  Current living Arrangements: house      Able to return to prior arrangements: yes     Family/Social Support:  Care provided by: self, spouse/significant other (S/o assists as needed/desired)  Provides care for: no one  Marital Status: Lives with Significant Other  Significant Other       Dmitriy  Description of Support System: Supportive, Involved (She assists with I/ADL support if ever needed)    Support Assessment: Adequate family and caregiver support, Adequate social supports    Current Resources:   Patient receiving home care services: No  Community Resources: None  Equipment currently used at home: shower chair  Supplies currently used at home: None    Employment/Financial:  Employment Status: retired     Financial Concerns: none   Referral to Financial Worker: No     Does the patient's insurance plan have a 3 day qualifying hospital stay waiver?  No    Lifestyle & Psychosocial Needs:  Social Determinants of Health     Food Insecurity: Not on file   Depression: Not at risk (2/16/2024)    PHQ-2     PHQ-2 Score: 0   Housing Stability: Not on file   Tobacco Use: Medium Risk (2/16/2024)    Patient History     Smoking Tobacco Use: Former     Smokeless Tobacco Use: Former     Passive Exposure: Not on file   Financial Resource Strain: Not on file  "  Alcohol Use: Not on file   Transportation Needs: Not on file   Physical Activity: Not on file   Interpersonal Safety: Not on file   Stress: Not on file   Social Connections: Not on file     Functional Status:  Prior to admission patient needed assistance:   Dependent ADLs:: Independent  Dependent IADLs:: Independent  Assessment of Functional Status: At functional baseline    Mental Health Status:  Mental Health Status: No Current Concerns       Chemical Dependency Status:  Chemical Dependency Status: No Current Concerns           Values/Beliefs:  Spiritual, Cultural Beliefs, Yazidi Practices, Values that affect care: no (None identified)             Additional Information:  Writer met with pt to introduce Care Management(CM), obtain an initial assessment, and discuss discharge planning. Pt resides in a house with his girlfriend(s/o) Dmitriy who assists with I/ADL if needed. Pt states typical independence at baseline. No DME utilized, no in-home services utilized, and no concerns expressed in regard to eventual return home. Pt denies any CM related needs.    3/24 per LINDSAY Tucker.-\"72 y/o M with h/o severe aortic stenosis, HTN, HLP, CAD (s/p recent PCI), PAD, aortic arch PSA a/p TEVAR, PAD, among others who presetns with shortness of breath and some palpitations. Symptoms started today. On arrival sating 72 %, improved on 6 L. EKG sinus rhythm with nonspecific ST changes, no RANDI. Dx includes but not limited to ACS, arrhythmia, PE, pneumonia, viral infection, CHF, effusion, metabolic abnormality, pneumothorax among others.     Troponin 49, delta pending. Viral swabs negative. Hgb 8.2 (near prior of 9.2). CT PE scan showed no PE but evidence of multifocal groundglass opacities concerning for infectious/inflammatory process, some mediastinal and axillary adenopathy as well as mild bilateral pleural effusions concerning for volume overload. Will treat for both pneumonia and CHF with antibiotics and lasix. Admitted to " "the hospitalist for further management.\"    Therapy consults pending. Anticipate improvement and return home without issue. CM to follow-up on therapy consults and assist with service coordination needs as indicated/desired by the pt. S/o to transport pt home at time of discharge.    Venkata Galindo RN      "

## 2024-03-25 NOTE — DISCHARGE INSTRUCTIONS
Because you follow with the [facility], please make an appointment with them directly to follow up with a   heart failure cardiologist within 1-2 weeks after your discharge. Otherwise, if you'd prefer to follow with our advanced heart   failure provider, Dr. Braswell, let us know and we'd be happy to schedule an appointment at [phone].

## 2024-03-25 NOTE — PHARMACY-ADMISSION MEDICATION HISTORY
Pharmacist Admission Medication History    Admission medication history is complete. The information provided in this note is only as accurate as the sources available at the time of the update.    Information Source(s): Patient, Patient's pharmacy, Hospital records, and CareEverywhere/SureScripts via in-person    Pertinent Information:  Patient doesn't want to resume ALL the topical medication for his eczema while in the hospital.     Changes made to PTA medication list:  Added: Plavix, Lipitor, Lasix, Coreg, Tralokinumab-ldrm, Norvasc_reduced to 5mg from 10mg daily    Deleted: Lisinopril    Changed: ATARAX     Allergies reviewed with patient and updates made in EHR: yes    Medication History Completed By: Isaura Nina PharmD 3/25/2024 10:10 AM    PTA Med List   Medication Sig Last Dose    amLODIPine (NORVASC) 5 MG tablet Take 5 mg by mouth daily 3/24/2024 at am    atorvastatin (LIPITOR) 80 MG tablet Take 80 mg by mouth daily 3/24/2024 at pm    carvedilol (COREG) 12.5 MG tablet Take 12.5 mg by mouth 2 times daily 3/24/2024 at pm    clopidogrel (PLAVIX) 75 MG tablet Take 75 mg by mouth daily 3/24/2024 at am    desonide (DESOWEN) 0.05 % external ointment APPLY TOPICALLY TO THE AFFECTED AREA ON FACE AND SCALP TWICE DAILY AS NEEDED Unknown at prn    ferrous sulfate (FEROSUL) 325 (65 Fe) MG tablet Take 325 mg by mouth daily (with breakfast) 3/24/2024 at am    fexofenadine (ALLEGRA) 180 MG tablet Take 1 tablet (180 mg) by mouth 2 times daily 3/24/2024 at pm    folic acid (FOLVITE) 1 MG tablet Take 1 mg by mouth daily 3/24/2024 at am    furosemide (LASIX) 20 MG tablet Take 20 mg by mouth daily 3/24/2024 at am    furosemide (LASIX) 20 MG tablet Take 20 mg by mouth as needed (take extra 20mg as needed for weight gain) Unknown at prn    hydrOXYzine HCl (ATARAX) 25 MG tablet Take 25 mg by mouth at bedtime 3/24/2024 at pm    tacrolimus (PROTOPIC) 0.1 % external ointment Apply twice daily to areas of eczema 3/24/2024 at pm     tamsulosin (FLOMAX) 0.4 MG capsule Take 0.4 mg by mouth daily (with dinner) 3/24/2024 at supper    Tralokinumab-ldrm 150 MG/ML SOSY Inject 150 mg/mL Subcutaneous every 14 days 3/15/2024 at am    urea (GORMEL) 20 % external cream Apply twice daily to areas of flaking/scaling. 480g is one month supply 3/24/2024 at pm

## 2024-03-25 NOTE — CONSULTS
"      Cardiology Consult Note    Thank you, Dr. Reeves, for asking the [facility] Care team to see Junaid Cormier in consultation at [facility] to evaluate acute CHF exacerbation.      Assessment:   1.  Acute on chronic HFpEF, possibly due to acute pneumonia in the setting of severe aortic valve stenosis.  Agree with IV diuresis and initiation of antibiotic therapy.  2.  Severe aortic valve stenosis, awaiting TAVR at [facility]  3.  CAD, status post MAMADOU to the proximal-mid LAD March 8, 2024, currently on dual antiplatelet therapy.  States he has not missed any doses  4.  Bilateral pneumonia, suggested by chest CT.  Currently on broad-spectrum IV antibiotic therapy       Clinically Significant Risk Factors Present on Admission                # Drug Induced Platelet Defect: home medication list includes an antiplatelet medication   # Hypertension: Home medication list includes antihypertensive(s)      # Overweight: Estimated body mass index is 26.36 kg/m  as calculated from the following:    Height as of this encounter: 1.803 m (5' 11\").    Weight as of this encounter: 85.7 kg (189 lb).                Plan:   1.  Continue IV furosemide 40 mg every 12 hours with reevaluation tomorrow.  2.  IV antibiotics per primary service    Primary cardiologist: Dr. Karmen Porter, [facility] cardiology     Current History:   Mr. Junaid Cormier is a 73 year old male with history of severe aortic valve stenosis, currently undergoing TAVR evaluation at [facility], status post MAMADOU to the proximal-mid LAD on 3/8/2024 with mild disease elsewhere, chronic HFpEF who presented to the ED with complaints of palpitations and shortness of breath.  States he wears a Fitbit and noted his heart rate up around 80 to 90 bpm where it typically runs 60 to 70 bpm.  Also felt more short of breath.  Denied any fever, chills or pleuritic chest pain.  Presented to the ED where he was found to be hypoxemic with an O2 sat of 72% which " improved with 6 L.  ECG demonstrated sinus rhythm with nonspecific ST changes but no evidence of acute ST depression or elevation.  His initial troponin was 49 but it has since decreased.  Chest CT PE run demonstrated no evidence of pulmonary embolus but did show multifocal groundglass opacities, concerning for infectious/inflammatory process was some mediastinal and axillary adenopathy as well as mild bilateral pleural effusions consistent with CHF.  He has been started on IV antibiotics for possible community-acquired pneumonia along with IV diuretics and cardiac consult has been requested    Past Medical History:   -Severe aortic valve stenosis, currently undergoing workup for TAVR  -CAD, status post MAMADOU to the proximal/mid LAD  -Essential hypertension  -Chronic HFpEF  -Anemia  -Thoracic aortic aneurysm status post endograft    Past Surgical History:   History reviewed. No pertinent surgical history.    Family History:   No family history of premature CAD    Social History:    reports that he has quit smoking. He has quit using smokeless tobacco.    Meds:     Current Facility-Administered Medications:     acetaminophen (TYLENOL) tablet 650 mg, 650 mg, Oral, Q4H PRN **OR** acetaminophen (TYLENOL) Suppository 650 mg, 650 mg, Rectal, Q4H PRN, Shalini Rowell MD    calcium carbonate (TUMS) chewable tablet 1,000 mg, 1,000 mg, Oral, 4x Daily PRN, Shalini Rowell MD    furosemide (LASIX) injection 20 mg, 20 mg, Intravenous, Q12H, Shalini Rowell MD    lidocaine (LMX4) cream, , Topical, Q1H PRN, Shalini Rowell MD    lidocaine 1 % 0.1-1 mL, 0.1-1 mL, Other, Q1H PRN, Shalini Rowell MD    lidocaine 1% with EPINEPHrine 1:100,000 injection 3 mL, 3 mL, Intradermal, Once, Gilberto Virgen MD    magnesium oxide (MAG-OX) tablet 400 mg, 400 mg, Oral, Q4H, Shalini Rowell MD, 400 mg at 03/25/24 0346    melatonin tablet 1 mg, 1 mg, Oral, At Bedtime PRN, Shalini Rowell MD    piperacillin-tazobactam (ZOSYN)  3.375 g vial to attach to  mL bag, 3.375 g, Intravenous, Q8H, Shalini Rowell MD    senna-docusate (SENOKOT-S/PERICOLACE) 8.6-50 MG per tablet 1 tablet, 1 tablet, Oral, BID PRN **OR** senna-docusate (SENOKOT-S/PERICOLACE) 8.6-50 MG per tablet 2 tablet, 2 tablet, Oral, BID PRN, Shalini Rowell MD    sodium chloride (PF) 0.9% PF flush 3 mL, 3 mL, Intracatheter, Q8H, Shalini Rowell MD    sodium chloride (PF) 0.9% PF flush 3 mL, 3 mL, Intracatheter, q1 min prn, Shalini Rowell MD    Current Outpatient Medications:     aspirin (ASA) 81 MG EC tablet, Take 81 mg by mouth, Disp: , Rfl:     atorvastatin (LIPITOR) 80 MG tablet, , Disp: , Rfl:     augmented betamethasone dipropionate (DIPROLENE) 0.05 % external lotion, JEREMY EXT TO THE SCALP BID PRN, Disp: 60 mL, Rfl: 11    augmented betamethasone dipropionate (DIPROLENE-AF) 0.05 % external ointment, Apply topically 2 times daily, Disp: 100 g, Rfl: 11    cetirizine HCl 10 MG CAPS, , Disp: , Rfl:     desonide (DESOWEN) 0.05 % external ointment, APPLY TOPICALLY TO THE AFFECTED AREA ON FACE AND SCALP TWICE DAILY AS NEEDED, Disp: 60 g, Rfl: 3    dupilumab (DUPIXENT) 300 MG/2ML prefilled pen, Inject 4 mLs (600 mg) Subcutaneous See Admin Instructions for 1 dose, Disp: 4 mL, Rfl: 0    dupilumab (DUPIXENT) 300 MG/2ML prefilled pen, Inject 2 mLs (300 mg) Subcutaneous every 14 days, Disp: 4 mL, Rfl: 11    ferrous sulfate (FEROSUL) 325 (65 Fe) MG tablet, , Disp: , Rfl:     fexofenadine (ALLEGRA) 180 MG tablet, Take 1 tablet (180 mg) by mouth 2 times daily, Disp: 180 tablet, Rfl: 3    folic acid (FOLVITE) 1 MG tablet, Take 1 mg by mouth, Disp: , Rfl:     hydrOXYzine (ATARAX) 25 MG tablet, Take 1 tablet (25 mg) by mouth nightly as needed for itching, Disp: 30 tablet, Rfl: 3    lisinopril (ZESTRIL) 20 MG tablet, Take 30 mg by mouth, Disp: , Rfl:     tacrolimus (PROTOPIC) 0.1 % external ointment, Apply twice daily to areas of eczema, Disp: 100 g, Rfl: 11    tamsulosin  "(FLOMAX) 0.4 MG capsule, Take 0.4 mg by mouth, Disp: , Rfl:     urea (GORMEL) 20 % external cream, Apply twice daily to areas of flaking/scaling. 480g is one month supply (Patient not taking: Reported on 12/16/2022), Disp: 480 g, Rfl: 11   furosemide  20 mg Intravenous Q12H    lidocaine 1% with EPINEPHrine 1:100,000  3 mL Intradermal Once    magnesium oxide  400 mg Oral Q4H    piperacillin-tazobactam  3.375 g Intravenous Q8H    sodium chloride (PF)  3 mL Intracatheter Q8H       Allergies:   Patient has no known allergies.    Review of Systems:   A 12 point comprehensive review of systems was negative except as noted in the current history.    Objective:      Physical Exam  Body mass index is 26.36 kg/m .  /58   Pulse 67   Temp 98.2  F (36.8  C) (Oral)   Resp 22   Ht 1.803 m (5' 11\")   Wt 85.7 kg (189 lb)   SpO2 97%   BMI 26.36 kg/m      General Appearance:   Well-developed, elderly male who appears in no acute distress   HEENT:  Normocephalic, atraumatic.  Sclera nonicteric.  Mucous membranes moist   Neck: No jugular venous distention.  No carotid bruits.  Transmitted murmur heard in both carotids.   Chest: Symmetric with normal AP diameter   Lungs:   Bibasilar crackles otherwise clear   Cardiovascular:   Regular rhythm.  S1 normal, S2 reduced.  2/6 late peaking crescendo decrescendo systolic murmur heard at the left sternal border radiating to the apex and carotids.   Abdomen:  Soft, nondistended, nontender.  No organomegaly or mass   Extremities: No peripheral edema, clubbing or cyanosis   Skin: No rash or lesions   Neurologic: Alert and oriented x 3.  No gross focal deficit             Cardiographics (personally reviewed)  ECG demonstrates normal sinus rhythm with nonspecific ST-T changes.    Imaging (personally reviewed)  Echo ordered currently pending.    Lab Review (personally reviewed all results)  No results for input(s): \"CHOL\", \"HDL\", \"LDL\", \"TRIG\", \"CHOLHDLRATIO\" in the last 13110 hours.  No " "results for input(s): \"LDL\" in the last 04050 hours.  Recent Labs   Lab Test 03/25/24  0602      POTASSIUM 3.9   CHLORIDE 104   CO2 25   *   BUN 23.1*   CR 0.97   GFRESTIMATED 83   VONDA 9.0     Recent Labs   Lab Test 03/25/24  0602 03/24/24  2250   CR 0.97 0.99     No results for input(s): \"A1C\" in the last 86404 hours.       Recent Labs   Lab Test 03/25/24  0602   WBC 8.5   HGB 7.8*   HCT 24.8*   MCV 93        Recent Labs   Lab Test 03/25/24  0602 03/24/24  2250   HGB 7.8* 8.2*    No results for input(s): \"TROPONINI\" in the last 56104 hours.  Recent Labs   Lab Test 03/24/24  2250   NTBNPI 3,746*     Recent Labs   Lab Test 03/24/24  2250   TSH 1.77     No results for input(s): \"INR\" in the last 57051 hours.              "

## 2024-03-25 NOTE — ED TRIAGE NOTES
"Patient presents to the ED with c/o \"heart racing\" since this afternoon. Was found to be 70% on room air. Patient states he is having a TAVR in April. History of CHF, takes his lasix at home. Patient does report shortness of breath. Placed on 6L nasal cannula.        "

## 2024-03-25 NOTE — PHARMACY-ADMISSION MEDICATION HISTORY
Pharmacy Vancomycin Initial Note  Date of Service 2024  Patient's  1950  73 year old, male    Indication: Community Acquired Pneumonia    Current estimated CrCl = Estimated Creatinine Clearance: 84.7 mL/min (based on SCr of 0.97 mg/dL).    Creatinine for last 3 days  3/24/2024: 10:50 PM Creatinine 0.99 mg/dL  3/25/2024:  6:02 AM Creatinine 0.97 mg/dL    Recent Vancomycin Level(s) for last 3 days  No results found for requested labs within last 3 days.      Vancomycin IV Administrations (past 72 hours)                     vancomycin (VANCOCIN) 1,750 mg in 0.9% NaCl 500 mL intermittent infusion (mg) 1,750 mg Given 24 0305                    Nephrotoxins and other renal medications (From now, onward)      Start     Dose/Rate Route Frequency Ordered Stop    24 0000  vancomycin (VANCOCIN) 1,500 mg in 0.9% NaCl 250 mL intermittent infusion         1,500 mg  over 90 Minutes Intravenous EVERY 24 HOURS 24 1058      24 1100  furosemide (LASIX) injection 40 mg         40 mg  over 1-3 Minutes Intravenous EVERY 12 HOURS 24 1033      24 0800  piperacillin-tazobactam (ZOSYN) 3.375 g vial to attach to  mL bag         3.375 g  over 240 Minutes Intravenous EVERY 8 HOURS 24 0255              Contrast Orders - past 72 hours (72h ago, onward)      Start     Dose/Rate Route Frequency Stop    24 0030  iopamidol (ISOVUE-370) solution 100 mL         100 mL Intravenous ONCE 24 0028          Loading dose: 1750  Regimen: 1500 mg IV every 24 hours.  Start time: 03:05 on 2024  Exposure target: AUC24 (range)400-600 mg/L.hr   AUC24,ss: 440 mg/L.hr  Probability of AUC24 > 400: 61 %  Ctrough,ss: 12.1 mg/L  Probability of Ctrough,ss > 20: 14 %  Probability of nephrotoxicity (Lodise ANGELINA ): 7 %  Plan:  Start vancomycin  1500 mg IV q24 h.   Vancomycin monitoring method: AUC  Vancomycin therapeutic monitoring goal: 400-600 mg*h/L  Pharmacy will check vancomycin levels  as appropriate in 1-3 Days.    Serum creatinine levels will be ordered daily for the first week of therapy and at least twice weekly for subsequent weeks.      Juve GaryD

## 2024-03-25 NOTE — H&P
[facility] MEDICINE ADMISSION HISTORY AND PHYSICAL       Assessment & Plan      Present on Admission (POA)    1. Possible multifocal PNA (other than SOB, no fever, no WBC and no cough) vs CHF with bilateral mild miya effusions on CT.     However, unclear what triggered his CHF exacerbation - ?ischemia, infection or his valve    - Continue zosyn  for now on going work up -- if negative, either stop or tailor.   - Did not appear septic. And no MRSA risk -- will hold further vancomycin -- if more s/s to suggest MRSA infection, will defer to AM doc to resume    - cultures, antigens, PCR, MRSA   - tele and pulse ox  - CBC/procal/BMP    2. Acute CHF exacerbation, with mild trop leak - Could be demand ischemia, or CHF or ACS -- had a recent C.  Of note,  has severe aortic stenosis, with plans for TAVR     - cardiology evaluation   - lasix BID - was given 40 mgs in ED     3. Hgb of 8.2 (from 9+)  - seeing oncology for Anemia -- Per recent Onco notes --- Suspect that his anemia is related to his previous decline in iron, vit B12, folic acid levels. However, this has worsened since he initially saw me. Suspect that this is related to his anemia of chronic disease as opposed to an underlying bone marrow disorder. However, If he has worsening of his hemoglobin in the future, would repeat a peripheral blood morphology and consider a bone marrow biopsy but will hold on these test at this time.    - CBC in AM  - If Hgb less than 8 consider PRBC - consent obtained already. We did talk about B/R/C/A such as iron - and he prefer PRBC    Chronic Medical Conditions    1. HTN  2. History of thoracic aortic disease status post multiple endovascular interventions       VTE prophylaxis --  SCDs, if appropriate, add SQH  Diet --  Cardiac diet  Code Status -- Full  Barriers to discharge -- Admitting/Acute medical condition/s  Discharge Disposition and goals --  Unable to determine at this point, pending  progress/treatment response.     PPE - I was wearing PPE including but not limited to - N95 mask, Gloves, and/or Safety glasses.  Admission Status -- Observation, Inpatient     Care plan is based on available information and patient's condition at encounter. Care plan was discussed and agreed to proceed by the patient/family member. Made aware that care plan may change.     I recommend to review/revise history/care plan for information/results not available during my encounter. All or some home medication/s were not resumed or was modified on admission due to safety concerns. Will have rounding AM doc  review safety to resume held/modified home medications.     Availability -- If need to coordinate care after night shift  -- Contact assigned AM rounding Hospitalist.     75 minutes spent by me on the date of service doing chart review, history, exam, diagnostic test results interpretation, care coordination with RN, RT and/or Pharm, & further activities per the note.      Ez Rowell MD, MPH, FACP, [facility]  Internal Medicine - Hospitalist        Chief Complaint SOB      HISTORY     - Patient was seen in ED - 11  - He is here with SOB and palpitations. He was ready to get up and pee after he was given lasix in ED. He did pee OK and seems his breathing is better. Still on O2.     - He said, his SOB just started in 24 hrs. Denies cough or fevers. Lives with GF and she is not sick. No CP or belly pain. No travels.     - He has been seeing MDs for TAVR -- after he was found to have severe aortic stenosis. He states he is relatively asymptomatic per MDs.    - ED course/treatments/referral - No PE but has Multifocal groundglass opacities within the lingula, right upper lobe, and superior segment of the right lower lobe to lesser degree concerning for developing multifocal infectious and/or inflammatory process. Also has CHF      - Was given lasix and antibiotics - zosyn and vanco. BNP is over 3K.     - ROS --- No headache.  No dizziness. No weakness. No palpitations. No abdominal pain. No nausea or vomiting. No urinary symptoms. No bleeding symptoms. No weight loss. Rest of 12 point ROS was reviewed and negative.       Past Medical History     History reviewed. No pertinent past medical history.      Surgical History     History reviewed. No pertinent surgical history.     Family History      No family history on file.      Social History      .  Social History     Socioeconomic History    Marital status: Single     Spouse name: Not on file    Number of children: Not on file    Years of education: Not on file    Highest education level: Not on file   Occupational History    Not on file   Tobacco Use    Smoking status: Former    Smokeless tobacco: Former   Vaping Use    Vaping Use: Never used   Substance and Sexual Activity    Alcohol use: Not on file    Drug use: Not on file    Sexual activity: Not on file   Other Topics Concern    Not on file   Social History Narrative    Not on file     Social Determinants of Health     Financial Resource Strain: Not on file   Food Insecurity: Not on file   Transportation Needs: Not on file   Physical Activity: Not on file   Stress: Not on file   Social Connections: Not on file   Interpersonal Safety: Not on file   Housing Stability: Not on file          Allergies      No Known Allergies      Prior to Admission Medications      lidocaine 1% with EPINEPHrine 1:100,000 injection 3 mL    aspirin (ASA) 81 MG EC tablet, Take 81 mg by mouth  atorvastatin (LIPITOR) 80 MG tablet,   augmented betamethasone dipropionate (DIPROLENE) 0.05 % external lotion, JEREMY EXT TO THE SCALP BID PRN  augmented betamethasone dipropionate (DIPROLENE-AF) 0.05 % external ointment, Apply topically 2 times daily  cetirizine HCl 10 MG CAPS,   desonide (DESOWEN) 0.05 % external ointment, APPLY TOPICALLY TO THE AFFECTED AREA ON FACE AND SCALP TWICE DAILY AS NEEDED  dupilumab (DUPIXENT) 300 MG/2ML prefilled pen, Inject 4 mLs (600 mg)  Subcutaneous See Admin Instructions for 1 dose  dupilumab (DUPIXENT) 300 MG/2ML prefilled pen, Inject 2 mLs (300 mg) Subcutaneous every 14 days  ferrous sulfate (FEROSUL) 325 (65 Fe) MG tablet,   fexofenadine (ALLEGRA) 180 MG tablet, Take 1 tablet (180 mg) by mouth 2 times daily  folic acid (FOLVITE) 1 MG tablet, Take 1 mg by mouth  hydrOXYzine (ATARAX) 25 MG tablet, Take 1 tablet (25 mg) by mouth nightly as needed for itching  lisinopril (ZESTRIL) 20 MG tablet, Take 30 mg by mouth  tacrolimus (PROTOPIC) 0.1 % external ointment, Apply twice daily to areas of eczema  tamsulosin (FLOMAX) 0.4 MG capsule, Take 0.4 mg by mouth  urea (GORMEL) 20 % external cream, Apply twice daily to areas of flaking/scaling. 480g is one month supply (Patient not taking: Reported on 12/16/2022)            Review of Systems     A 12 point comprehensive review of systems was negative except as noted above in HPI.    PHYSICAL EXAMINATION       Vitals      Vitals: /61 (BP Location: Right arm, Patient Position: Semi-Muhammad's, Cuff Size: Adult Regular)   Pulse 59   Temp 98.2  F (36.8  C) (Oral)   Resp 27   Wt 85.7 kg (189 lb)   SpO2 96%   BMI 25.63 kg/m    BMI= Body mass index is 25.63 kg/m .      Examination     General Appearance:  Alert, cooperative, no distress  Head:    Normocephalic, without obvious abnormality, atraumatic  EENT:  PERRL, conjunctiva/corneas clear, EOM's intact.   Neck:   Supple, symmetrical, trachea midline, no adenopathy; no NVE  Back:  Symmetric, no curvature, no CVA tenderness  Chest/Lungs: decreased air entry, no rales or wheeze. No tenderness or deformity. No abdominal breathing or use of accessory muscles.   Heart:    Regular rate and rhythm, S1 and S2 normal, 2/6 SM at LPSB  Abdomen: Soft, non-tender, bowel sounds active all four quadrants, not peritoneal on palpation. Not distended  Extremities:  Extremities normal, atraumatic, no swelling   Skin:  Skin color, texture, turgor normal, no rashes or  lesion  Neurologic:  Awake and alert,  No lateralizing or localizing signs           Pertinent Lab     Results for orders placed or performed during the hospital encounter of 03/24/24   XR Chest Port 1 View    Impression    IMPRESSION: Single AP view of the chest was obtained. Postprocedural changes of thoracic aortic endograft. Cardiomediastinal silhouette is within normal limits. Mild left basilar pulmonary opacities, could represent atelectasis versus infection. No   significant pleural effusion or pneumothorax. Multiple surgical clips in the left lower neck area.   CT Chest Pulmonary Embolism w Contrast    Impression    IMPRESSION:  1.  No evidence of Pulmonary embolus to the segmental level.  2.  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  3.  Multifocal groundglass opacities within the lingula, right upper lobe, and superior segment of the right lower lobe to lesser degree concerning for developing multifocal infectious and/or inflammatory process.   4.  Shotty mediastinal and axillary adenopathy, favored reactive in nature, although the degree of lymphadenopathy fetal somewhat out of proportion with the pulmonary findings correlation for symptoms of a systemic infection is recommended.  5.  Mild bilateral pleural effusions with apical septal thickening, concerning for volume overload/interstitial edema     Comprehensive metabolic panel   Result Value Ref Range    Sodium 137 135 - 145 mmol/L    Potassium 4.2 3.4 - 5.3 mmol/L    Carbon Dioxide (CO2) 22 22 - 29 mmol/L    Anion Gap 11 7 - 15 mmol/L    Urea Nitrogen 23.8 (H) 8.0 - 23.0 mg/dL    Creatinine 0.99 0.67 - 1.17 mg/dL    GFR Estimate 81 >60 mL/min/1.73m2    Calcium 9.2 8.8 - 10.2 mg/dL    Chloride 104 98 - 107 mmol/L    Glucose 165 (H) 70 - 99 mg/dL    Alkaline Phosphatase 122 40 - 150 U/L    AST 20 0 - 45 U/L    ALT 10 0 - 70  U/L    Protein Total 6.8 6.4 - 8.3 g/dL    Albumin 3.9 3.5 - 5.2 g/dL    Bilirubin Total 0.3 <=1.2 mg/dL   Troponin T, High Sensitivity (now)   Result Value Ref Range    Troponin T, High Sensitivity 49 (H) <=22 ng/L   Nt probnp inpatient   Result Value Ref Range    N terminal Pro BNP Inpatient 3,746 (H) 0 - 900 pg/mL   Symptomatic Influenza A/B, RSV, & SARS-CoV2 PCR (COVID-19) Nasopharyngeal    Specimen: Nasopharyngeal; Swab   Result Value Ref Range    Influenza A PCR Negative Negative    Influenza B PCR Negative Negative    RSV PCR Negative Negative    SARS CoV2 PCR Negative Negative   CBC with platelets and differential   Result Value Ref Range    WBC Count 8.1 4.0 - 11.0 10e3/uL    RBC Count 2.78 (L) 4.40 - 5.90 10e6/uL    Hemoglobin 8.2 (L) 13.3 - 17.7 g/dL    Hematocrit 26.1 (L) 40.0 - 53.0 %    MCV 94 78 - 100 fL    MCH 29.5 26.5 - 33.0 pg    MCHC 31.4 (L) 31.5 - 36.5 g/dL    RDW 17.7 (H) 10.0 - 15.0 %    Platelet Count 217 150 - 450 10e3/uL    % Neutrophils 70 %    % Lymphocytes 13 %    % Monocytes 5 %    % Eosinophils 12 %    % Basophils 1 %    % Immature Granulocytes 0 %    NRBCs per 100 WBC 0 <1 /100    Absolute Neutrophils 5.6 1.6 - 8.3 10e3/uL    Absolute Lymphocytes 1.0 0.8 - 5.3 10e3/uL    Absolute Monocytes 0.4 0.0 - 1.3 10e3/uL    Absolute Eosinophils 1.0 (H) 0.0 - 0.7 10e3/uL    Absolute Basophils 0.1 0.0 - 0.2 10e3/uL    Absolute Immature Granulocytes 0.0 <=0.4 10e3/uL    Absolute NRBCs 0.0 10e3/uL   Extra Blue Top Tube   Result Value Ref Range    Hold Specimen JIC    Extra Red Top Tube   Result Value Ref Range    Hold Specimen JIC    Result Value Ref Range    Magnesium 1.8 1.7 - 2.3 mg/dL   TSH with free T4 reflex   Result Value Ref Range    TSH 1.77 0.30 - 4.20 uIU/mL           Pertinent Radiology

## 2024-03-25 NOTE — PHARMACY-VANCOMYCIN DOSING SERVICE
Pharmacy Vancomycin Initial Note  Date of Service 2024  Patient's  1950  73 year old, male    Indication: Community Acquired Pneumonia    Current estimated CrCl = CrCl cannot be calculated (Unknown ideal weight.).    Creatinine for last 3 days  3/24/2024: 10:50 PM Creatinine 0.99 mg/dL    Recent Vancomycin Level(s) for last 3 days  No results found for requested labs within last 3 days.      Vancomycin IV Administrations (past 72 hours)        No vancomycin orders with administrations in past 72 hours.                    Nephrotoxins and other renal medications (From now, onward)      Start     Dose/Rate Route Frequency Ordered Stop    24 0200  piperacillin-tazobactam (ZOSYN) 3.375 g vial to attach to  mL bag         3.375 g  over 30 Minutes Intravenous ONCE 24 0138      24 0200  furosemide (LASIX) injection 40 mg         40 mg  over 1-3 Minutes Intravenous ONCE 24 0138      24 0200  vancomycin (VANCOCIN) 1,750 mg in 0.9% NaCl 500 mL intermittent infusion         1,750 mg  over 2 Hours Intravenous ONCE 24 0140              Contrast Orders - past 72 hours (72h ago, onward)      Start     Dose/Rate Route Frequency Stop    24 0030  iopamidol (ISOVUE-370) solution 100 mL         100 mL Intravenous ONCE 24 0028                Plan:  Start vancomycin 1750mg x 1 dose in ER    Corwin Guerrero [facility]

## 2024-03-25 NOTE — PROGRESS NOTES
Patient A&O x4 and pleasant. VSS on RA. Tele reads sinus rhythm w/ prolonged QTc. K and Mag protocols addressed, AM recheck for K, waiting for mag lab results. Pain is denied. Patient ambulates SBA. PIV is patent and saline locked. Tolerating oral intake and voiding appropriately. Last BM on 3/25. Patient resting in bed. Patient is safe, call light within reach.    Mia Myrick RN

## 2024-03-25 NOTE — PROGRESS NOTES
[facility]    Medicine Progress Note - Hospitalist Service    Date of Admission:  3/24/2024    Assessment & Plan   73-year-old male with history of severe aortic stenosis, hypertension, hyperlipidemia, coronary disease, peripheral arterial disease, who presented with complaints of shortness of breath and palpitations.  Diagnosed with acute on chronic heart failure with preserved ejection fraction, and possible bilateral community-acquired pneumonia.    Acute on chronic heart failure with preserved ejection fraction  Bilateral pleural effusions  FRANSISCA from 2/2024 with LVEF of 55 to 60%.  Moderate severe atherosclerosis of the descending thoracic aorta, and moderate aortic regurgitation.  Echocardiogram 3/25: LVEF 55-60%, severe aortic stenosis.  N-terminal proBNP 3746  Troponin T is elevated but stable at 49 and 48.  TSH normal at 1.77.  Continue furosemide 20 mg IV every 12 hours  Magnesium potassium replacement protocols.  Appreciate cardiology recommendations    Bilateral infiltrates possible community-acquired pneumonia  Acute respiratory failure with hypoxia resolved  Procalcitonin 0.09  White blood cell count normal at 8.5.  SARS-CoV-2 PCR, influenza and RSV negative.  Respiratory panel in process.  MRSA culture in process  Zosyn 3.375 g IV every 8 hours, vancomycin was given 3/25  Await MRSA culture    Normocytic anemia  History of iron deficiency anemia  Continue iron supplementation  Repeat CBC in AM.  CT chest reveals multifocal groundglass opacities in bilateral lung segments.    Essential hypertension  Hyperlipidemia  Will resume PTA medication once med rec is completed    Coronary disease and peripheral arterial disease  Severe aortic stenosis  Patient plans are eventual TAVR at [facility].          Diet: Combination Diet Low Saturated Fat Na <2400mg Diet, No Caffeine Diet    DVT Prophylaxis: Enoxaparin (Lovenox) SQ  [place] Catheter: Not present  Lines: None     Cardiac Monitoring:  "None  Code Status: Full Code      Clinically Significant Risk Factors Present on Admission                # Drug Induced Platelet Defect: home medication list includes an antiplatelet medication   # Hypertension: Home medication list includes antihypertensive(s)      # Overweight: Estimated body mass index is 26.36 kg/m  as calculated from the following:    Height as of this encounter: 1.803 m (5' 11\").    Weight as of this encounter: 85.7 kg (189 lb).              Disposition Plan      Expected Discharge Date: 03/27/2024                    Blane Reeves DO  Hospitalist Service  [facility]  Securely message with [facility] (more info)  Text page via [facility] Paging/Directory   ______________________________________________________________________    Interval History   Patient is no longer requiring supplemental oxygen at this time.  He denies shortness of breath at rest.  He denies chest pain or palpitations at this time.  Denies orthopnea.    Physical Exam   Vital Signs: Temp: 97.3  F (36.3  C) Temp src: Oral BP: 137/63 Pulse: 77   Resp: 20 SpO2: 94 % O2 Device: None (Room air) Oxygen Delivery: 4 LPM  Weight: 189 lbs 0 oz    General Appearance: Elderly gentleman laying in bed no apparent distress.  Respiratory: Diminished breath sounds at bases, crackles mid lung fields.  Cardiovascular: Regular rate and rhythm.  2/6 systolic murmur best heard over left upper sternal border.  No thrill.  No gallop.  GI: Nondistended, normoactive bowel sounds, soft to palpation.  Skin: Skin is dry no exanthem on exposed skin.  Other: Awake alert oriented x 3.  General sensation intact all extremities.  Distal pulses normal.    Medical Decision Making       50 MINUTES SPENT BY ME on the date of service doing chart review, history, exam, documentation & further activities per the note.      Data     I have personally reviewed the following data over the past 24 hrs:    8.5  \   7.8 (L)   / 207     138 104 23.1 " (H) /  112 (H)   3.9 25 0.97 \     ALT: 5 AST: 18 AP: 109 TBILI: 0.4   ALB: 3.7 TOT PROTEIN: 6.4 LIPASE: N/A     Trop: 48 (H) BNP: 3,746 (H)     TSH: 1.77 T4: N/A A1C: N/A     Procal: 0.09 CRP: N/A Lactic Acid: N/A         Imaging results reviewed over the past 24 hrs:   Recent Results (from the past 24 hour(s))   XR Chest Port 1 View    Narrative    EXAM: XR CHEST PORT 1 VIEW  LOCATION: [facility]  DATE: 3/24/2024    INDICATION: Shortness of breath.  COMPARISON: Chest x-ray on 01/30/2024.      Impression    IMPRESSION: Single AP view of the chest was obtained. Postprocedural changes of thoracic aortic endograft. Cardiomediastinal silhouette is within normal limits. Mild left basilar pulmonary opacities, could represent atelectasis versus infection. No   significant pleural effusion or pneumothorax. Multiple surgical clips in the left lower neck area.   CT Chest Pulmonary Embolism w Contrast    Narrative    EXAM: CT CHEST PULMONARY EMBOLISM W CONTRAST  LOCATION: [facility]  DATE: 3/25/2024    INDICATION: shortness of breath, hypoxia  COMPARISON: 07/07/2023 and 01/24/2024  TECHNIQUE: CT chest pulmonary angiogram during arterial phase injection of IV contrast. Multiplanar reformats and MIP reconstructions were performed. Dose reduction techniques were used.   CONTRAST: 100 mL of Omnipaque 350 contrast.    FINDINGS:  ANGIOGRAM CHEST: Pulmonary arteries are normal caliber and negative for pulmonary emboli. A an aortic endograft is again seen originating from the aortic arch into the proximal descending thoracic aorta with embolization coil seen in the origin of the   left subclavian artery, similar to prior exam. 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 There is no evidence of aortic dissection. No CT evidence of right heart strain.    LUNGS AND PLEURA: Small bilateral pleural effusions are present. Patchy groundglass opacities are seen in the  lungs bilaterally, most pronounced in the right upper lobe and lingula as well as the right lower lobe superior segment lesser degree..   Concerning for developing multifocal infectious and/or inflammatory process. Additionally there is septal emphysema seen in the lung apices bilaterally.    MEDIASTINUM/AXILLAE: Aortic valvular and mitral annular calcifications are again noted, unchanged. The heart is normal in size. No pericardial effusion. Poststernotomy mediastinal lymph nodes are again noted, slightly increased in size and number from   prior exam favored to be reactive in nature. 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  Adenopathy is also present.     CORONARY ARTERY CALCIFICATION: Moderate.    UPPER ABDOMEN: Cholelithiasis    MUSCULOSKELETAL: No concerning bone lesions.      Impression    IMPRESSION:  1.  No evidence of Pulmonary embolus to the segmental level.  2.  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  3.  Multifocal groundglass opacities within the lingula, right upper lobe, and superior segment of the right lower lobe to lesser degree concerning for developing multifocal infectious and/or inflammatory process.   4.  Shotty mediastinal and axillary adenopathy, favored reactive in nature, although the degree of lymphadenopathy fetal somewhat out of proportion with the pulmonary findings correlation for symptoms of a systemic infection is recommended.  5.  Mild bilateral pleural effusions with apical septal thickening, concerning for volume overload/interstitial edema

## 2024-03-25 NOTE — ED PROVIDER NOTES
NAME: Junaid Cormier  AGE: 73 year old male  YOB: 1950  MRN: [id number]  EVALUATION DATE & TIME: 3/24/2024 10:39 PM    PCP: Art Vazquez  ED PROVIDER: Josseline Alva MD.    Chief Complaint   Patient presents with    Shortness of Breath    Palpitations       FINAL IMPRESSION:  1. Shortness of breath    2. Palpitations        MEDICAL DECISION MAKING:    10:40 PM I met with the patient, obtained history, performed an initial exam, and discussed options and plan for diagnostics and treatment here in the ED.   1:23 AM I updated patient.  1:38 AM Spoke with Dr. Rowell, hospitalist. Accepts patient for admission.    72 y/o M with h/o severe aortic stenosis, HTN, HLP, CAD (s/p recent PCI), PAD, aortic arch PSA a/p TEVAR, PAD, among others who presetns with shortness of breath and some palpitations. Symptoms started today. On arrival sating 72 %, improved on 6 L. EKG sinus rhythm with nonspecific ST changes, no RANDI. Dx includes but not limited to ACS, arrhythmia, PE, pneumonia, viral infection, CHF, effusion, metabolic abnormality, pneumothorax among others.    Troponin 49, delta pending. Viral swabs negative. Hgb 8.2 (near prior of 9.2). CT PE scan showed no PE but evidence of multifocal groundglass opacities concerning for infectious/inflammatory process, some mediastinal and axillary adenopathy as well as mild bilateral pleural effusions concerning for volume overload. Will treat for both pneumonia and CHF with antibiotics and lasix. Admitted to the hospitalist for further management.       Medical Decision Making  Obtained supplemental history:Supplemental history obtained?: No  Reviewed external records: External records reviewed?: Documented in chart  Care impacted by chronic illness:Heart Disease and Other: s/p angioplasty with stent, chronic systolic heart failure, JOE   Care significantly affected by social determinants of health:N/A  Did you consider but not order tests?: Work up considered  but not performed and documented in chart, if applicable  Did you interpret images independently?: Independent interpretation of ECG and images noted in documentation, when applicable.  Consultation discussion with other provider:Did you involve another provider (consultant, , pharmacy, etc.)?: No  Admit.      Critical Care  Performed by: Josseline Alva MD  Authorized by: Josseline Alva MD     Total critical care time: 45 minutes  Critical care time was exclusive of separately billable procedures and treating other patients.  Critical care was necessary to treat or prevent imminent or life-threatening deterioration of the following conditions: acute respiratory failure (72%)  Critical care was time spent personally by me on the following activities: development of treatment plan with patient or surrogate, discussions with consultants, examination of patient, evaluation of patient's response to treatment, obtaining history from patient or surrogate, ordering and performing treatments and interventions, ordering and review of laboratory studies, ordering and review of radiographic studies and re-evaluation of patient's condition, this excludes any separately billable procedures.      MEDICATIONS GIVEN IN THE EMERGENCY:  Medications   piperacillin-tazobactam (ZOSYN) 3.375 g vial to attach to  mL bag (has no administration in time range)   furosemide (LASIX) injection 40 mg (has no administration in time range)   iopamidol (ISOVUE-370) solution 100 mL (100 mLs Intravenous $Given 3/25/24 0028)       NEW PRESCRIPTIONS STARTED AT TODAY'S ER VISIT:  New Prescriptions    No medications on file        =================================================================  HPI    Patient information was obtained from: Patient  Use of : N/A     Junaid Cormier is a 73 year old male with a past medical history of hronic systolic heart failure, 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    Patient reports that today, he felt his heart racing with accompanied shortness of breath. Denies chest pain. He reports that his heart rate is normally in the 60s and noted that it was elevated in the 80s. He endorses that his breathing feels similar to when he was diagnosed with CHF 6 months ago.  He has not noticed any leg swelling today which he did have swelling with his CHF. In triage, patient was found to be on 70% RA and is requiring oxygen. He is not chronically on oxygen at home.     Patient reports that he recently had an angiogram and lab work-up done because he is supposed to have a TAVR in April.     He denies fevers, cough, abdominal pain, and any other symptoms. He denies a history of COPD or asthma.    Per chart review, patient was admitted at [facility] for acute on chronic CHF from 12/30-1/2/24. Patient initally presented to the urgency room for progressively worsening dyspnea on exertion. Patient was admitted 12/17-12/22 for JOE, wrist infection, and gouty arthritis in left knee. He was slightly overloaded during that hospitalization and he was given some lasix prior to discharge.  In the ED, patient was hypertensive with /85, Hgb 9.9, creat 0.64, troponin 1 HS 0.102 Pro-BNP 12,100. D-dimer was 2.43. CTA chest revealed no PE but with cardiomegaly and increased interstitial and alveolar edema, worsening bilateral pleural effusion. Also noted was an indeterminate 3.1 cm mass arising from the posterior aspect of the hepatic. Patient diuresed well. Orthopedics was consulted. Wrist infection did not appear to worse and WBC was normal. Discharged.      PAST MEDICAL HISTORY:  History reviewed. No pertinent past medical history.    PAST SURGICAL HISTORY:  History reviewed. No pertinent surgical history.    CURRENT MEDICATIONS:       Current Facility-Administered Medications:     furosemide (LASIX) injection 40 mg, 40 mg, Intravenous, Once, Josseline Alva MD    lidocaine 1% with EPINEPHrine 1:100,000 injection 3 mL, 3 mL, Intradermal, Once, Gilberto Virgen MD    piperacillin-tazobactam (ZOSYN) 3.375 g vial to attach to  mL bag, 3.375 g, Intravenous, Once, Josseline Alva MD    Current Outpatient Medications:     aspirin (ASA) 81 MG EC tablet, Take 81 mg by mouth, Disp: , Rfl:     atorvastatin (LIPITOR) 80 MG tablet, , Disp: , Rfl:     augmented betamethasone dipropionate (DIPROLENE) 0.05 % external lotion, JEREMY EXT TO THE SCALP BID PRN, Disp: 60 mL, Rfl: 11    augmented betamethasone dipropionate (DIPROLENE-AF) 0.05 % external ointment, Apply topically 2 times daily, Disp: 100 g, Rfl: 11    cetirizine HCl 10 MG CAPS, , Disp: , Rfl:     desonide (DESOWEN) 0.05 % external ointment, APPLY TOPICALLY TO THE AFFECTED AREA ON FACE AND SCALP TWICE DAILY AS NEEDED, Disp: 60 g, Rfl: 3    dupilumab (DUPIXENT) 300 MG/2ML prefilled pen, Inject 4 mLs (600 mg) Subcutaneous See Admin Instructions for 1 dose, Disp: 4 mL, Rfl: 0    dupilumab (DUPIXENT) 300 MG/2ML prefilled pen, Inject 2 mLs (300 mg) Subcutaneous every 14 days, Disp: 4 mL, Rfl: 11    ferrous sulfate (FEROSUL) 325 (65 Fe) MG tablet, , Disp: , Rfl:     fexofenadine (ALLEGRA) 180 MG tablet, Take 1 tablet (180 mg) by mouth 2 times daily, Disp: 180 tablet, Rfl: 3    folic acid (FOLVITE) 1 MG tablet, Take 1 mg by mouth, Disp: , Rfl:     hydrOXYzine (ATARAX) 25 MG tablet, Take 1 tablet (25 mg) by mouth nightly as needed for itching, Disp: 30 tablet, Rfl: 3    lisinopril (ZESTRIL) 20 MG tablet, Take 30 mg by mouth, Disp: , Rfl:     tacrolimus (PROTOPIC) 0.1 % external ointment, Apply twice daily to areas of eczema, Disp: 100 g, Rfl: 11    tamsulosin (FLOMAX) 0.4 MG capsule, Take 0.4 mg by mouth, Disp: , Rfl:     urea (GORMEL) 20 % external cream, Apply twice daily to areas of flaking/scaling.  480g is one month supply (Patient not taking: Reported on 12/16/2022), Disp: 480 g, Rfl: 11    ALLERGIES:  No Known Allergies    FAMILY HISTORY:  No family history on file.    SOCIAL HISTORY:   Social History     Socioeconomic History    Marital status: Single   Tobacco Use    Smoking status: Former    Smokeless tobacco: Former   Vaping Use    Vaping Use: Never used       PHYSICAL EXAM:    Vitals: /61 (BP Location: Right arm, Patient Position: Semi-Muhammad's, Cuff Size: Adult Regular)   Pulse 64   Temp 98.2  F (36.8  C) (Oral)   Resp 28   Wt 85.7 kg (189 lb)   SpO2 96%   BMI 25.63 kg/m     Constitutional: Elderly male, no acute distress  HENT: Normocephalic, atraumatic. Neck-gross ROM intact.   Eyes: Pupils mid-range, sclera white  Respiratory: CTAB, no respiratory distress on 6 L  Cardiovascular: Normal heart rate, regular rhythm. No lower extremity edema  GI: Soft, not distended, non tender, no guarding  Musculoskeletal: Moving extremities intentionally and without pain. No obvious deformity.  Skin: Warm, dry, no rash.  Neurologic: Alert & oriented, speech clear, no focal deficits noted    LAB:  All pertinent labs reviewed and interpreted.  Labs Ordered and Resulted from Time of ED Arrival to Time of ED Departure   COMPREHENSIVE METABOLIC PANEL - Abnormal       Result Value    Sodium 137      Potassium 4.2      Carbon Dioxide (CO2) 22      Anion Gap 11      Urea Nitrogen 23.8 (*)     Creatinine 0.99      GFR Estimate 81      Calcium 9.2      Chloride 104      Glucose 165 (*)     Alkaline Phosphatase 122      AST 20      ALT 10      Protein Total 6.8      Albumin 3.9      Bilirubin Total 0.3     TROPONIN T, HIGH SENSITIVITY - Abnormal    Troponin T, High Sensitivity 49 (*)    NT PROBNP INPATIENT - Abnormal    N terminal Pro BNP Inpatient 3,746 (*)    CBC WITH PLATELETS AND DIFFERENTIAL - Abnormal    WBC Count 8.1      RBC Count 2.78 (*)     Hemoglobin 8.2 (*)     Hematocrit 26.1 (*)     MCV 94      MCH  29.5      MCHC 31.4 (*)     RDW 17.7 (*)     Platelet Count 217      % Neutrophils 70      % Lymphocytes 13      % Monocytes 5      % Eosinophils 12      % Basophils 1      % Immature Granulocytes 0      NRBCs per 100 WBC 0      Absolute Neutrophils 5.6      Absolute Lymphocytes 1.0      Absolute Monocytes 0.4      Absolute Eosinophils 1.0 (*)     Absolute Basophils 0.1      Absolute Immature Granulocytes 0.0      Absolute NRBCs 0.0     INFLUENZA A/B, RSV, & SARS-COV2 PCR - Normal    Influenza A PCR Negative      Influenza B PCR Negative      RSV PCR Negative      SARS CoV2 PCR Negative     MAGNESIUM - Normal    Magnesium 1.8     TSH WITH FREE T4 REFLEX - Normal    TSH 1.77     TROPONIN T, HIGH SENSITIVITY   BLOOD CULTURE   BLOOD CULTURE   RESPIRATORY PANEL PCR   MRSA MSSA PCR, NASAL SWAB       RADIOLOGY:  CT Chest Pulmonary Embolism w Contrast   Final Result   IMPRESSION:   1.  No evidence of Pulmonary embolus to the segmental level.   2.  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   3.  Multifocal groundglass opacities within the lingula, right upper lobe, and superior segment of the right lower lobe to lesser degree concerning for developing multifocal infectious and/or inflammatory process.    4.  Shotty mediastinal and axillary adenopathy, favored reactive in nature, although the degree of lymphadenopathy fetal somewhat out of proportion with the pulmonary findings correlation for symptoms of a systemic infection is recommended.   5.  Mild bilateral pleural effusions with apical septal thickening, concerning for volume overload/interstitial edema         XR Chest Port 1 View   Final Result   IMPRESSION: Single AP view of the chest was obtained. Postprocedural changes of thoracic aortic endograft. Cardiomediastinal silhouette is within normal limits. Mild left basilar pulmonary  opacities, could represent atelectasis versus infection. No    significant pleural effusion or pneumothorax. Multiple surgical clips in the left lower neck area.          EKG:   Performed at: 03/24/24 5493  Impression: Sinus rhythm. Nonspecific ST and T wave abnormality.  Rate: 69 BPM  Rhythm: Sinus rhythm  QRS Interval: 100 ms  QTc Interval: 467 ms  Comparison: None  I have independently reviewed and interpreted the EKG(s) documented above.     PROCEDURES:   Procedures     I, Alaina Mccarthy, am serving as a scribe to document services personally performed by Dr. Josseline Alva based on my observation and the provider's statements to me. I, Josseline Alva MD attest that Alaina Mccarthy is acting in a scribe capacity, has observed my performance of the services and has documented them in accordance with my direction.    Josseline Alva M.D.  Emergency Medicine  [facility] EMERGENCY ROOM  [address] [place] [id number]  [phone]  Dept: [phone]       Josseline Alva MD  03/25/24 0139

## 2024-03-25 NOTE — PLAN OF CARE
Problem: Risk for Delirium  Goal: Improved Sleep  Outcome: Progressing   Goal Outcome Evaluation:    Pt AxO, no distress w/assessment, respirations even and unlabored. VSS on 4 L via nc. Pt denies pain, sob, or chest pain. Pt calls appropriately and makes needs known. IV abx administered this shift.

## 2024-03-25 NOTE — PROGRESS NOTES
03/25/24 1430   Appointment Info   Signing Clinician's Name / Credentials (OT) Nelida Kyle, OTR/L   Living Environment   People in Home significant other   Current Living Arrangements   ([facility])   Home Accessibility stairs within home   Number of Stairs, Within Home, Primary five   Living Environment Comments split entry   Self-Care   Regular Exercise Yes   Activity/Exercise Type walking   Activity/Exercise/Self-Care Comment ind for BADLs   Instrumental Activities of Daily Living (IADL)   IADL Comments S/O doing most household tasks but pt helping as able. IND for meds and driving. S/O assisting with health management currently during prep for TAVR.   General Information   Onset of Illness/Injury or Date of Surgery 03/24/24   Referring Physician Blane Reeves DO   Patient/Family Therapy Goal Statement (OT) go home   Additional Occupational Profile Info/Pertinent History of Current Problem presents with SOB and palpitations. dx of acute on chronic heart failure with preserved ejection fraction, and possible bilateral community-acquired pneumonia. preparing for TAVR - was scheduled for meeting this week to determine scheduling. Angio with [facility] 3/8/24 for TAVR prep. PMH severe aortic stenosis, hypertension, hyperlipidemia, coronary disease, peripheral arterial disease   Existing Precautions/Restrictions cardiac   Cognitive Status Examination   Affect/Mental Status (Cognitive) WFL   Follows Commands WFL   Pain Assessment   Patient Currently in Pain No   Range of Motion Comprehensive   General Range of Motion no range of motion deficits identified   Strength Comprehensive (MMT)   General Manual Muscle Testing (MMT) Assessment no strength deficits identified   Bed Mobility   Bed Mobility No deficits identified   Transfers   Transfers sit-stand transfer   Sit-Stand Transfer   Sit-Stand Trigg (Transfers) supervision   Balance   Balance Comments SBA mob in room   Activities of Daily Living   BADL  Assessment/Intervention lower body dressing   Lower Body Dressing Assessment/Training   Margie Level (Lower Body Dressing) set up   Clinical Impression   Criteria for Skilled Therapeutic Interventions Met (OT) Yes, treatment indicated   OT Diagnosis dec functional mobility   OT Problem List-Impairments impacting ADL problems related to;activity tolerance impaired   Assessment of Occupational Performance 3-5 Performance Deficits   Identified Performance Deficits community mobility, household management, health management, ADL routines   Planned Therapy Interventions (OT) home program guidelines;progressive activity/exercise;risk factor education   Clinical Decision Making Complexity (OT) problem focused assessment/low complexity   Risk & Benefits of therapy have been explained evaluation/treatment results reviewed;participants included;patient;spouse/significant other   OT Total Evaluation Time   OT Eval, Low Complexity Minutes (49997) 10   OT Goals   Therapy Frequency (OT) Daily   OT Goals Cardiac Phase 1   OT: Understanding of cardiac education to maximize quality of life, condition management, and health outcomes Patient;Verbalize   OT: Perform aerobic activity with stable cardiovascular response continuous;5 minutes   OT: Functional/aerobic ambulation tolerance with stable cardiovascular response in order to return to home and community environment Supervision/SBA;Greater than 300 feet   OT: Navigation of stairs simulating home set up with stable cardiovascular response in order to return to home and community environment Supervision/SBA;5 stairs   Self-Care/Home Management   Self-Care/Home Mgmt/ADL, Compensatory, Meal Prep Minutes (47749) 22   Treatment Detail/Skilled Intervention see cardiac educ. pt and S/O show good prior knowledge as they've been tracking weight, limiting sodium, and tracking symptoms per their TAVR prep.   Therapeutic Procedures/Exercise   Therapeutic Procedure: strength, endurance,  ROM, flexibillity minutes (23679) 10   Treatment Detail/Skilled Intervention see amb   Ambulation   Workload 215ft   Symptoms Dyspnea   Cardiovascular Response Normal   Exercise Details SBA ramos walk without AD   Vital Signs Details WNL   Cardiac Education   Education Provided Daily weights;Home exercise program;Precautions;Signs and symptoms;Stop light tool   Education Packet Given to Patient Yes   All Patient Education Handouts Reviewed with Patient and/or Family Yes   OT Discharge Planning   OT Plan 1 more session - stairs, increase amb distance   OT Discharge Recommendation (DC Rec) home with assist   OT Rationale for DC Rec S/O available to assist PRN but anticipate pt to return to PLOF   OT Brief overview of current status SBA for ramos walk   Total Session Time   Timed Code Treatment Minutes 20   Total Session Time (sum of timed and untimed services) 30

## 2024-03-26 NOTE — DISCHARGE SUMMARY
"[facility]  Hospitalist Discharge Summary      Date of Admission:  3/24/2024  Date of Discharge:  3/26/2024  Discharging Provider: Blane Reeves DO  Discharge Service: Hospitalist Service    Discharge Diagnoses   Acute on chronic heart failure with preserved ejection fraction  Severe aortic valve stenosis  Possible bilateral community-acquired pneumonia  Acute respiratory failure with hypoxia  Coronary artery disease  Normocytic anemia  Hyperlipidemia  Acute kidney injury    Clinically Significant Risk Factors     # Overweight: Estimated body mass index is 25.41 kg/m  as calculated from the following:    Height as of this encounter: 1.803 m (5' 11\").    Weight as of this encounter: 82.6 kg (182 lb 3.2 oz).       Follow-ups Needed After Discharge   Follow-up Appointments     Follow-up and recommended labs and tests       Follow up with primary care provider, Ernesto Wilburn, within 3-5 days for   hospital follow- up for community acquired pneumonia, and congestive heart   failure.  The following labs/tests are recommended: CBC, BMP, consider   repeat CXR in 4 weeks.    Follow up with Cardiology clinic, within 1-2 weeks  for hospital follow-   up, heart failure and aortic stenosis.            Unresulted Labs Ordered in the Past 30 Days of this Admission       Date and Time Order Name Status Description    3/25/2024  1:19 AM Blood Culture Line, venous Preliminary     3/25/2024  1:19 AM Blood Culture Peripheral Blood Preliminary         These results will be followed up by [facility]    Discharge Disposition   Discharged to home  Condition at discharge: Stable    Hospital Course   73-year-old male with history of severe aortic stenosis, hypertension, hyperlipidemia, coronary disease, peripheral arterial disease, who presented with complaints of shortness of breath and palpitations. Diagnosed with acute on chronic heart failure with preserved ejection fraction, and possible bilateral " community-acquired pneumonia.     On admission patient's CT chest revealed no evidence for pulmonary embolism.  There was multifocal groundglass opacities within the lingula right upper lobe and right lower lobe.  Patient also had mild bilateral pleural effusions.  Laboratory findings are most consistent with acute on chronic heart failure with preserved ejection fraction as cause for CT findings and acute respiratory failure with hypoxia.  Patient was placed on supplemental oxygen in the emergency department.  He was empirically started on Zosyn and vancomycin for possible community-acquired pneumonia.    Treated with IV furosemide with improvement of oxygen requirement.  Patient was not requiring supplemental oxygen at discharge.  Cardiology evaluated patient.  Echocardiogram again revealed severe aortic stenosis with retained LVEF.  Troponin T was mildly elevated but stable.  No further evaluation was recommended.  Patient was resumed on home furosemide at discharge and encouraged to follow 2 g sodium diet.  Patient will follow with outpatient cardiology group and plan is for eventual TAVR for severe aortic stenosis.  Patient did develop peak and creatinine of 1.3 which was likely secondary to diuresis.    Patient's procalcitonin was unremarkable at 0.09 and white count was normal at 8.5.  Patient had respiratory panel which was unremarkable.  MRSA culture was negative.  Patient will empirically continue on Augmentin and azithromycin at discharge.  Recommend close follow-up with primary care and consider repeat chest imaging to ensure resolution of infiltrates and effusion.    Consultations This Hospital Stay   PHARMACY TO DOSE VANCO  CARDIOLOGY IP CONSULT  CORE CLINIC EVALUATION IP CONSULT  OCCUPATIONAL THERAPY ADULT IP CONSULT  NUTRITION SERVICES ADULT IP CONSULT  CARE MANAGEMENT / SOCIAL WORK IP CONSULT  PHARMACY TO DOSE VANCO  CARE MANAGEMENT / SOCIAL WORK IP CONSULT    Code Status   Full Code    Time Spent  on this Encounter   I, Blane Reeves DO, personally saw the patient today and spent greater than 30 minutes discharging this patient.       Blane Reeves DO  [facility]  [address] [address]  Phone: [phone]  Fax: [phone]  ______________________________________________________________________    Physical Exam   Vital Signs: Temp: 98.1  F (36.7  C) Temp src: Oral BP: 95/52 Pulse: 71   Resp: 16 SpO2: 94 % O2 Device: None (Room air)    Weight: 182 lbs 3.2 oz  General Appearance:  Elderly gentleman laying in bed no apparent distress.  Respiratory: Diminished breath sounds at bases, crackles mid lung fields.  Cardiovascular: Regular rate and rhythm.  2/6 systolic murmur best heard over left upper sternal border.  No thrill.  No gallop.  GI: Nondistended, normoactive bowel sounds, soft to palpation.  Skin: Skin is dry no exanthem on exposed skin.  Other:  Awake alert oriented x 3.  General sensation intact all extremities.  Distal pulses normal.          Primary Care Physician   Ernesto Wilburn    Discharge Orders      Reason for your hospital stay    Heart failure with preserved ejection fraction, severe aortic stenosis, possible pneumonia.     Follow-up and recommended labs and tests     Follow up with primary care provider, Ernesto Wilburn, within 3-5 days for hospital follow- up for community acquired pneumonia, and congestive heart failure.  The following labs/tests are recommended: CBC, BMP, consider repeat CXR in 4 weeks.    Follow up with Cardiology clinic, within 1-2 weeks  for hospital follow- up, heart failure and aortic stenosis.     Activity    Your activity upon discharge: activity as tolerated     Diet    Combination Diet 2 gm NA Diet       Significant Results and Procedures   Most Recent 3 CBC's:  Recent Labs   Lab Test 03/26/24  0505 03/25/24  0602 03/24/24  2250   WBC 6.8 8.5 8.1   HGB 7.9* 7.8* 8.2*   MCV 93 93 94     207 217     Most Recent 3 BMP's:  Recent Labs   Lab Test 03/26/24  0505 03/25/24  0602 03/24/24  2250    138 137   POTASSIUM 3.8 3.9 4.2   CHLORIDE 103 104 104   CO2 23 25 22   BUN 25.5* 23.1* 23.8*   CR 1.30* 0.97 0.99   ANIONGAP 12 9 11   VONDA 8.7* 9.0 9.2   GLC 99 112* 165*     Most Recent 2 LFT's:  Recent Labs   Lab Test 03/25/24  0602 03/24/24  2250   AST 18 20   ALT 5 10   ALKPHOS 109 122   BILITOTAL 0.4 0.3     Most Recent 3 Troponin's:No lab results found.  Most Recent 3 BNP's:  Recent Labs   Lab Test 03/24/24  2250   NTBNPI 3,746*     7-Day Micro Results       Collected Updated Procedure Result Status      03/25/2024 0306 03/25/2024 1007 MRSA MSSA PCR, Nasal Swab [[id number]]    Swab from Nares, Bilateral    Final result Component Value   MRSA Target DNA Negative   SA Target DNA Positive            03/25/2024 0306 03/25/2024 0957 Legionella pneumophila antigen urine [[id number]]   Urine, Midstream    Final result Component Value   Legionella pneumophila serogroup 1 urinary antigen Negative   Suggests no recent or current infection. Infection due to Legionella cannot be ruled out, since other serogroups and species may cause disease, antigen may not be present in urine in early infection, and the level of antigen present in the urine may be below detectable limits of the test.            03/25/2024 0306 03/25/2024 0958 Streptococcus pneumoniae antigen [[id number]]   Urine, Midstream    Final result Component Value   Streptococcus pneumoniae antigen Negative   A negative Streptococcus pneumoniae antigen result does not rule out infection with Streptococcus pneumoniae.            03/25/2024 0134 03/26/2024 0901 Blood Culture Peripheral Blood [[id number]]   Peripheral Blood    Preliminary result Component Value   Culture No growth after 1 day  [P]                03/25/2024 0127 03/26/2024 0901 Blood Culture Line, venous [[id number]]   Blood from Line, venous    Preliminary result Component Value    Culture No growth after 1 day  [P]                03/24/2024 2254 03/25/2024 0004 Symptomatic Influenza A/B, RSV, & SARS-CoV2 PCR (COVID-19) Nasopharyngeal [[id number]]    Swab from Nasopharyngeal    Final result Component Value   Influenza A PCR Negative   Influenza B PCR Negative   RSV PCR Negative   SARS CoV2 PCR Negative   NEGATIVE: SARS-CoV-2 (COVID-19) RNA not detected, presumed negative.            03/24/2024 2254 03/25/2024 1040 Respiratory Panel PCR [[id number]]    Swab from Nasopharyngeal    Final result Component Value   Adenovirus Not Detected   Coronavirus Not Detected   This test detects Coronavirus 229E, HKU1, NL63 and OC43 but does not distinguish between them. It does not detect MERS ( Respiratory Syndrome), SARS (Severe Acute Respiratory Syndrome) or 2019-nCoV (Novel 2019) Coronavirus.   Human Metapneumovirus Not Detected   Human Rhin/Enterovirus Not Detected   Influenza A Not Detected   Influenza A, H1 Not Detected   Influenza A 2009 H1N1 Not Detected   Influenza A, H3 Not Detected   Influenza B Not Detected   Parainfluenza Virus 1 Not Detected   Parainfluenza Virus 2 Not Detected   Parainfluenza Virus 3 Not Detected   Parainfluenza Virus 4 Not Detected   Respiratory Syncytial Virus A Not Detected   Respiratory Syncytial Virus B Not Detected   Chlamydia Pneumoniae Not Detected   Mycoplasma Pneumoniae Not Detected                  Most Recent 6 glucoses:  Recent Labs   Lab Test 03/26/24  0505 03/25/24  0602 03/24/24  2250   GLC 99 112* 165*     Most Recent Urinalysis:  Recent Labs   Lab Test 03/25/24  0306   COLOR Colorless   APPEARANCE Clear   URINEGLC Negative   URINEBILI Negative   URINEKETONE Negative   SG 1.020   UBLD Negative   URINEPH 5.0   PROTEIN Negative   NITRITE Negative   LEUKEST Negative   RBCU <1   WBCU <1     Most Recent ESR & CRP:No lab results found.,   Results for orders placed or performed during the hospital encounter of 03/24/24   XR Chest Port 1 View     Narrative    EXAM: XR CHEST PORT 1 VIEW  LOCATION: [facility]  DATE: 3/24/2024    INDICATION: Shortness of breath.  COMPARISON: Chest x-ray on 01/30/2024.      Impression    IMPRESSION: Single AP view of the chest was obtained. Postprocedural changes of thoracic aortic endograft. Cardiomediastinal silhouette is within normal limits. Mild left basilar pulmonary opacities, could represent atelectasis versus infection. No   significant pleural effusion or pneumothorax. Multiple surgical clips in the left lower neck area.   CT Chest Pulmonary Embolism w Contrast    Narrative    EXAM: CT CHEST PULMONARY EMBOLISM W CONTRAST  LOCATION: [facility]  DATE: 3/25/2024    INDICATION: shortness of breath, hypoxia  COMPARISON: 07/07/2023 and 01/24/2024  TECHNIQUE: CT chest pulmonary angiogram during arterial phase injection of IV contrast. Multiplanar reformats and MIP reconstructions were performed. Dose reduction techniques were used.   CONTRAST: 100 mL of Omnipaque 350 contrast.    FINDINGS:  ANGIOGRAM CHEST: Pulmonary arteries are normal caliber and negative for pulmonary emboli. A an aortic endograft is again seen originating from the aortic arch into the proximal descending thoracic aorta with embolization coil seen in the origin of the   left subclavian artery, similar to prior exam. 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 There is no evidence of aortic dissection. No CT evidence of right heart strain.    LUNGS AND PLEURA: Small bilateral pleural effusions are present. Patchy groundglass opacities are seen in the lungs bilaterally, most pronounced in the right upper lobe and lingula as well as the right lower lobe superior segment lesser degree..   Concerning for developing multifocal infectious and/or inflammatory process. Additionally there is septal emphysema seen in the lung apices bilaterally.    MEDIASTINUM/AXILLAE: Aortic valvular and mitral  annular calcifications are again noted, unchanged. The heart is normal in size. No pericardial effusion. Poststernotomy mediastinal lymph nodes are again noted, slightly increased in size and number from   prior exam favored to be reactive in nature. 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  Adenopathy is also present.     CORONARY ARTERY CALCIFICATION: Moderate.    UPPER ABDOMEN: Cholelithiasis    MUSCULOSKELETAL: No concerning bone lesions.      Impression    IMPRESSION:  1.  No evidence of Pulmonary embolus to the segmental level.  2.  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  3.  Multifocal groundglass opacities within the lingula, right upper lobe, and superior segment of the right lower lobe to lesser degree concerning for developing multifocal infectious and/or inflammatory process.   4.  Shotty mediastinal and axillary adenopathy, favored reactive in nature, although the degree of lymphadenopathy fetal somewhat out of proportion with the pulmonary findings correlation for symptoms of a systemic infection is recommended.  5.  Mild bilateral pleural effusions with apical septal thickening, concerning for volume overload/interstitial edema     Echocardiogram Complete     Value    LVEF  55-60%    Narrative    [id number]  [id number]  [id number]  [id number]^[place] [address]     Name: VETO CARDENAS  MRN: [id number]  : 1950  Study Date: 2024 11:26 AM  Age: 73 yrs  Gender: Male  Patient Location: [facility]  Reason For Study: Heart Failure  Ordering Physician: TERI GIL  Performed By: DIAMOND     BSA: 2.1 m2  Height: 71 in  Weight: 189 lb  HR: 70  BP: 137/63  mmHg  ______________________________________________________________________________  Procedure  Complete Portable Echo Adult. Hemodynamically significant valve disease is  identified. Recommend referral to valve clinic for further evaluation. Valve  clinic phone number: [phone]. [facility] Epic: [facility]  [place] [facility].  ______________________________________________________________________________  Interpretation Summary     Left ventricular size, wall motion and function are normal. The ejection  fraction is 55-60%.  Normal right ventricle size and systolic function.  Severe valvular aortic stenosis.  There is mild (1+) aortic regurgitation.  There is mild (1+) mitral regurgitation.  ______________________________________________________________________________  Left Ventricle  Left ventricular size, wall motion and function are normal. The ejection  fraction is 55-60%. There is normal left ventricular wall thickness. Left  ventricular diastolic function is abnormal. Diastolic Doppler findings (E/E'  ratio and/or other parameters) suggest left ventricular filling pressures are  increased. No regional wall motion abnormalities noted.     Right Ventricle  Normal right ventricle size and systolic function. TAPSE is normal, which is  consistent with normal right ventricular systolic function.     Atria  The left atrium is severely dilated. The right atrium is mildly dilated. There  is no color Doppler evidence of an atrial shunt.     Mitral Valve  Mitral valve leaflets appear normal. There is no evidence of mitral stenosis  or clinically significant mitral regurgitation. There is moderate mitral  annular calcification. There is mild (1+) mitral regurgitation.     Tricuspid Valve  Tricuspid valve leaflets appear normal. Right ventricle systolic pressure  estimate normal. There is mild (1+) tricuspid regurgitation.     Aortic Valve  Moderate aortic valve calcification is present. There is mild (1+)  aortic  regurgitation. Severe valvular aortic stenosis. The mean AoV pressure gradient  is 50.7 mmHg.     Pulmonic Valve  The pulmonic valve is not well seen, but is grossly normal.     Vessels  The aorta root is normal. Normal size ascending aorta. IVC diameter <2.1 cm  collapsing >50% with sniff suggests a normal RA pressure of 3 mmHg.     Pericardium  There is no pericardial effusion.     ______________________________________________________________________________  MMode/2D Measurements & Calculations  IVSd: 1.6 cm  LVIDd: 5.6 cm  LVIDs: 4.1 cm  LVPWd: 1.5 cm  FS: 26.2 %     LV mass(C)d: 402.7 grams  LV mass(C)dI: 195.6 grams/m2  Ao root diam: 3.1 cm  LVOT diam: 2.0 cm  LVOT area: 3.3 cm2  Ao root diam index Ht(cm/m): 1.7  Ao root diam index BSA (cm/m2): 1.5  EF Biplane: 78.4 %  LA Volume (BP): 97.4 ml  LA Volume Index (BP): 47.3 ml/m2     LA Volume Indexed (AL/bp): 53.2 ml/m2  RV Base: 4.5 cm  RWT: 0.54  TAPSE: 2.4 cm     Doppler Measurements & Calculations  MV E max omar: 143.0 cm/sec  MV A max omar: 69.8 cm/sec  MV E/A: 2.0  MV max P.9 mmHg  MV mean PG: 3.5 mmHg  MV V2 VTI: 38.6 cm  MVA(VTI): 2.2 cm2  MV dec time: 0.20 sec  Ao V2 max: 444.3 cm/sec  Ao max P.0 mmHg  Ao V2 mean: 338.6 cm/sec  Ao mean P.7 mmHg  Ao V2 VTI: 115.1 cm  RENEE(I,D): 0.73 cm2  RENEE(V,D): 0.78 cm2  AI P1/2t: 415.8 msec     LV V1 max P.5 mmHg  LV V1 max: 105.9 cm/sec  LV V1 VTI: 25.6 cm  SV(LVOT): 83.6 ml  SI(LVOT): 40.6 ml/m2  PA acc time: 0.11 sec  TR max omar: 262.0 cm/sec  TR max P.5 mmHg  AV Omar Ratio (DI): 0.24  RENEE Index (cm2/m2): 0.35  E/E': 13.1  E/E' av.6  Lateral E/e': 13.2  Medial E/e': 32.1  Peak E' Omar: 10.9 cm/sec  RV S Omar: 18.0 cm/sec     ______________________________________________________________________________  Report approved by: Willa Owens 2024 01:21 PM             Discharge Medications   Current Discharge Medication List        START taking these medications    Details    amoxicillin-clavulanate (AUGMENTIN) 875-125 MG tablet Take 1 tablet by mouth 2 times daily for 5 days  Qty: 10 tablet, Refills: 0    Associated Diagnoses: Community acquired pneumonia, unspecified laterality      azithromycin (ZITHROMAX) 250 MG tablet Take 2 tablets (500 mg) by mouth daily for 1 day, THEN 1 tablet (250 mg) daily for 4 days.  Qty: 6 tablet, Refills: 0    Associated Diagnoses: Community acquired pneumonia, unspecified laterality           CONTINUE these medications which have CHANGED    Details   amLODIPine (NORVASC) 5 MG tablet Take 1 tablet (5 mg) by mouth daily    Associated Diagnoses: Essential hypertension           CONTINUE these medications which have NOT CHANGED    Details   aspirin 81 MG EC tablet Take 81 mg by mouth daily      atorvastatin (LIPITOR) 80 MG tablet Take 80 mg by mouth daily      carvedilol (COREG) 12.5 MG tablet Take 12.5 mg by mouth 2 times daily      clopidogrel (PLAVIX) 75 MG tablet Take 75 mg by mouth daily      desonide (DESOWEN) 0.05 % external ointment APPLY TOPICALLY TO THE AFFECTED AREA ON FACE AND SCALP TWICE DAILY AS NEEDED  Qty: 60 g, Refills: 3    Associated Diagnoses: Intrinsic atopic dermatitis      ferrous sulfate (FEROSUL) 325 (65 Fe) MG tablet Take 325 mg by mouth daily (with breakfast)      fexofenadine (ALLEGRA) 180 MG tablet Take 1 tablet (180 mg) by mouth 2 times daily  Qty: 180 tablet, Refills: 3    Associated Diagnoses: Intrinsic atopic dermatitis      folic acid (FOLVITE) 1 MG tablet Take 1 mg by mouth daily      !! furosemide (LASIX) 20 MG tablet Take 20 mg by mouth daily      !! furosemide (LASIX) 20 MG tablet Take 20 mg by mouth as needed (take extra 20mg as needed for weight gain)      hydrOXYzine HCl (ATARAX) 25 MG tablet Take 25 mg by mouth at bedtime      tacrolimus (PROTOPIC) 0.1 % external ointment Apply twice daily to areas of eczema  Qty: 100 g, Refills: 11    Associated Diagnoses: Intrinsic atopic dermatitis      tamsulosin (FLOMAX) 0.4 MG  capsule Take 0.4 mg by mouth daily (with dinner)      Tralokinumab-ldrm 150 MG/ML SOSY Inject 150 mg/mL Subcutaneous every 14 days      urea (GORMEL) 20 % external cream Apply twice daily to areas of flaking/scaling. 480g is one month supply  Qty: 480 g, Refills: 11    Associated Diagnoses: Intrinsic atopic dermatitis       !! - Potential duplicate medications found. Please discuss with provider.        Allergies   No Known Allergies

## 2024-03-26 NOTE — PROGRESS NOTES
NUTRITION EDUCATION      REASON FOR ASSESSMENT:  2g Na diet for CHF     NUTRITION HISTORY:  Information obtained from pt and pts wife.     Pt worked with a dietitian in the fall on following a low sodium diet. He has been implementing the changes at home. He does not have any further questions regarding his diet.         CURRENT DIET:  2g Na, no caffeine, 1800ml FR     INTERVENTIONS:  No further interventions. Pt working on his diet and well educated       Follow Up/Monitoring:      *  Provided RD contact information for future questions      *  Recommended Out-Patient Nutrition Referral, if further diet instructions are needed

## 2024-03-26 NOTE — PLAN OF CARE
Pt is A&Ox4. Pt denied pain during shift thus far. Skin is flushed, dry, and flaky. Pt states this is his baseline due to eczema. Offered to page provider for prn medications but pt declined.   Full sensation per pt. SBA for transferring. Saline locked between IV abx and electrolyte replacement. Voiding adequately with urinal at bedside. Education on medication administration, alarms, and use of call-light to reduce risk for falls and injury. VSS, denies chest pain, shortness of breath, and nausea. Tele NSR.  and K replaced this AM.

## 2024-03-26 NOTE — PROGRESS NOTES
Cardiology Progress Note    Assessment/Plan:  1.  Mild acute on chronic HFpEF, improving with IV diuresis.  He is now mildly hypotensive so we will hold on further IV diuretic therapy.  Will likely resume oral furosemide tomorrow.  2.  Severe aortic valve stenosis, awaiting TAVR at [facility]  3.  CAD, status post MAMADOU to the proximal-mid LAD 3/8/2024, currently on dual antiplatelet therapy.  States he has not missed any doses.  4.  Possible bilateral pneumonia, suggested by chest CT.  Patient initiated on broad-spectrum antibiotic therapy.  5.  Anemia, etiology unclear    Primary cardiologist: [facility] cardiology    Subjective:  Patient without any complaints.  States his breathing feels better.  Wants to go home.     amLODIPine  5 mg Oral Daily    aspirin  81 mg Oral Daily    atorvastatin  80 mg Oral QPM    carvedilol  12.5 mg Oral BID    clopidogrel  75 mg Oral Daily    enoxaparin ANTICOAGULANT  40 mg Subcutaneous Q24H    ferrous sulfate  325 mg Oral Daily with breakfast    fexofenadine  180 mg Oral BID    folic acid  1 mg Oral Daily    hydrOXYzine HCl  25 mg Oral At Bedtime    piperacillin-tazobactam  3.375 g Intravenous Q8H    sodium chloride (PF)  3 mL Intracatheter Q8H    sodium chloride (PF)  3 mL Intracatheter Q8H    tamsulosin  0.4 mg Oral Daily with supper         Objective:   Vital signs in last 24 hours:  Temp:  [98.1  F (36.7  C)-98.7  F (37.1  C)] 98.1  F (36.7  C)  Pulse:  [71-93] 71  Resp:  [16-29] 16  BP: ()/(52-65) 95/52  SpO2:  [93 %-94 %] 94 %  Weight:        Review of Systems:  Negative    Physical Exam:  General appearance: alert, cooperative, no distress   Head: Normocephalic, without obvious abnormality, atraumatic  Neck: no JVD   Lungs: clear to auscultation bilaterally with few crackles at the bases  Heart: Regular rhythm.  S1 normal, S2 reduced.  2/6 late peaking crescendo decrescendo systolic murmur heard at the left sternal border.  Extremities: No peripheral edema  "bilaterally    Cardiographics (personally reviewed):   Telemetry demonstrates sinus rhythm    Imaging (personally reviewed):   Procedure  Complete Portable Echo Adult. ______________________________________________________________________________  Interpretation Summary     Left ventricular size, wall motion and function are normal. The ejection  fraction is 55-60%.  Normal right ventricle size and systolic function.  Severe valvular aortic stenosis.  There is mild (1+) aortic regurgitation.  There is mild (1+) mitral regurgitation.    Lab Results (personally reviewed):     No results for input(s): \"CHOL\", \"HDL\", \"LDL\", \"TRIG\", \"CHOLHDLRATIO\" in the last 79615 hours.  No results for input(s): \"LDL\" in the last 74391 hours.  Recent Labs   Lab Test 03/26/24  0505      POTASSIUM 3.8   CHLORIDE 103   CO2 23   GLC 99   BUN 25.5*   CR 1.30*   GFRESTIMATED 59*   VONDA 8.7*     Recent Labs   Lab Test 03/26/24  0505 03/25/24  0602 03/24/24  2250   CR 1.30* 0.97 0.99     No results for input(s): \"A1C\" in the last 04765 hours.       Recent Labs   Lab Test 03/26/24  0505   WBC 6.8   HGB 7.9*   HCT 25.1*   MCV 93        Recent Labs   Lab Test 03/26/24  0505 03/25/24  0602 03/24/24  2250   HGB 7.9* 7.8* 8.2*    No results for input(s): \"TROPONINI\" in the last 97560 hours.  Recent Labs   Lab Test 03/24/24  2250   NTBNPI 3,746*     Recent Labs   Lab Test 03/24/24  2250   TSH 1.77     No results for input(s): \"INR\" in the last 62266 hours.       Tatyana Heck MD          "

## 2024-03-26 NOTE — PROGRESS NOTES
Care Management Discharge Note    Discharge Date: 03/26/2024       Discharge Disposition: Home    Discharge Services: None    Discharge DME: None    Discharge Transportation: family or friend will provide    Private pay costs discussed: Not applicable    Does the patient's insurance plan have a 3 day qualifying hospital stay waiver?  No    PAS Confirmation Code:    Patient/family educated on Medicare website which has current facility and service quality ratings: no    Education Provided on the Discharge Plan: Yes (Assessment and service coordination process explained to pt)  Persons Notified of Discharge Plans: pt  Patient/Family in Agreement with the Plan: yes    Handoff Referral Completed: Yes    Additional Information:  Pt to discharge back to home with SO. SO to transport.     Pt to follow-up in clinic with PCP and Cardiology.    Saúl Quezada RN

## 2024-03-26 NOTE — PROGRESS NOTES
Occupational Therapy Discharge Summary    Reason for therapy discharge:    Discharged to home.    Progress towards therapy goal(s). See goals on Care Plan in [facility] electronic health record for goal details.  Goals met    Therapy recommendation(s):    No further therapy is recommended.

## 2024-03-26 NOTE — PLAN OF CARE
Problem: Pneumonia  Goal: Fluid Balance  Outcome: Progressing     Problem: Heart Failure  Goal: Optimal Coping  Outcome: Progressing    Pt admitted from  ED for CHF exacerbation & CAP. He is alert & oriented. Up to bathroom w/ SBA. Tele reading NSR. Scheduled carvedilol held per order parameter. Skin is extremely flaky w/ scabbing from eczema. Fluid restriction maintained. Will discharge back home in upcoming days.

## 2024-03-28 NOTE — PROGRESS NOTES
Connected Care Resource Center:   [facility] Contact  [facility]/Voicemail     Clinical Data: Post-Discharge Outreach     Outreach attempted x 3.  Left message on patient's voicemail, providing [facility]'s central phone number of [phone] ([phone]) for questions/concerns and/or to schedule an appt with an [facility] provider, if they do not have a PCP.      Plan:  [facility] will do no further outreaches at this time.       Jaquelin Webb RN  [facility] Care Resource [place], [facility]    *Connected Care Resource Team does NOT follow patient ongoing. Referrals are identified based on internal discharge reports and the outreach is to ensure patient has an understanding of their discharge instructions.

## 2024-05-23 NOTE — TELEPHONE ENCOUNTER
Prior Authorization Retail Medication Request    Medication/Dose: fexofenadine (ALLEGRA) 180 MG tablet  Diagnosis and ICD code (if different than what is on RX):    New/renewal/insurance change PA/secondary ins. PA:  Previously Tried and Failed:  see chart  Rationale:  see chart    Insurance   Primary: MEDICARE  Insurance ID:  [id number]     Secondary (if applicable):[employer]   Insurance ID:  [id number]

## 2024-06-04 NOTE — TELEPHONE ENCOUNTER
Central Prior Authorization Team  Phone: [phone]    PA Initiation    Medication: FEXOFENADINE  MG PO TABS  Insurance Company: [employer] ([facility]) - Phone [phone] Fax [phone]  Pharmacy Filling the Rx: [id number] [place], [place] - [facility] [facility]  AT [address]  Filling Pharmacy Phone: [phone]  Filling Pharmacy Fax:    Start Date: 6/4/2024

## 2024-06-07 NOTE — TELEPHONE ENCOUNTER
Central Prior Authorization Team  Phone: [phone]    PRIOR AUTHORIZATION DENIED    Medication: FEXOFENADINE  MG PO TABS  Insurance Company: [employer] ([facility]) - Phone [phone] Fax [phone]  Denial Date:    Denial Reason(s):     OTC drugs are excluded from coverage under medicare rules    Appeal Information: n/a  Patient Notified: no

## 2024-06-07 NOTE — NURSING NOTE
Dermatology Rooming Note    Junaid Cormier's goals for this visit include:   Chief Complaint   Patient presents with    Derm Problem     Eczematous dermatitis - Sean states he is worse. There is an increase in redness, swelling and itching. His wife is concerned her is having a reaction again. They started benydral last night.      Devon ACHARYA CMA

## 2024-06-07 NOTE — LETTER
6/7/2024       RE: Junaid Cormier  [address] [place] [address]     Dear Colleague,    Thank you for referring your patient, Junaid Cormier, to the [facility] [place] at [facility]. Please see a copy of my visit note below.    [facility] Dermatology Note    Encounter Date: Jun 7, 2024    Dermatology Problem List:  1.  Erythroderma  - DDx: CTCL, eczematous dermatitis, may have component of ACD (Patch testing February 2023 positive fragrance and Isoeugenol)  - outside biopsy x2: subacute spongiotic dermatitis, repeat biopsies September 2023 with spongiotic dermatitis  - Current workup: Repeated biopsies 6/7/2024; laboratory evaluation including CBC with differential, flow cytometry, peripheral smear, T-cell receptor rearrangement, CMP obtained 6/7/2024  - Prior workup: Pemphigus and pemphigoid antibody panel 2/16/2024 negative  - current therapy: Prednisone 60 mg daily, urea 20% cream BID, augmented betamethasone ointment/lotion, gabapentin 100 mg nightly for sleep  - previous therapy: mycophenolate 1000 mg BID, cetirizine 10/20, dupilumab, tralokinumab,  tacrolimus ointment BID, desoximetasone/desonide/fluocinonide, nbUVB phototherapy  - in reserve: methotrexate, CLARKE inhibitor  2. Actinic purpura: due in part to topical steroid use    ______________________________________    Impression/Plan:  1.  Erythroderma  Chronic, severe, flaring and erythrodermic today with estimated 90% body surface area involvement.  Patient reports no improvement after changing from the tralokinumab back to dupilumab in February 2024 and on topicals.    Given the presence of erythroderma and lack of improvement on tralokinumab and dupilumab, suspicion for a cutaneous T-cell lymphoma such as MF or Sezary syndrome is high at this juncture.  Although prior biopsies in Sept 2023 are more consistent with an eczematous dermatitis, and  outside biopsies are consistent with these findings as well, we discussed that it often takes many different biopsies to ultimately diagnose CTCL.  Thus we recommended proceeding with 2 additional biopsies today as well as blood work.  We also discussed starting prednisone as well as its associated adverse effects of insomnia, high blood sugar, and longer-term adverse effects of osteopenia and HPA axis suppression.  Will hold off on more definitive long-term treatment until the diagnosis is further elucidated.  - start prednisone 60 mg daily until lab work and biopsy results have returned  - Punch biopsy of left lateral upper arm, broad shave of right mid paraspinal back (see procedure note below)  - continue topicals: betamethasone diprionate 0.05% ointment to body  - Start gabapentin 100 mg nightly for sleep; continue Allegra 180 mg twice daily  - Hold on Protopic  - obtain labs today: CBC with differential, CMP, LDH, flow cytometry of blood, T-cell rearrangement of blood, peripheral smear    Procedures Performed:   - Shave biopsy procedure note, location(s): Right mid paraspinal back. After discussion of benefits and risks including but not limited to bleeding, infection, scar, incomplete removal, recurrence, and non-diagnostic biopsy, verbal consent and photographs were obtained. The area was cleaned with isopropyl alcohol. 0.5mL of 1% lidocaine with epinephrine was injected to obtain adequate anesthesia of lesion(s). Shave biopsy at site(s) performed. Hemostasis was achieved with aluminium chloride. Petrolatum ointment and a sterile dressing were applied. The patient tolerated the procedure   and no complications were noted. The patient was provided with verbal and written post care instructions.     - Punch biopsy procedure note, location(s): Left lateral upper arm. After discussion of benefits and risks including but not limited to bleeding, infection, scar, incomplete removal, recurrence, and non-diagnostic  biopsy, written consent and photographs were obtained. The area was cleaned with isopropyl alcohol. 0.5mL of 1% lidocaine with epinephrine was injected to obtain adequate anesthesia and a 4 mm punch biopsy was performed at site(s). 4-0 Prolene sutures were utilized to approximate the epidermal edges. White petrolatum ointment and a bandage was applied to the wound. Explicit verbal and written wound care instructions were provided. The patient left the dermatology clinic in good condition.     Follow-up in 1 month, in person.       Staff Involved:  Staff, resident and Scribe    I, Hanna Gonzales, am serving as a scribe; to document services personally performed by Gilberto Virgen MD -based on data collection and the provider's statements to me.    Bon Desai MD PGY-3  Dermatology Resident     Staff Physician Comments:   I saw and evaluated the patient with the resident and I edited the assessment and plan as documented in the note. I was present for the key portions of the above major procedure and examination.    Provider Disclosure:   The documentation recorded by the scribe accurately reflects the services I personally performed and the decisions made by me.    Gilberto Virgen MD   of Dermatology  Department of Dermatology  [facility]        CC:   Chief Complaint   Patient presents with    Derm Problem     Eczematous dermatitis - Sean states he is worse. There is an increase in redness, swelling and itching. His wife is concerned her is having a reaction again. They started benydral last night.        History of Present Illness:  Mr. Junaid Cormier is a 73 year old male who presents as a return patient.    Patient presents with his wife today.  Together, they note that the patient's eczema has been flaring badly for the past 6 months.  He was on Adbry for approximately 1 year, and recently within the past 6-month switch back to Dupixent.  The  other medication has helped much with his eczema, although Dupixent did help significantly after it was initially started a few years ago.  Notably, his eczema did not worsen on either of these injectable medications.    Patient's itch is severe and his rash is affecting nearly his entire body surface area.  He rates the itch at 10/10 and it is keeping him up at night.    The patient has no history malignancy.  He has a remote history of tobacco use but is a current non-smoker.    Labs:    Dermatology 9/1/2023:    A(1). Skin, Posterior neck, punch:  - Epidermal hyperplasia with spongiosis and moderately dense lymphocytic infiltrate - (see comment)     B(2). Skin, Right dorsal hand, punch:  - Psoriasiform and spongiotic dermatitis with moderately dense lymphocytic infiltrate - (see comment)     C(3). Skin, Left helix - superior, shave:  - Cirsoid aneurysm (A-V hemangioma), ulcerated and inflamed - (see description)      D(4). Skin, Left helix - inferior, shave:  - Cirsoid aneurysm (A-V hemangioma) - (see description)    Physical exam:  Vitals: There were no vitals taken for this visit.  GEN: This is a well developed, well-nourished male in no acute distress, in a pleasant mood.    SKIN: Foreman phototype II  - Focused examination of the scalp, face, trunk, upper and lower extremities was performed.  - On the scalp, face, chest, back, arms and legs there are nearly confluent deeply red/pink scaly plaques affecting roughly 90% body surface area  - Yellowed, thickened nails with several transverse lines  - No other lesions of concern on areas examined.                                   Past Medical History:   No past medical history on file.  No past surgical history on file.    Social History:   reports that he has quit smoking. He has quit using smokeless tobacco.    Family History:  No family history on file.    Medications:  Current Outpatient Medications   Medication Sig Dispense Refill    amLODIPine (NORVASC)  5 MG tablet Take 1 tablet (5 mg) by mouth daily      aspirin 81 MG EC tablet Take 81 mg by mouth daily      atorvastatin (LIPITOR) 80 MG tablet Take 80 mg by mouth daily      carvedilol (COREG) 12.5 MG tablet Take 12.5 mg by mouth 2 times daily      clopidogrel (PLAVIX) 75 MG tablet Take 75 mg by mouth daily      desonide (DESOWEN) 0.05 % external ointment APPLY TOPICALLY TO THE AFFECTED AREA ON FACE AND SCALP TWICE DAILY AS NEEDED 60 g 3    ferrous sulfate (FEROSUL) 325 (65 Fe) MG tablet Take 325 mg by mouth daily (with breakfast)      fexofenadine (ALLEGRA) 180 MG tablet Take 1 tablet (180 mg) by mouth 2 times daily 180 tablet 3    folic acid (FOLVITE) 1 MG tablet Take 1 mg by mouth daily      furosemide (LASIX) 20 MG tablet Take 20 mg by mouth daily      furosemide (LASIX) 20 MG tablet Take 20 mg by mouth as needed (take extra 20mg as needed for weight gain)      hydrOXYzine HCl (ATARAX) 25 MG tablet Take 25 mg by mouth at bedtime      tacrolimus (PROTOPIC) 0.1 % external ointment Apply twice daily to areas of eczema 100 g 11    tamsulosin (FLOMAX) 0.4 MG capsule Take 0.4 mg by mouth daily (with dinner)      Tralokinumab-ldrm 150 MG/ML SOSY Inject 150 mg/mL Subcutaneous every 14 days      urea (GORMEL) 20 % external cream Apply twice daily to areas of flaking/scaling. 480g is one month supply 480 g 11     No Known Allergies

## 2024-06-07 NOTE — PROGRESS NOTES
[facility] Dermatology Note    Encounter Date: Jun 7, 2024    Dermatology Problem List:  1.  Erythroderma  - DDx: CTCL, eczematous dermatitis, may have component of ACD (Patch testing February 2023 positive fragrance and Isoeugenol)  - outside biopsy x2: subacute spongiotic dermatitis, repeat biopsies September 2023 with spongiotic dermatitis  - Current workup: Repeated biopsies 6/7/2024; laboratory evaluation including CBC with differential, flow cytometry, peripheral smear, T-cell receptor rearrangement, CMP obtained 6/7/2024  - Prior workup: Pemphigus and pemphigoid antibody panel 2/16/2024 negative  - current therapy: Prednisone 60 mg daily, urea 20% cream BID, augmented betamethasone ointment/lotion, gabapentin 100 mg nightly for sleep  - previous therapy: mycophenolate 1000 mg BID, cetirizine 10/20, dupilumab, tralokinumab,  tacrolimus ointment BID, desoximetasone/desonide/fluocinonide, nbUVB phototherapy  - in reserve: methotrexate, CLARKE inhibitor  2. Actinic purpura: due in part to topical steroid use    ______________________________________    Impression/Plan:  1.  Erythroderma  Chronic, severe, flaring and erythrodermic today with estimated 90% body surface area involvement.  Patient reports no improvement after changing from the tralokinumab back to dupilumab in February 2024 and on topicals.    Given the presence of erythroderma and lack of improvement on tralokinumab and dupilumab, suspicion for a cutaneous T-cell lymphoma such as MF or Sezary syndrome is high at this juncture.  Although prior biopsies in Sept 2023 are more consistent with an eczematous dermatitis, and outside biopsies are consistent with these findings as well, we discussed that it often takes many different biopsies to ultimately diagnose CTCL.  Thus we recommended proceeding with 2 additional biopsies today as well as blood work.  We also discussed starting prednisone as well as its associated adverse effects of  insomnia, high blood sugar, and longer-term adverse effects of osteopenia and HPA axis suppression.  Will hold off on more definitive long-term treatment until the diagnosis is further elucidated.  - start prednisone 60 mg daily until lab work and biopsy results have returned  - Punch biopsy of left lateral upper arm, broad shave of right mid paraspinal back (see procedure note below)  - continue topicals: betamethasone diprionate 0.05% ointment to body  - Start gabapentin 100 mg nightly for sleep; continue Allegra 180 mg twice daily  - Hold on Protopic  - obtain labs today: CBC with differential, CMP, LDH, flow cytometry of blood, T-cell rearrangement of blood, peripheral smear    Procedures Performed:   - Shave biopsy procedure note, location(s): Right mid paraspinal back. After discussion of benefits and risks including but not limited to bleeding, infection, scar, incomplete removal, recurrence, and non-diagnostic biopsy, verbal consent and photographs were obtained. The area was cleaned with isopropyl alcohol. 0.5mL of 1% lidocaine with epinephrine was injected to obtain adequate anesthesia of lesion(s). Shave biopsy at site(s) performed. Hemostasis was achieved with aluminium chloride. Petrolatum ointment and a sterile dressing were applied. The patient tolerated the procedure   and no complications were noted. The patient was provided with verbal and written post care instructions.     - Punch biopsy procedure note, location(s): Left lateral upper arm. After discussion of benefits and risks including but not limited to bleeding, infection, scar, incomplete removal, recurrence, and non-diagnostic biopsy, written consent and photographs were obtained. The area was cleaned with isopropyl alcohol. 0.5mL of 1% lidocaine with epinephrine was injected to obtain adequate anesthesia and a 4 mm punch biopsy was performed at site(s). 4-0 Prolene sutures were utilized to approximate the epidermal edges. White petrolatum  ointment and a bandage was applied to the wound. Explicit verbal and written wound care instructions were provided. The patient left the dermatology clinic in good condition.     Follow-up in 1 month, in person.       Staff Involved:  Staff, resident and Scribe    I, Hanna Gonzales, am serving as a scribe; to document services personally performed by Gilberto Virgen MD -based on data collection and the provider's statements to me.    Bon Desai MD PGY-3  Dermatology Resident     Staff Physician Comments:   I saw and evaluated the patient with the resident and I edited the assessment and plan as documented in the note. I was present for the key portions of the above major procedure and examination.    Provider Disclosure:   The documentation recorded by the scribe accurately reflects the services I personally performed and the decisions made by me.    Gilberto Virgen MD   of Dermatology  Department of Dermatology  [facility]        CC:   Chief Complaint   Patient presents with    Derm Problem     Eczematous dermatitis - Sean states he is worse. There is an increase in redness, swelling and itching. His wife is concerned her is having a reaction again. They started benydral last night.        History of Present Illness:  Mr. Junaid Cormier is a 73 year old male who presents as a return patient.    Patient presents with his wife today.  Together, they note that the patient's eczema has been flaring badly for the past 6 months.  He was on Adbry for approximately 1 year, and recently within the past 6-month switch back to Dupixent.  The other medication has helped much with his eczema, although Dupixent did help significantly after it was initially started a few years ago.  Notably, his eczema did not worsen on either of these injectable medications.    Patient's itch is severe and his rash is affecting nearly his entire body surface area.  He rates  the itch at 10/10 and it is keeping him up at night.    The patient has no history malignancy.  He has a remote history of tobacco use but is a current non-smoker.    Labs:    Dermatology 9/1/2023:    A(1). Skin, Posterior neck, punch:  - Epidermal hyperplasia with spongiosis and moderately dense lymphocytic infiltrate - (see comment)     B(2). Skin, Right dorsal hand, punch:  - Psoriasiform and spongiotic dermatitis with moderately dense lymphocytic infiltrate - (see comment)     C(3). Skin, Left helix - superior, shave:  - Cirsoid aneurysm (A-V hemangioma), ulcerated and inflamed - (see description)      D(4). Skin, Left helix - inferior, shave:  - Cirsoid aneurysm (A-V hemangioma) - (see description)    Physical exam:  Vitals: There were no vitals taken for this visit.  GEN: This is a well developed, well-nourished male in no acute distress, in a pleasant mood.    SKIN: Foreman phototype II  - Focused examination of the scalp, face, trunk, upper and lower extremities was performed.  - On the scalp, face, chest, back, arms and legs there are nearly confluent deeply red/pink scaly plaques affecting roughly 90% body surface area  - Yellowed, thickened nails with several transverse lines  - No other lesions of concern on areas examined.                                   Past Medical History:   No past medical history on file.  No past surgical history on file.    Social History:   reports that he has quit smoking. He has quit using smokeless tobacco.    Family History:  No family history on file.    Medications:  Current Outpatient Medications   Medication Sig Dispense Refill    amLODIPine (NORVASC) 5 MG tablet Take 1 tablet (5 mg) by mouth daily      aspirin 81 MG EC tablet Take 81 mg by mouth daily      atorvastatin (LIPITOR) 80 MG tablet Take 80 mg by mouth daily      carvedilol (COREG) 12.5 MG tablet Take 12.5 mg by mouth 2 times daily      clopidogrel (PLAVIX) 75 MG tablet Take 75 mg by mouth daily       desonide (DESOWEN) 0.05 % external ointment APPLY TOPICALLY TO THE AFFECTED AREA ON FACE AND SCALP TWICE DAILY AS NEEDED 60 g 3    ferrous sulfate (FEROSUL) 325 (65 Fe) MG tablet Take 325 mg by mouth daily (with breakfast)      fexofenadine (ALLEGRA) 180 MG tablet Take 1 tablet (180 mg) by mouth 2 times daily 180 tablet 3    folic acid (FOLVITE) 1 MG tablet Take 1 mg by mouth daily      furosemide (LASIX) 20 MG tablet Take 20 mg by mouth daily      furosemide (LASIX) 20 MG tablet Take 20 mg by mouth as needed (take extra 20mg as needed for weight gain)      hydrOXYzine HCl (ATARAX) 25 MG tablet Take 25 mg by mouth at bedtime      tacrolimus (PROTOPIC) 0.1 % external ointment Apply twice daily to areas of eczema 100 g 11    tamsulosin (FLOMAX) 0.4 MG capsule Take 0.4 mg by mouth daily (with dinner)      Tralokinumab-ldrm 150 MG/ML SOSY Inject 150 mg/mL Subcutaneous every 14 days      urea (GORMEL) 20 % external cream Apply twice daily to areas of flaking/scaling. 480g is one month supply 480 g 11     No Known Allergies

## 2024-06-07 NOTE — PATIENT INSTRUCTIONS
Wound Care After a Biopsy    What is a skin biopsy?  A skin biopsy allows the doctor to examine a very small piece of tissue under the microscope to determine the diagnosis and the best treatment for the skin condition. A local anesthetic (numbing medicine) is injected with a very small needle into the skin area to be tested. A small piece of skin is taken from the area. Sometimes a suture (stitch) is used.     What are the risks of a skin biopsy?  I will experience scar, bleeding, swelling, pain, crusting and redness. I may experience incomplete removal or recurrence. Risks of this procedure are excessive bleeding, bruising, infection, nerve damage, numbness, thick (hypertrophic or keloidal) scar and non-diagnostic biopsy.    How should I care for my wound for the first 24 hours?  Keep the wound dry and covered for 24 hours  If it bleeds, hold direct pressure on the area for 15 minutes. If bleeding does not stop, call us or go to the emergency room  Avoid strenuous exercise the first 1-2 days or as your doctor instructs you    How should I care for the wound after 24 hours?  After 24 hours, remove the bandage  You may bathe or shower as normal  If you had a scalp biopsy, you can shampoo as usual and can use shower water to clean the biopsy site daily  Clean the wound once a day with gentle soap and water  Do not scrub, be gentle  Apply white petroleum/Vaseline after cleaning the wound with a cotton swab or a clean finger, and keep the site covered with a Bandaid /bandage. Bandages are not necessary with a scalp biopsy  If you are unable to cover the site with a Bandaid /bandage, re-apply ointment 2-3 times a day to keep the site moist. Moisture will help with healing  Avoid strenuous activity for first 1-2 days  Avoid lakes, rivers, pools, and oceans until the stitches are removed or the site is healed    How do I clean my wound?  Wash hands thoroughly with soap or use hand  before all wound care  Clean  the wound with gentle soap and water  Apply white petroleum/Vaseline  to wound after it is clean  Replace the Bandaid /bandage to keep the wound covered for the first few days or as instructed by your doctor  If you had a scalp biopsy, warm shower water to the area on a daily basis should suffice    What should I use to clean my wound?   Cotton-tipped applicators (Qtips )  White petroleum jelly (Vaseline ). Use a clean new container and use Q-tips to apply.  Bandaids  as needed  Gentle soap     How should I care for my wound long term?  Do not get your wound dirty  Keep up with wound care for one week or until the area is healed.  If you have stitches, stitches need to be removed in 14 days. You may return to our clinic for this or you may have it done locally at your doctor s office.  A small scab will form and fall off by itself when the area is completely healed. The area will be red and will become pink in color as it heals. Sun protection is very important for how your scar will turn out. Sunscreen with an SPF 30 or greater is recommended once the area is healed.  You should have some soreness but it should be mild and slowly go away over several days. Talk to your doctor about using tylenol for pain,    When should I call my doctor?  If you have increased:   Pain or swelling  Pus or drainage (clear or slightly yellow drainage is ok)  Temperature over 100F  Spreading redness or warmth around wound    When will I hear about my results?  The biopsy results can take 2 weeks to come back.  Your results will automatically release to Vett[employer] before your provider has even reviewed them.  The clinic will call you with the results, send you a Vettro message, or have you schedule a follow-up clinic or phone time to discuss the results.  Contact our clinics if you do not hear from us in 2 weeks.    Who should I call with questions?  [facility]: [phone]  [facility]  [facility]: [phone]  For urgent needs outside of business hours call the [facility] at [phone] and ask for the dermatology resident on call

## 2024-07-09 NOTE — PROGRESS NOTES
[facility] Dermatology Note    Encounter Date: Jul 12, 2024    Dermatology Problem List:  #.  Erythroderma, Ezcematous Dermatitis   - 6/7/24: Flow cytometry no abberrant immunophenotype T Cell, TCR negative, , CMP WNL, peripheral smear no sezary type cells, no abberant T cells, lymphs show unremarkable morphology   - 6/7/24 Bx: R Paraspinal back and R Lateral upper arm: moderately dense lymphocytic infiltrate equal amount CD4/CD8 favor eczematous dermatitis, CD3+, CD4/CD8 equal ratio, PAS -  - outside biopsy x2: subacute spongiotic dermatitis, repeat biopsies September 2023 with spongiotic dermatitis  - Prior workup: Pemphigus and pemphigoid antibody panel 2/16/2024 negative, Patch testing February 2023 positive fragrance and Isoeugenol)  - current therapy: methotrexate 15mg weekly started 7/12/24, Prednisone taper started 7/12/24 urea 20% cream BID, augmented betamethasone ointment/lotion  - previous therapy: mycophenolate 1000 mg BID, cetirizine 10/20, dupilumab, tralokinumab,  tacrolimus ointment BID, desoximetasone/desonide/fluocinonide, nbUVB phototherapy  - in reserve:  NIKHIL inhibitor  #. Actinic purpura: due in part to topical steroid use  # Steroid induced acne   - Current: Clindamycin 1% lotion BID PRN   # ISK   - LN2    ______________________________________    Impression/Plan:  # Eczematous Dermatiis with a hx Erythroderma, was around 90% BSA. However since last being seen a month ago, has greatly improved while on the 60mg of Prednisone daily. Given the negative labs and biopsies to suggest CTCL, will plan on treating for Atopic Dermatitis accordingly. However given the delayed age of presentation and no response to Dupixent and Tralokinumab while low can always consider an atypical presentation of CTCL, so will hold off on Nikhil inhibitor for now and will plan on starting Methotrexate 15mg weekly with Folic Acid 1mg on days not taking methotrexate. Patient and wife feel more  comfortable with methotrexate over the jak inhibitor. Counseled about the lab monitoring a/w with methotrexate with lab draw 1 week after starting, 1 month and then followed up every three months. Counseled about associated lab abnormalities such as liver enzymes, WBC counts and Cr abnormalities, will plan for serial monitoring. Will also plan on steroid taper today and will plan for follow up coinciding with Prednisone taper.    - continue topicals: betamethasone diprionate 0.05% ointment to body  - Recommend starting Prednisone taper    - 50mg x 7 days, 40 mg x 7 days, 30 mg x 7 days, 20 mg until in person follow up   - Recommend staring methotrexate 15 mg weekly, with Folic Acid 1mg on days not taking methotrexate   - Counseled about side effects which include lab derangements, GI upset, mouth sores   - Lab monitoring ordered: CBC, CMP for 1 week,1 month and 3 months after starting methotrexate     # Steroid Induced Acneiform lesions   - Recommend starting Clindamycin 1% Lotion BID until resolution     # ISK right superior eye lid  x 2  - LN2 preformed procedure note below   CRYOTHERAPY PROCEDURE NOTE: Today I treated a total of 2 lesions with a 5 second freeze-thaw cycle with liquid nitrogen via a flores .  Discussed risk of blistering and post inflammatory pigment change. Wound instruction provided.        Follow-up in 2-3 months    Staff Involved:  Staff/Scribe    Scribe Disclosure:   I, Lulu Estevez, am serving as a scribe to document services personally performed by Gilberto Virgen MD based on data collection and the provider's statements to me.     Lupe Chauhan MD  Dermatology Resident, PGY-4  [facility]  Pager: [phone]    Staff Physician Comments:   I saw and evaluated the patient with the resident and I edited the assessment and plan as documented in the note. I was present for the entire minor procedure and examination.    Provider Disclosure:   The documentation recorded  by the scribe accurately reflects the services I personally performed and the decisions made by me.    Gilberto Virgen MD   of Dermatology  Department of Dermatology  [facility] [employer]            CC:   Chief Complaint   Patient presents with    Derm Problem     Sean states there has been improvement with prednisone        History of Present Illness:  Mr. Junaid Cormier is a 74 year old male who presents as a return patient for erythroderma.     Last visit   Prednisone 60 mg daily, urea 20% cream BID, augmented betamethasone ointment/lotion, gabapentin 100 mg nightly for sleep  - previous therapy: mycophenolate 1000 mg BID, cetirizine 10/20, dupilumab, tralokinumab,  tacrolimus ointment BID, desoximetasone/desonide/fluocinonide, nbUVB phototherapy    Labs:  - 6/7/24: Flow cytometry no abberrant immunophenotype T Cell, TCR negative, , CMP WNL, peripheral smear no sezary type cells, no abberant T cells, lymphs show unremarkable morphology   - 6/7/24 Bx: R Paraspinal back and R Lateral upper arm: moderately dense lymphocytic infiltrate equal amount CD4/CD8 favor eczematous dermatitis, CD3+, CD4/CD8 equal ratio, PAS -    Physical exam:  Vitals: There were no vitals taken for this visit.  GEN: This is a well developed, well-nourished male in no acute distress, in a pleasant mood.    SKIN: Foreman phototype 1  - right eyebrow with exophytic stuck on verrucous papules with pseudocysts noted under dermoscopy   - scalp with scattered inflammatory pustules, no scale or crust or atypical vessels noted under dermoscopy   - marked reduction in the erythema from previous exam   - No other lesions of concern on areas examined.       Past Medical History:   No past medical history on file.  No past surgical history on file.    Social History:   reports that he has quit smoking. He has quit using smokeless tobacco.    Family History:  No family history on  file.    Medications:  Current Outpatient Medications   Medication Sig Dispense Refill    amLODIPine (NORVASC) 5 MG tablet Take 1 tablet (5 mg) by mouth daily      aspirin 81 MG EC tablet Take 81 mg by mouth daily      atorvastatin (LIPITOR) 80 MG tablet Take 80 mg by mouth daily      carvedilol (COREG) 12.5 MG tablet Take 12.5 mg by mouth 2 times daily      clindamycin (CLEOCIN T) 1 % external lotion Apply topically 2 times daily Apply two times daily to the acne on the scalp and neck 60 mL 3    clopidogrel (PLAVIX) 75 MG tablet Take 75 mg by mouth daily      desonide (DESOWEN) 0.05 % external ointment APPLY TOPICALLY TO THE AFFECTED AREA ON FACE AND SCALP TWICE DAILY AS NEEDED 60 g 3    ferrous sulfate (FEROSUL) 325 (65 Fe) MG tablet Take 325 mg by mouth daily (with breakfast)      fexofenadine (ALLEGRA) 180 MG tablet Take 1 tablet (180 mg) by mouth 2 times daily 180 tablet 3    folic acid (FOLVITE) 1 MG tablet Take 1 tablet (1 mg) by mouth daily Take one tablet by mouth on the days that you do not take Methotrexate 90 tablet 3    folic acid (FOLVITE) 1 MG tablet Take 1 mg by mouth daily      furosemide (LASIX) 20 MG tablet Take 20 mg by mouth as needed (take extra 20mg as needed for weight gain)      gabapentin (NEURONTIN) 100 MG capsule Take 100 mg (1 capsule) every night before bed 30 capsule 1    hydrOXYzine HCl (ATARAX) 25 MG tablet Take 25 mg by mouth at bedtime      methotrexate 2.5 MG tablet Take 6 tablets (15 mg) by mouth every 7 days 30 tablet 2    predniSONE (DELTASONE) 10 MG tablet 50 mg x 7 days, 40mg x 7 days, 30mg x 7 days, 20 mg until follow up in two months 200 tablet 0    predniSONE (DELTASONE) 20 MG tablet Take 60 mg (3 tablets) daily until you hear from the dermatology clinic. 90 tablet 1    tacrolimus (PROTOPIC) 0.1 % external ointment Apply twice daily to areas of eczema 100 g 11    tamsulosin (FLOMAX) 0.4 MG capsule Take 0.4 mg by mouth daily (with dinner)      urea (GORMEL) 20 % external  cream Apply twice daily to areas of flaking/scaling. 480g is one month supply 480 g 11    furosemide (LASIX) 20 MG tablet Take 20 mg by mouth daily (Patient not taking: Reported on 7/12/2024)       No Known Allergies

## 2024-07-12 NOTE — NURSING NOTE
Dermatology Rooming Note    Junaid Cormier's goals for this visit include:   Chief Complaint   Patient presents with    Derm Problem     Sean states there has been improvement with prednisone      Devon ACHARYA CMA

## 2024-07-12 NOTE — PATIENT INSTRUCTIONS
Continue   Continue Betamethasone ointment as needed for the rash on the body   Start your Prednisone taper   50mg x 7 days   40 mg x 7 days   30mg x 7 days   20 mg until follow up   3. Start taking Methotrexate    - 15 mg (6 pills) once per week   - On days not taking Methotrexate take Folic Acid   4. For acne    - Apply topical Clindamycin 1% lotion       Plan for follow up in 2-3 months

## 2024-07-12 NOTE — LETTER
7/12/2024       RE: Junaid Cormier  [address] [place] [address]     Dear Colleague,    Thank you for referring your patient, Junaid Cormier, to the [facility] [place] at [facility]. Please see a copy of my visit note below.    [facility] Dermatology Note    Encounter Date: Jul 12, 2024    Dermatology Problem List:  #.  Erythroderma, Ezcematous Dermatitis   - 6/7/24: Flow cytometry no abberrant immunophenotype T Cell, TCR negative, , CMP WNL, peripheral smear no sezary type cells, no abberant T cells, lymphs show unremarkable morphology   - 6/7/24 Bx: R Paraspinal back and R Lateral upper arm: moderately dense lymphocytic infiltrate equal amount CD4/CD8 favor eczematous dermatitis, CD3+, CD4/CD8 equal ratio, PAS -  - outside biopsy x2: subacute spongiotic dermatitis, repeat biopsies September 2023 with spongiotic dermatitis  - Prior workup: Pemphigus and pemphigoid antibody panel 2/16/2024 negative, Patch testing February 2023 positive fragrance and Isoeugenol)  - current therapy: methotrexate 15mg weekly started 7/12/24, Prednisone taper started 7/12/24 urea 20% cream BID, augmented betamethasone ointment/lotion  - previous therapy: mycophenolate 1000 mg BID, cetirizine 10/20, dupilumab, tralokinumab,  tacrolimus ointment BID, desoximetasone/desonide/fluocinonide, nbUVB phototherapy  - in reserve:  CLARKE inhibitor  #. Actinic purpura: due in part to topical steroid use  # Steroid induced acne   - Current: Clindamycin 1% lotion BID PRN   # ISK   - LN2    ______________________________________    Impression/Plan:  # Eczematous Dermatiis with a hx Erythroderma, was around 90% BSA. However since last being seen a month ago, has greatly improved while on the 60mg of Prednisone daily. Given the negative labs and biopsies to suggest CTCL, will plan on treating for Atopic Dermatitis accordingly. However  given the delayed age of presentation and no response to Dupixent and Tralokinumab while low can always consider an atypical presentation of CTCL, so will hold off on Nikhil inhibitor for now and will plan on starting Methotrexate 15mg weekly with Folic Acid 1mg on days not taking methotrexate. Patient and wife feel more comfortable with methotrexate over the jak inhibitor. Counseled about the lab monitoring a/w with methotrexate with lab draw 1 week after starting, 1 month and then followed up every three months. Counseled about associated lab abnormalities such as liver enzymes, WBC counts and Cr abnormalities, will plan for serial monitoring. Will also plan on steroid taper today and will plan for follow up coinciding with Prednisone taper.    - continue topicals: betamethasone diprionate 0.05% ointment to body  - Recommend starting Prednisone taper    - 50mg x 7 days, 40 mg x 7 days, 30 mg x 7 days, 20 mg until in person follow up   - Recommend staring methotrexate 15 mg weekly, with Folic Acid 1mg on days not taking methotrexate   - Counseled about side effects which include lab derangements, GI upset, mouth sores   - Lab monitoring ordered: CBC, CMP for 1 week,1 month and 3 months after starting methotrexate     # Steroid Induced Acneiform lesions   - Recommend starting Clindamycin 1% Lotion BID until resolution     # ISK right superior eye lid  x 2  - LN2 preformed procedure note below   CRYOTHERAPY PROCEDURE NOTE: Today I treated a total of 2 lesions with a 5 second freeze-thaw cycle with liquid nitrogen via a flores .  Discussed risk of blistering and post inflammatory pigment change. Wound instruction provided.        Follow-up in 2-3 months    Staff Involved:  Staff/Scribe    Scribe Disclosure:   I, Lulu Estevez, am serving as a scribe to document services personally performed by Gilberto Virgen MD based on data collection and the provider's statements to me.     Lupe Chauhan MD  Dermatology  Resident, PGY-4  [facility]  Pager: [phone]    Staff Physician Comments:   I saw and evaluated the patient with the resident and I edited the assessment and plan as documented in the note. I was present for the entire minor procedure and examination.    Provider Disclosure:   The documentation recorded by the scribe accurately reflects the services I personally performed and the decisions made by me.    Gilberto Virgen MD   of Dermatology  Department of Dermatology  [facility] [employer] [facility]            CC:   Chief Complaint   Patient presents with     Derm Problem     Sean states there has been improvement with prednisone        History of Present Illness:  Mr. Junaid Cormier is a 74 year old male who presents as a return patient for erythroderma.     Last visit   Prednisone 60 mg daily, urea 20% cream BID, augmented betamethasone ointment/lotion, gabapentin 100 mg nightly for sleep  - previous therapy: mycophenolate 1000 mg BID, cetirizine 10/20, dupilumab, tralokinumab,  tacrolimus ointment BID, desoximetasone/desonide/fluocinonide, nbUVB phototherapy    Labs:  - 6/7/24: Flow cytometry no abberrant immunophenotype T Cell, TCR negative, , CMP WNL, peripheral smear no sezary type cells, no abberant T cells, lymphs show unremarkable morphology   - 6/7/24 Bx: R Paraspinal back and R Lateral upper arm: moderately dense lymphocytic infiltrate equal amount CD4/CD8 favor eczematous dermatitis, CD3+, CD4/CD8 equal ratio, PAS -    Physical exam:  Vitals: There were no vitals taken for this visit.  GEN: This is a well developed, well-nourished male in no acute distress, in a pleasant mood.    SKIN: Foreman phototype 1  - right eyebrow with exophytic stuck on verrucous papules with pseudocysts noted under dermoscopy   - scalp with scattered inflammatory pustules, no scale or crust or atypical vessels noted under dermoscopy   - marked reduction in the  erythema from previous exam   - No other lesions of concern on areas examined.       Past Medical History:   No past medical history on file.  No past surgical history on file.    Social History:   reports that he has quit smoking. He has quit using smokeless tobacco.    Family History:  No family history on file.    Medications:  Current Outpatient Medications   Medication Sig Dispense Refill     amLODIPine (NORVASC) 5 MG tablet Take 1 tablet (5 mg) by mouth daily       aspirin 81 MG EC tablet Take 81 mg by mouth daily       atorvastatin (LIPITOR) 80 MG tablet Take 80 mg by mouth daily       carvedilol (COREG) 12.5 MG tablet Take 12.5 mg by mouth 2 times daily       clindamycin (CLEOCIN T) 1 % external lotion Apply topically 2 times daily Apply two times daily to the acne on the scalp and neck 60 mL 3     clopidogrel (PLAVIX) 75 MG tablet Take 75 mg by mouth daily       desonide (DESOWEN) 0.05 % external ointment APPLY TOPICALLY TO THE AFFECTED AREA ON FACE AND SCALP TWICE DAILY AS NEEDED 60 g 3     ferrous sulfate (FEROSUL) 325 (65 Fe) MG tablet Take 325 mg by mouth daily (with breakfast)       fexofenadine (ALLEGRA) 180 MG tablet Take 1 tablet (180 mg) by mouth 2 times daily 180 tablet 3     folic acid (FOLVITE) 1 MG tablet Take 1 tablet (1 mg) by mouth daily Take one tablet by mouth on the days that you do not take Methotrexate 90 tablet 3     folic acid (FOLVITE) 1 MG tablet Take 1 mg by mouth daily       furosemide (LASIX) 20 MG tablet Take 20 mg by mouth as needed (take extra 20mg as needed for weight gain)       gabapentin (NEURONTIN) 100 MG capsule Take 100 mg (1 capsule) every night before bed 30 capsule 1     hydrOXYzine HCl (ATARAX) 25 MG tablet Take 25 mg by mouth at bedtime       methotrexate 2.5 MG tablet Take 6 tablets (15 mg) by mouth every 7 days 30 tablet 2     predniSONE (DELTASONE) 10 MG tablet 50 mg x 7 days, 40mg x 7 days, 30mg x 7 days, 20 mg until follow up in two months 200 tablet 0      predniSONE (DELTASONE) 20 MG tablet Take 60 mg (3 tablets) daily until you hear from the dermatology clinic. 90 tablet 1     tacrolimus (PROTOPIC) 0.1 % external ointment Apply twice daily to areas of eczema 100 g 11     tamsulosin (FLOMAX) 0.4 MG capsule Take 0.4 mg by mouth daily (with dinner)       urea (GORMEL) 20 % external cream Apply twice daily to areas of flaking/scaling. 480g is one month supply 480 g 11     furosemide (LASIX) 20 MG tablet Take 20 mg by mouth daily (Patient not taking: Reported on 7/12/2024)       No Known Allergies      Again, thank you for allowing me to participate in the care of your patient.      Sincerely,    Gilberto Virgen MD